# Patient Record
Sex: MALE | Race: WHITE | NOT HISPANIC OR LATINO | Employment: OTHER | URBAN - METROPOLITAN AREA
[De-identification: names, ages, dates, MRNs, and addresses within clinical notes are randomized per-mention and may not be internally consistent; named-entity substitution may affect disease eponyms.]

---

## 2017-02-27 ENCOUNTER — TRANSCRIBE ORDERS (OUTPATIENT)
Dept: LAB | Facility: OTHER | Age: 82
End: 2017-02-27

## 2017-02-27 ENCOUNTER — APPOINTMENT (OUTPATIENT)
Dept: LAB | Facility: OTHER | Age: 82
End: 2017-02-27
Payer: MEDICARE

## 2017-02-27 DIAGNOSIS — J90 UNSPECIFIED PLEURAL EFFUSION: ICD-10-CM

## 2017-02-27 DIAGNOSIS — I27.29 PULMONARY HYPERTENSIVE VENOUS DISEASE (HCC): ICD-10-CM

## 2017-02-27 DIAGNOSIS — N18.30 CHRONIC KIDNEY DISEASE, STAGE III (MODERATE) (HCC): ICD-10-CM

## 2017-02-27 DIAGNOSIS — M1A.00X0 CHRONIC GOUTY ARTHRITIS: ICD-10-CM

## 2017-02-27 DIAGNOSIS — I71.4 ABDOMINAL AORTIC ANEURYSM WITHOUT RUPTURE (HCC): ICD-10-CM

## 2017-02-27 DIAGNOSIS — I50.22 CHRONIC SYSTOLIC HEART FAILURE (HCC): Primary | ICD-10-CM

## 2017-02-27 DIAGNOSIS — I50.22 CHRONIC SYSTOLIC HEART FAILURE (HCC): ICD-10-CM

## 2017-02-27 LAB
ANION GAP SERPL CALCULATED.3IONS-SCNC: 5 MMOL/L (ref 4–13)
BASOPHILS # BLD MANUAL: 0 THOUSAND/UL (ref 0–0.1)
BASOPHILS NFR MAR MANUAL: 0 % (ref 0–1)
BUN SERPL-MCNC: 36 MG/DL (ref 5–25)
CALCIUM SERPL-MCNC: 9.3 MG/DL (ref 8.3–10.1)
CHLORIDE SERPL-SCNC: 107 MMOL/L (ref 100–108)
CO2 SERPL-SCNC: 29 MMOL/L (ref 21–32)
CREAT SERPL-MCNC: 1.87 MG/DL (ref 0.6–1.3)
EOSINOPHIL # BLD MANUAL: 0.29 THOUSAND/UL (ref 0–0.4)
EOSINOPHIL NFR BLD MANUAL: 4 % (ref 0–6)
ERYTHROCYTE [DISTWIDTH] IN BLOOD BY AUTOMATED COUNT: 14 % (ref 11.6–15.1)
GFR SERPL CREATININE-BSD FRML MDRD: 34.7 ML/MIN/1.73SQ M
GIANT PLATELETS BLD QL SMEAR: PRESENT
GLUCOSE SERPL-MCNC: 114 MG/DL (ref 65–140)
HCT VFR BLD AUTO: 44.8 % (ref 36.5–49.3)
HGB BLD-MCNC: 15 G/DL (ref 12–17)
LYMPHOCYTES # BLD AUTO: 0.94 THOUSAND/UL (ref 0.6–4.47)
LYMPHOCYTES # BLD AUTO: 13 % (ref 14–44)
MCH RBC QN AUTO: 32.4 PG (ref 26.8–34.3)
MCHC RBC AUTO-ENTMCNC: 33.5 G/DL (ref 31.4–37.4)
MCV RBC AUTO: 97 FL (ref 82–98)
MONOCYTES # BLD AUTO: 0.36 THOUSAND/UL (ref 0–1.22)
MONOCYTES NFR BLD: 5 % (ref 4–12)
NEUTROPHILS # BLD MANUAL: 5.58 THOUSAND/UL (ref 1.85–7.62)
NEUTS SEG NFR BLD AUTO: 77 % (ref 43–75)
NRBC BLD AUTO-RTO: 0 /100 WBCS
NT-PROBNP SERPL-MCNC: ABNORMAL PG/ML
PLATELET # BLD AUTO: 132 THOUSANDS/UL (ref 149–390)
PLATELET BLD QL SMEAR: ADEQUATE
PMV BLD AUTO: 13.1 FL (ref 8.9–12.7)
POTASSIUM SERPL-SCNC: 4.5 MMOL/L (ref 3.5–5.3)
RBC # BLD AUTO: 4.63 MILLION/UL (ref 3.88–5.62)
RBC MORPH BLD: NORMAL
SODIUM SERPL-SCNC: 141 MMOL/L (ref 136–145)
URATE SERPL-MCNC: 10 MG/DL (ref 4.2–8)
VARIANT LYMPHS # BLD AUTO: 1 %
WBC # BLD AUTO: 7.25 THOUSAND/UL (ref 4.31–10.16)

## 2017-02-27 PROCEDURE — 85007 BL SMEAR W/DIFF WBC COUNT: CPT

## 2017-02-27 PROCEDURE — 84550 ASSAY OF BLOOD/URIC ACID: CPT

## 2017-02-27 PROCEDURE — 85027 COMPLETE CBC AUTOMATED: CPT

## 2017-02-27 PROCEDURE — 36415 COLL VENOUS BLD VENIPUNCTURE: CPT

## 2017-02-27 PROCEDURE — 80048 BASIC METABOLIC PNL TOTAL CA: CPT

## 2017-02-27 PROCEDURE — 83880 ASSAY OF NATRIURETIC PEPTIDE: CPT

## 2018-01-11 NOTE — MISCELLANEOUS
Message  Discussed with patient MRI scan results, regarding the subacute to early chronic stroke, patient is feeling much better at the current time, denies having any headache or any weakness, his carotid ultrasound was normal, I spoke to his cardiologist Dr Jaclyn Kong, he was going to do a cardiac workup including cardiac monitoring and decide if patient needs to be on anticoagulation patient was advised to increase his baby aspirin to regular aspirin and to go to the hospital if has strokelike symptoms, office staff   O2 connect me to his family physician to give him an update patient was advised to keep blood pressure cholesterol and sugar under control and to take fall and safety precautions, he is not keen to be admitted into the hospital,,,      Signatures   Electronically signed by : Ariel Gonsalez MD; Sep  7 2016 12:37PM EST                       (Author)

## 2018-04-18 LAB
ANION GAP SERPL CALCULATED.3IONS-SCNC: 10.9 MM/L
APTT PPP: 29 SEC (ref 24.4–37.6)
BASOPHILS # BLD AUTO: 0.1 X3/UL (ref 0–0.3)
BASOPHILS # BLD AUTO: 1.4 % (ref 0–2)
BNP SERPL-MCNC: 1273 PG/ML (ref 1–100)
BUN SERPL-MCNC: 39 MG/DL (ref 7–25)
CALCIUM SERPL-MCNC: 9.6 MG/DL (ref 8.6–10.5)
CHLORIDE SERPL-SCNC: 100 MM/L (ref 98–107)
CO2 SERPL-SCNC: 36 MM/L (ref 21–31)
CREAT SERPL-MCNC: 1.69 MG/DL (ref 0.7–1.3)
DEPRECATED RDW RBC AUTO: 15.7 %
EGFR (HISTORICAL): 39 GFR
EGFR AFRICAN AMERICAN (HISTORICAL): 47 GFR
EOSINOPHIL # BLD AUTO: 0.2 X3/UL (ref 0–0.5)
EOSINOPHIL NFR BLD AUTO: 2.9 % (ref 0–5)
GLUCOSE (HISTORICAL): 107 MG/DL (ref 65–99)
HCT VFR BLD AUTO: 44.1 % (ref 42–52)
HGB BLD-MCNC: 14.8 G/DL (ref 14–18)
INFLUENZA A (VIRAL ID) (HISTORICAL): NEGATIVE
INFLUENZA B (VIRAL ID) (HISTORICAL): NEGATIVE
INR PPP: 1.23 (ref 0.9–1.5)
LYMPHOCYTES # BLD AUTO: 0.6 X3/UL (ref 1.2–4.2)
LYMPHOCYTES NFR BLD AUTO: 9.8 % (ref 20.5–51.1)
MAGNESIUM SERPL-MCNC: 2.2 MG/DL (ref 1.9–2.7)
MCH RBC QN AUTO: 33.4 PG (ref 26–34)
MCHC RBC AUTO-ENTMCNC: 33.6 G/DL (ref 31–37)
MCV RBC AUTO: 99.6 FL (ref 81–99)
MONOCYTES # BLD AUTO: 0.5 X3/UL (ref 0–1)
MONOCYTES NFR BLD AUTO: 8.1 % (ref 1.7–12)
NEUTROPHILS # BLD AUTO: 5 X3/UL (ref 1.4–6.5)
NEUTS SEG NFR BLD AUTO: 77.8 % (ref 42.2–75.2)
OSMOLALITY, SERUM (HISTORICAL): 295 MOSM (ref 262–291)
OVALOCYTOSIS (HISTORICAL): SLIGHT
PHOSPHATE SERPL-MCNC: 3.3 MG/DL (ref 3–5.5)
PLATELET # BLD AUTO: 131 X3/UL (ref 130–400)
PLATELET ESTIMATE (HISTORICAL): ABNORMAL
PMV BLD AUTO: 10.2 FL
POTASSIUM SERPL-SCNC: 3.9 MM/L (ref 3.5–5.5)
PROTHROMBIN TIME (HISTORICAL): 14.2 SEC (ref 10.1–12.9)
RBC # BLD AUTO: 4.43 X6/UL (ref 4.3–5.9)
SODIUM SERPL-SCNC: 143 MM/L (ref 134–143)
WBC # BLD AUTO: 6.4 X3/UL (ref 4.8–10.8)

## 2018-04-19 LAB
ALBUMIN SERPL BCP-MCNC: 3.7 G/DL (ref 3.5–5.7)
ALP SERPL-CCNC: 47 IU/L (ref 55–165)
ALT SERPL W P-5'-P-CCNC: 9 IU/L (ref 7–26)
ANION GAP SERPL CALCULATED.3IONS-SCNC: 12.9 MM/L
AST SERPL W P-5'-P-CCNC: 14 U/L (ref 8–27)
BASOPHILS # BLD AUTO: 0 X3/UL (ref 0–0.3)
BASOPHILS # BLD AUTO: 0.3 % (ref 0–2)
BILIRUB SERPL-MCNC: 1.1 MG/DL (ref 0.3–1)
BUN SERPL-MCNC: 38 MG/DL (ref 7–25)
CALCIUM SERPL-MCNC: 9.2 MG/DL (ref 8.6–10.5)
CHLORIDE SERPL-SCNC: 99 MM/L (ref 98–107)
CO2 SERPL-SCNC: 35 MM/L (ref 21–31)
CREAT SERPL-MCNC: 1.81 MG/DL (ref 0.7–1.3)
DEPRECATED RDW RBC AUTO: 15.8 %
EGFR (HISTORICAL): 36 GFR
EGFR AFRICAN AMERICAN (HISTORICAL): 44 GFR
EOSINOPHIL # BLD AUTO: 0 X3/UL (ref 0–0.5)
EOSINOPHIL NFR BLD AUTO: 0 % (ref 0–5)
GLUCOSE (HISTORICAL): 132 MG/DL (ref 65–99)
HCT VFR BLD AUTO: 39.3 % (ref 42–52)
HGB BLD-MCNC: 13.5 G/DL (ref 14–18)
INR PPP: 1.2 (ref 0.9–1.5)
LYMPHOCYTES # BLD AUTO: 0.2 X3/UL (ref 1.2–4.2)
LYMPHOCYTES NFR BLD AUTO: 5 % (ref 20.5–51.1)
LYMPHOCYTES NFR BLD AUTO: 7.3 % (ref 20.5–51.1)
MACROCYTOSIS (HISTORICAL): ABNORMAL
MCH RBC QN AUTO: 34.1 PG (ref 26–34)
MCHC RBC AUTO-ENTMCNC: 34.4 G/DL (ref 31–37)
MCV RBC AUTO: 99 FL (ref 81–99)
MONOCYTES # BLD AUTO: 0.1 X3/UL (ref 0–1)
MONOCYTES (HISTORICAL): 3 % (ref 1.7–12)
MONOCYTES NFR BLD AUTO: 2.1 % (ref 1.7–12)
NEUTROPHILS # BLD AUTO: 2.8 X3/UL (ref 1.4–6.5)
NEUTROPHILS ABS COUNT (HISTORICAL): 92 % (ref 42.2–75.2)
NEUTS SEG NFR BLD AUTO: 90.3 % (ref 42.2–75.2)
OSMOLALITY, SERUM (HISTORICAL): 296 MOSM (ref 262–291)
PLATELET # BLD AUTO: 134 X3/UL (ref 130–400)
PLATELET ESTIMATE (HISTORICAL): ABNORMAL
PMV BLD AUTO: 11 FL
POTASSIUM SERPL-SCNC: 3.9 MM/L (ref 3.5–5.5)
PROTHROMBIN TIME (HISTORICAL): 13.9 SEC (ref 10.1–12.9)
RBC # BLD AUTO: 3.97 X6/UL (ref 4.3–5.9)
SODIUM SERPL-SCNC: 143 MM/L (ref 134–143)
TOTAL PROTEIN (HISTORICAL): 6.7 G/DL (ref 6.4–8.9)
TROPONIN I SERPL-MCNC: < 0.03 NG/ML
WBC # BLD AUTO: 3.1 X3/UL (ref 4.8–10.8)

## 2018-04-20 LAB
ALBUMIN SERPL BCP-MCNC: 3.5 G/DL (ref 3.5–5.7)
ALP SERPL-CCNC: 47 IU/L (ref 55–165)
ALT SERPL W P-5'-P-CCNC: 8 IU/L (ref 7–26)
ANION GAP SERPL CALCULATED.3IONS-SCNC: 11.8 MM/L
AST SERPL W P-5'-P-CCNC: 14 U/L (ref 8–27)
BASOPHILS # BLD AUTO: 0 X3/UL (ref 0–0.3)
BASOPHILS # BLD AUTO: 0.3 % (ref 0–2)
BILIRUB SERPL-MCNC: 1.1 MG/DL (ref 0.3–1)
BUN SERPL-MCNC: 47 MG/DL (ref 7–25)
CALCIUM SERPL-MCNC: 9.2 MG/DL (ref 8.6–10.5)
CHLORIDE SERPL-SCNC: 95 MM/L (ref 98–107)
CO2 SERPL-SCNC: 36 MM/L (ref 21–31)
CREAT SERPL-MCNC: 2.11 MG/DL (ref 0.7–1.3)
DEPRECATED RDW RBC AUTO: 15.5 %
EGFR (HISTORICAL): 30 GFR
EGFR AFRICAN AMERICAN (HISTORICAL): 36 GFR
EOSINOPHIL # BLD AUTO: 0.1 X3/UL (ref 0–0.5)
EOSINOPHIL NFR BLD AUTO: 0.8 % (ref 0–5)
GLUCOSE (HISTORICAL): 98 MG/DL (ref 65–99)
HCT VFR BLD AUTO: 42.1 % (ref 42–52)
HGB BLD-MCNC: 14.4 G/DL (ref 14–18)
LYMPHOCYTES # BLD AUTO: 0.5 X3/UL (ref 1.2–4.2)
LYMPHOCYTES NFR BLD AUTO: 4.2 % (ref 20.5–51.1)
MAGNESIUM SERPL-MCNC: 2.1 MG/DL (ref 1.9–2.7)
MCH RBC QN AUTO: 33.9 PG (ref 26–34)
MCHC RBC AUTO-ENTMCNC: 34.2 G/DL (ref 31–37)
MCV RBC AUTO: 99.2 FL (ref 81–99)
MONOCYTES # BLD AUTO: 0.6 X3/UL (ref 0–1)
MONOCYTES NFR BLD AUTO: 5.8 % (ref 1.7–12)
NEUTROPHILS # BLD AUTO: 9.6 X3/UL (ref 1.4–6.5)
NEUTS SEG NFR BLD AUTO: 88.9 % (ref 42.2–75.2)
OSMOLALITY, SERUM (HISTORICAL): 290 MOSM (ref 262–291)
PHOSPHATE SERPL-MCNC: 3.9 MG/DL (ref 3–5.5)
PLATELET # BLD AUTO: 123 X3/UL (ref 130–400)
PMV BLD AUTO: 10.9 FL
POTASSIUM SERPL-SCNC: 3.8 MM/L (ref 3.5–5.5)
RBC # BLD AUTO: 4.24 X6/UL (ref 4.3–5.9)
SODIUM SERPL-SCNC: 139 MM/L (ref 134–143)
TOTAL PROTEIN (HISTORICAL): 6.6 G/DL (ref 6.4–8.9)
WBC # BLD AUTO: 10.8 X3/UL (ref 4.8–10.8)

## 2018-04-21 LAB
ACCESSION NUMBER (HISTORICAL): 108.01
COLLECTION DATE (HISTORICAL): NORMAL
CULTURE STATUS (HISTORICAL): NORMAL
MRSA SCREEN (HISTORICAL): POSITIVE
SPECIMEN SOURCE (HISTORICAL): NORMAL
SPECIMEN TYPE RECEIVED: (HISTORICAL): NORMAL
TEST INFORMATION (HISTORICAL): NORMAL

## 2018-06-07 LAB
ALBUMIN SERPL BCP-MCNC: 4 G/DL (ref 3.5–5.7)
ALP SERPL-CCNC: 63 IU/L (ref 55–165)
ALT SERPL W P-5'-P-CCNC: 8 IU/L (ref 7–26)
ANION GAP SERPL CALCULATED.3IONS-SCNC: 17.7 MM/L
AST SERPL W P-5'-P-CCNC: 15 U/L (ref 8–27)
BASOPHILS # BLD AUTO: 0.1 X3/UL (ref 0–0.3)
BASOPHILS # BLD AUTO: 1.5 % (ref 0–2)
BILIRUB SERPL-MCNC: 1.4 MG/DL (ref 0.3–1)
BNP SERPL-MCNC: 2036 PG/ML (ref 1–100)
BUN SERPL-MCNC: 38 MG/DL (ref 7–25)
CALCIUM SERPL-MCNC: 9.5 MG/DL (ref 8.6–10.5)
CHLORIDE SERPL-SCNC: 96 MM/L (ref 98–107)
CO2 SERPL-SCNC: 32 MM/L (ref 21–31)
CREAT SERPL-MCNC: 2.1 MG/DL (ref 0.7–1.3)
DEPRECATED RDW RBC AUTO: 14.9 % (ref 11.5–14.5)
EGFR (HISTORICAL): 30 GFR
EGFR AFRICAN AMERICAN (HISTORICAL): 37 GFR
EOSINOPHIL # BLD AUTO: 0.2 X3/UL (ref 0–0.5)
EOSINOPHIL NFR BLD AUTO: 2.6 % (ref 0–5)
GLUCOSE (HISTORICAL): 91 MG/DL (ref 65–99)
HCT VFR BLD AUTO: 41.6 % (ref 42–52)
HGB BLD-MCNC: 14.1 G/DL (ref 14–18)
LYMPHOCYTES # BLD AUTO: 0.8 X3/UL (ref 1.2–4.2)
LYMPHOCYTES NFR BLD AUTO: 11.3 % (ref 20.5–51.1)
MCH RBC QN AUTO: 33.6 PG (ref 26–34)
MCHC RBC AUTO-ENTMCNC: 34 G/DL (ref 31–36)
MCV RBC AUTO: 98.9 FL (ref 81–99)
MONOCYTES # BLD AUTO: 0.6 X3/UL (ref 0–1)
MONOCYTES NFR BLD AUTO: 8.2 % (ref 1.7–12)
NEUTROPHILS # BLD AUTO: 5.5 X3/UL (ref 1.4–6.5)
NEUTS SEG NFR BLD AUTO: 76.4 % (ref 42.2–75.2)
OSMOLALITY, SERUM (HISTORICAL): 292 MOSM (ref 262–291)
PLATELET # BLD AUTO: 154 X3/UL (ref 130–400)
PMV BLD AUTO: 11.2 FL (ref 8.6–11.7)
POTASSIUM SERPL-SCNC: 3.7 MM/L (ref 3.5–5.5)
RBC # BLD AUTO: 4.2 X6/UL (ref 4.3–5.9)
SODIUM SERPL-SCNC: 142 MM/L (ref 134–143)
TOTAL PROTEIN (HISTORICAL): 7.7 G/DL (ref 6.4–8.9)
WBC # BLD AUTO: 7.3 X3/UL (ref 4.8–10.8)

## 2018-06-22 ENCOUNTER — HOSPITAL ENCOUNTER (OUTPATIENT)
Dept: RADIOLOGY | Facility: HOSPITAL | Age: 83
Discharge: HOME | End: 2018-06-22
Payer: MEDICARE

## 2018-06-22 DIAGNOSIS — R53.1 WEAKNESS: ICD-10-CM

## 2018-06-22 LAB
ALBUMIN SERPL BCP-MCNC: 3.7 G/DL (ref 3.5–5.7)
ALP SERPL-CCNC: 52 IU/L (ref 55–165)
ALT SERPL W P-5'-P-CCNC: 11 IU/L (ref 7–26)
ANION GAP SERPL CALCULATED.3IONS-SCNC: 17.3 MM/L
APTT PPP: 28.8 SEC (ref 24.4–37.6)
AST SERPL W P-5'-P-CCNC: 24 U/L (ref 8–27)
BASOPHILS # BLD AUTO: 0.1 X3/UL (ref 0–0.3)
BASOPHILS # BLD AUTO: 1.2 % (ref 0–2)
BILIRUB SERPL-MCNC: 2 MG/DL (ref 0.3–1)
BNP SERPL-MCNC: 2783 PG/ML (ref 1–100)
BUN SERPL-MCNC: 53 MG/DL (ref 7–25)
CALCIUM SERPL-MCNC: 9.8 MG/DL (ref 8.6–10.5)
CHLORIDE SERPL-SCNC: 102 MM/L (ref 98–107)
CK SERPL-CCNC: 79 IU/L (ref 30–223)
CO2 SERPL-SCNC: 26 MM/L (ref 21–31)
CREAT SERPL-MCNC: 2.34 MG/DL (ref 0.7–1.3)
DEPRECATED RDW RBC AUTO: 16 %
EGFR (HISTORICAL): 27 GFR
EGFR AFRICAN AMERICAN (HISTORICAL): 32 GFR
EOSINOPHIL # BLD AUTO: 0.1 X3/UL (ref 0–0.5)
EOSINOPHIL NFR BLD AUTO: 1.4 % (ref 0–5)
GLUCOSE (HISTORICAL): 100 MG/DL (ref 65–99)
HCT VFR BLD AUTO: 46.2 % (ref 42–52)
HGB BLD-MCNC: 15.4 G/DL (ref 14–18)
INR PPP: 1.31 (ref 0.9–1.5)
LYMPHOCYTES # BLD AUTO: 0.8 X3/UL (ref 1.2–4.2)
LYMPHOCYTES NFR BLD AUTO: 9.7 % (ref 20.5–51.1)
MACROCYTOSIS (HISTORICAL): SLIGHT
MCH RBC QN AUTO: 33.8 PG (ref 26–34)
MCHC RBC AUTO-ENTMCNC: 33.4 G/DL (ref 31–37)
MCV RBC AUTO: 101.3 FL (ref 81–99)
MONOCYTES # BLD AUTO: 0.7 X3/UL (ref 0–1)
MONOCYTES NFR BLD AUTO: 8.9 % (ref 1.7–12)
NEUTROPHILS # BLD AUTO: 6.4 X3/UL (ref 1.4–6.5)
NEUTS SEG NFR BLD AUTO: 78.8 % (ref 42.2–75.2)
OSMOLALITY, SERUM (HISTORICAL): 296 MOSM (ref 262–291)
OVALOCYTOSIS (HISTORICAL): SLIGHT
PLATELET # BLD AUTO: 124 X3/UL (ref 130–400)
PLATELET ESTIMATE (HISTORICAL): ABNORMAL
PMV BLD AUTO: 11.6 FL
POTASSIUM SERPL-SCNC: 4.3 MM/L (ref 3.5–5.5)
PROTHROMBIN TIME (HISTORICAL): 15.2 SEC (ref 10.1–12.9)
RBC # BLD AUTO: 4.56 X6/UL (ref 4.3–5.9)
SODIUM SERPL-SCNC: 141 MM/L (ref 134–143)
TOTAL PROTEIN (HISTORICAL): 7.2 G/DL (ref 6.4–8.9)
TROPONIN I SERPL-MCNC: 0.03 NG/ML
WBC # BLD AUTO: 8.1 X3/UL (ref 4.8–10.8)

## 2018-06-23 ENCOUNTER — HOSPITAL ENCOUNTER (INPATIENT)
Facility: HOSPITAL | Age: 83
LOS: 2 days | DRG: 291 | End: 2018-06-25
Attending: INTERNAL MEDICINE | Admitting: PHYSICAL MEDICINE & REHABILITATION
Payer: MEDICARE

## 2018-06-23 DIAGNOSIS — R53.81 DEBILITY: ICD-10-CM

## 2018-06-23 DIAGNOSIS — N18.9 ACUTE KIDNEY INJURY SUPERIMPOSED ON CHRONIC KIDNEY DISEASE (HCC): ICD-10-CM

## 2018-06-23 DIAGNOSIS — I50.9 CONGESTIVE HEART FAILURE (HCC): ICD-10-CM

## 2018-06-23 DIAGNOSIS — R63.0 ANOREXIA: Primary | ICD-10-CM

## 2018-06-23 DIAGNOSIS — N17.9 ACUTE KIDNEY INJURY SUPERIMPOSED ON CHRONIC KIDNEY DISEASE (HCC): ICD-10-CM

## 2018-06-23 PROBLEM — I48.91 ATRIAL FIBRILLATION (HCC): Status: ACTIVE | Noted: 2018-06-23

## 2018-06-23 PROBLEM — N28.9 ACUTE ON CHRONIC RENAL INSUFFICIENCY: Chronic | Status: ACTIVE | Noted: 2018-06-23

## 2018-06-23 PROBLEM — I25.10 CAD (CORONARY ARTERY DISEASE): Status: ACTIVE | Noted: 2018-06-23

## 2018-06-23 PROBLEM — I27.20 PULMONARY HTN (HCC): Status: ACTIVE | Noted: 2018-06-23

## 2018-06-23 PROBLEM — I50.23 ACUTE ON CHRONIC SYSTOLIC CHF (CONGESTIVE HEART FAILURE) (HCC): Status: ACTIVE | Noted: 2018-06-23

## 2018-06-23 PROBLEM — N18.30 HYPERTENSIVE HEART AND KIDNEY DISEASE WITH HF AND WITH CKD STAGE III (HCC): Status: ACTIVE | Noted: 2018-06-23

## 2018-06-23 PROBLEM — N18.30 CKD (CHRONIC KIDNEY DISEASE), STAGE III (HCC): Status: ACTIVE | Noted: 2018-06-23

## 2018-06-23 PROBLEM — I71.4 AAA (ABDOMINAL AORTIC ANEURYSM) (HCC): Status: ACTIVE | Noted: 2018-06-23

## 2018-06-23 PROBLEM — I13.0 HYPERTENSIVE HEART AND KIDNEY DISEASE WITH HF AND WITH CKD STAGE III (HCC): Status: ACTIVE | Noted: 2018-06-23

## 2018-06-23 PROBLEM — G45.9 TIA (TRANSIENT ISCHEMIC ATTACK): Status: ACTIVE | Noted: 2018-06-23

## 2018-06-23 PROBLEM — E46 MALNUTRITION (HCC): Status: ACTIVE | Noted: 2018-06-23

## 2018-06-23 PROBLEM — J44.9 COPD (CHRONIC OBSTRUCTIVE PULMONARY DISEASE) (HCC): Status: ACTIVE | Noted: 2018-06-23

## 2018-06-23 PROBLEM — Z95.810 ICD (IMPLANTABLE CARDIOVERTER-DEFIBRILLATOR) IN PLACE: Chronic | Status: ACTIVE | Noted: 2018-06-23

## 2018-06-23 PROBLEM — I12.9 HYPERTENSIVE NEPHROSCLEROSIS: Status: ACTIVE | Noted: 2018-06-23

## 2018-06-23 PROBLEM — I42.0 DILATED CARDIOMYOPATHY (HCC): Chronic | Status: ACTIVE | Noted: 2018-06-23

## 2018-06-23 LAB
BILIRUB UR QL STRIP: ABNORMAL
CALCIUM OXALATE (HISTORICAL): ABNORMAL
CLARITY UR: CLEAR
COLOR UR: YELLOW
GLUCOSE UR STRIP-MCNC: NEGATIVE MG/DL
HGB UR QL STRIP.AUTO: ABNORMAL
KETONES UR STRIP-MCNC: NEGATIVE MG/DL
LEUKOCYTE ESTERASE UR QL STRIP: NEGATIVE
NITRITE UR QL STRIP: NEGATIVE
PH UR STRIP.AUTO: 5.5 [PH] (ref 4.5–8)
PROT UR STRIP-MCNC: ABNORMAL MG/DL
RBC #/AREA URNS AUTO: ABNORMAL /HPF
SP GR UR STRIP.AUTO: 1.02 (ref 1–1.03)
SPERM CELLS (HISTORICAL): POSITIVE
UROBILINOGEN UR QL STRIP.AUTO: 0.2 EU/DL (ref 0.2–8)
WBC #/AREA URNS AUTO: ABNORMAL /HPF

## 2018-06-23 PROCEDURE — 99221 1ST HOSP IP/OBS SF/LOW 40: CPT | Performed by: INTERNAL MEDICINE

## 2018-06-23 PROCEDURE — 93005 ELECTROCARDIOGRAM TRACING: CPT

## 2018-06-23 RX ORDER — COLCHICINE 0.6 MG/1
0.6 TABLET ORAL AS NEEDED
COMMUNITY

## 2018-06-23 RX ORDER — FUROSEMIDE 10 MG/ML
40 INJECTION INTRAMUSCULAR; INTRAVENOUS
Status: DISPENSED | OUTPATIENT
Start: 2018-06-23 | End: 2018-06-24

## 2018-06-23 RX ORDER — CARVEDILOL 3.12 MG/1
3.12 TABLET ORAL 2 TIMES DAILY WITH MEALS
Status: DISCONTINUED | OUTPATIENT
Start: 2018-06-23 | End: 2018-06-25 | Stop reason: HOSPADM

## 2018-06-23 RX ORDER — MECLIZINE HCL 12.5 MG/1
12.5 TABLET ORAL 3 TIMES DAILY PRN
Status: DISCONTINUED | OUTPATIENT
Start: 2018-06-23 | End: 2018-06-25 | Stop reason: HOSPADM

## 2018-06-23 RX ORDER — ACETAMINOPHEN 325 MG/1
650 TABLET ORAL 4 TIMES DAILY PRN
Status: DISCONTINUED | OUTPATIENT
Start: 2018-06-23 | End: 2018-06-25 | Stop reason: HOSPADM

## 2018-06-23 RX ORDER — ISOSORBIDE DINITRATE 20 MG/1
20 TABLET ORAL
COMMUNITY

## 2018-06-23 RX ORDER — FUROSEMIDE 40 MG/1
20 TABLET ORAL 2 TIMES DAILY
COMMUNITY
End: 2018-07-05 | Stop reason: HOSPADM

## 2018-06-23 RX ORDER — ASPIRIN 81 MG/1
81 TABLET, CHEWABLE ORAL DAILY
Status: DISCONTINUED | OUTPATIENT
Start: 2018-06-23 | End: 2018-06-25 | Stop reason: HOSPADM

## 2018-06-23 RX ORDER — ALBUTEROL SULFATE 2.5 MG/3ML
2.5 SOLUTION RESPIRATORY (INHALATION) 4 TIMES DAILY PRN
COMMUNITY
End: 2018-07-05 | Stop reason: HOSPADM

## 2018-06-23 RX ORDER — ONDANSETRON 2 MG/ML
4 INJECTION INTRAMUSCULAR; INTRAVENOUS EVERY 4 HOURS PRN
Status: DISCONTINUED | OUTPATIENT
Start: 2018-06-23 | End: 2018-06-25 | Stop reason: HOSPADM

## 2018-06-23 RX ORDER — HEPARIN SODIUM 5000 [USP'U]/ML
5000 INJECTION, SOLUTION INTRAVENOUS; SUBCUTANEOUS EVERY 12 HOURS SCHEDULED
Status: DISCONTINUED | OUTPATIENT
Start: 2018-06-23 | End: 2018-06-25 | Stop reason: HOSPADM

## 2018-06-23 RX ORDER — MECLIZINE HYDROCHLORIDE 25 MG/1
25 TABLET ORAL 3 TIMES DAILY
COMMUNITY

## 2018-06-23 RX ORDER — FUROSEMIDE 20 MG/1
20 TABLET ORAL 3 TIMES WEEKLY
COMMUNITY
End: 2018-07-05 | Stop reason: HOSPADM

## 2018-06-23 RX ORDER — ISOSORBIDE DINITRATE 10 MG/1
20 TABLET ORAL DAILY
Status: DISCONTINUED | OUTPATIENT
Start: 2018-06-23 | End: 2018-06-25 | Stop reason: HOSPADM

## 2018-06-23 RX ORDER — ONDANSETRON 2 MG/ML
4 INJECTION INTRAMUSCULAR; INTRAVENOUS ONCE
Status: DISCONTINUED | OUTPATIENT
Start: 2018-06-23 | End: 2018-06-23

## 2018-06-23 RX ORDER — HYDRALAZINE HYDROCHLORIDE 25 MG/1
25 TABLET, FILM COATED ORAL 2 TIMES DAILY
Status: DISCONTINUED | OUTPATIENT
Start: 2018-06-23 | End: 2018-06-25 | Stop reason: HOSPADM

## 2018-06-23 RX ORDER — CARVEDILOL 3.12 MG/1
3.12 TABLET ORAL 2 TIMES DAILY WITH MEALS
COMMUNITY

## 2018-06-23 RX ORDER — MEGESTROL ACETATE 40 MG/1
20 TABLET ORAL 4 TIMES DAILY
Status: DISCONTINUED | OUTPATIENT
Start: 2018-06-23 | End: 2018-06-25 | Stop reason: HOSPADM

## 2018-06-23 RX ADMIN — CARVEDILOL 3.12 MG: 3.12 TABLET, FILM COATED ORAL at 09:30

## 2018-06-23 RX ADMIN — HEPARIN SODIUM 5000 UNITS: 5000 INJECTION INTRAVENOUS; SUBCUTANEOUS at 20:03

## 2018-06-23 RX ADMIN — ASPIRIN 81 MG 81 MG: 81 TABLET ORAL at 09:30

## 2018-06-23 RX ADMIN — MEGESTROL ACETATE 20 MG: 40 TABLET ORAL at 19:00

## 2018-06-23 RX ADMIN — HEPARIN SODIUM 5000 UNITS: 5000 INJECTION INTRAVENOUS; SUBCUTANEOUS at 09:31

## 2018-06-23 RX ADMIN — ISOSORBIDE DINITRATE 20 MG: 10 TABLET ORAL at 09:32

## 2018-06-23 NOTE — ASSESSMENT & PLAN NOTE
Mild KASANDRA at this time likely due to acute CHF  I agree with aggressive diuresis  Patient's labs will need to be closely followed  Will check PVR and urine studies to ensure there are no anatomic or other clinical issues of note

## 2018-06-23 NOTE — PROGRESS NOTES
Progress Note - Jass Cantu 1934, 80 y o  male MRN: [id number]    Unit/Bed#: -01 Encounter: [id number]    Primary Care Provider: Nikki Contreras MD   Date and time admitted to hospital: 6/23/2018  3:57 AM        * Acute on chronic systolic CHF (congestive heart failure) ([facility] )   Assessment & Plan    · His BNP is high but does not appear to be fluid overload  I will continue with diuresis at this time  · F/u renal function in AM    · Cardiology input is appreciated  · Pending echo on Monday         Acute kidney injury superimposed on chronic kidney disease ([facility] )   Assessment & Plan    · Suspected KASANDRA is due to acute CHF  His baseline Cr is approx  1 7-1 8  Renal input is appreciated  · Will continue with diuretic at this time  · Check PVR per renal, urine studies  · F/u with renal function in AM         Malnutrition [facility])   Assessment & Plan    Malnutrition Findings:           BMI Findings: Body mass index is 18 11 kg/m²  · Dietary consult  · Added megace  · Will add nutritional supplement        CAD (coronary artery disease)   Assessment & Plan    · Continue with current home regimen        COPD (chronic obstructive pulmonary disease) ([facility])   Assessment & Plan    · Continue PRN neb for SOB          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 ([place] [address] )   Assessment & Plan    · Continue with furosemide, carvedilol, hydralazine, and isosorbide        Dilated cardiomyopathy ([place] [address] )   Assessment & Plan    · Status post AICD  Continue with current regimen  · Cardiology is following            VTE Pharmacologic Prophylaxis:   Pharmacologic: Heparin  Mechanical VTE Prophylaxis in Place: Yes    Patient Centered Rounds: I have performed bedside rounds with nursing staff today  Discussions with Specialists or Other Care Team Provider: Renal and cardiology     Education and Discussions with Family / Patient: patient     Time Spent for Care: 20 minutes    More than 50% of total time spent on counseling and coordination of care as described above  Current Length of Stay: 0 day(s)    Current Patient Status: Inpatient   Certification Statement: The patient will continue to require additional inpatient hospital stay due to aggresive diuretic and close labs monitoring     Discharge Plan: Likely Monday after echo pending hospital course  Code Status: Level 1 - Full Code      Subjective:   States that he is feeling weak but denies any pain  Objective:     Vitals:   Temp (24hrs), Av 1 °F (36 2 °C), Min:96 4 °F (35 8 °C), Max:97 5 °F (36 4 °C)    HR:  [77-81] 80  Resp:  [18-20] 18  BP: ()/(60-75) 102/66  SpO2:  [92 %-93 %] 92 %  Body mass index is 18 11 kg/m²  Input and Output Summary (last 24 hours): Intake/Output Summary (Last 24 hours) at 18 1614  Last data filed at 18 1422   Gross per 24 hour   Intake              450 ml   Output                0 ml   Net              450 ml       Physical Exam:     Physical Exam   Constitutional: He is oriented to person, place, and time  He appears well-developed  He appears cachectic  No distress  HENT:   Head: Normocephalic and atraumatic  Mouth/Throat: Oropharynx is clear and moist    Eyes: Conjunctivae and EOM are normal  Pupils are equal, round, and reactive to light  Neck: Normal range of motion  Neck supple  No thyromegaly present  Cardiovascular: Normal rate, regular rhythm and intact distal pulses  Murmur heard  Pulmonary/Chest: Effort normal  No respiratory distress  He has no wheezes  Decreased breathsounds in bases     Abdominal: Soft  Bowel sounds are normal  He exhibits no distension  There is no tenderness  Musculoskeletal: Normal range of motion  He exhibits no edema or deformity  Neurological: He is alert and oriented to person, place, and time  He has normal reflexes  Skin: Skin is warm and dry  No erythema  Psychiatric: He has a normal mood and affect   His behavior is normal  Thought content normal          Additional Data:     Labs: Invalid input(s): LABALBU                  * I Have Reviewed All Lab Data Listed Above  * Additional Pertinent Lab Tests Reviewed: [place] [facility] Admission Reviewed    Imaging:    Imaging Reports Reviewed Today Include: CXR shows bibasilar airspace opacities with small effusion and mild pulmonary vascular congestion  Recent Cultures (last 7 days):           Last 24 Hours Medication List:     Current Facility-Administered Medications:  acetaminophen 650 mg Oral 4x Daily PRN JESUS Sanchez   aspirin 81 mg Oral Daily Doris Fulton MD   carvedilol 3 125 mg Oral BID With Meals Doris Fulton MD   furosemide 40 mg Intravenous BID (diuretic) Doris Fulton MD   heparin (porcine) 5,000 Units Subcutaneous Q12H 300 Aldo Yang MD   hydrALAZINE 25 mg Oral BID Doris Fulton MD   isosorbide dinitrate 20 mg Oral Daily Doris Fulton MD   meclizine 12 5 mg Oral TID PRN Doris Fulton MD   megestrol 20 mg Oral 4x Daily JESUS Sanchez   ondansetron 4 mg Intravenous Q4H PRN JESUS Sanchez        Today, Patient Was Seen By: JESUS Sanchez    ** Please Note: Dictation voice to text software may have been used in the creation of this document   **

## 2018-06-23 NOTE — ASSESSMENT & PLAN NOTE
Dr Santiago Banuelos wished to defer ACEI at this time, which is appropriate given current Cr  Continue to monitor and treat BP within appropriate goals given current clinical issues

## 2018-06-23 NOTE — ASSESSMENT & PLAN NOTE
Check pre-albumin, encourage PO intake  This is complicated with a near complete lack of appetite at this time  Malnutrition Findings:    patient has temporal wasting       BMI Findings: Body mass index is 18 11 kg/m²

## 2018-06-23 NOTE — PLAN OF CARE
Problem: INFECTION - ADULT  Goal: Absence or prevention of progression during hospitalization  INTERVENTIONS:  - Assess and monitor for signs and symptoms of infection  - Monitor lab/diagnostic results  - Monitor all insertion sites, i e  indwelling lines, tubes, and drains  - Monitor endotracheal (as able) and nasal secretions for changes in amount and color  - [place] appropriate cooling/warming therapies per order  - Administer medications as ordered  - Instruct and encourage patient and family to use good hand hygiene technique  - Identify and instruct in appropriate isolation precautions for identified infection/condition   Outcome: Progressing      Problem: SAFETY ADULT  Goal: Patient will remain free of falls  INTERVENTIONS:  - Assess patient frequently for physical needs  -  Identify cognitive and physical deficits and behaviors that affect risk of falls    -  [place] fall precautions as indicated by assessment   - Educate patient/family on patient safety including physical limitations  - Instruct patient to call for assistance with activity based on assessment  - Modify environment to reduce risk of injury  - Consider OT/PT consult to assist with strengthening/mobility   Outcome: Progressing  Pt has remained free from falls and injury  Goal: Maintain or return to baseline ADL function  INTERVENTIONS:  -  Assess patient's ability to carry out ADLs; assess patient's baseline for ADL function and identify physical deficits which impact ability to perform ADLs (bathing, care of mouth/teeth, toileting, grooming, dressing, etc )  - Assess/evaluate cause of self-care deficits   - Assess range of motion  - Assess patient's mobility; develop plan if impaired  - Assess patient's need for assistive devices and provide as appropriate  - Encourage maximum independence but intervene and supervise when necessary  ¯ Involve family in performance of ADLs  ¯ Assess for home care needs following discharge   ¯ Request OT consult to assist with ADL evaluation and planning for discharge  ¯ Provide patient education as appropriate   Outcome: Progressing    Goal: Maintain or return mobility status to optimal level  INTERVENTIONS:  - Assess patient's baseline mobility status (ambulation, transfers, stairs, etc )    - Identify cognitive and physical deficits and behaviors that affect mobility  - Identify mobility aids required to assist with transfers and/or ambulation (gait belt, sit-to-stand, lift, walker, cane, etc )  - [place] fall precautions as indicated by assessment  - Record patient progress and toleration of activity level on Mobility SBAR; progress patient to next Phase/Stage  - Instruct patient to call for assistance with activity based on assessment  - Request Rehabilitation consult to assist with strengthening/weightbearing, etc    Outcome: Progressing      Problem: DISCHARGE PLANNING  Goal: Discharge to home or other facility with appropriate resources  INTERVENTIONS:  - Identify barriers to discharge w/patient and caregiver  - Arrange for needed discharge resources and transportation as appropriate  - Identify discharge learning needs (meds, wound care, etc )  - Arrange for interpretive services to assist at discharge as needed  - Refer to Case Management Department for coordinating discharge planning if the patient needs post-hospital services based on physician/advanced practitioner order or complex needs related to functional status, cognitive ability, or social support system   Outcome: Not Progressing      Problem: Knowledge Deficit  Goal: Patient/family/caregiver demonstrates understanding of disease process, treatment plan, medications, and discharge instructions  Complete learning assessment and assess knowledge base    Interventions:  - Provide teaching at level of understanding  - Provide teaching via preferred learning methods   Outcome: Progressing      Problem: Nutrition/Hydration-ADULT  Goal: Nutrient/Hydration intake appropriate for improving, restoring or maintaining nutritional needs  Monitor and assess patient's nutrition/hydration status for malnutrition (ex- brittle hair, bruises, dry skin, pale skin and conjunctiva, muscle wasting, smooth red tongue, and disorientation)  Collaborate with interdisciplinary team and initiate plan and interventions as ordered  Monitor patient's weight and dietary intake as ordered or per policy  Utilize nutrition screening tool and intervene per policy  Determine patient's food preferences and provide high-protein, high-caloric foods as appropriate       INTERVENTIONS:  - Monitor oral intake, urinary output, labs, and treatment plans  - Assess nutrition and hydration status and recommend course of action  - Evaluate amount of meals eaten  - Assist patient with eating if necessary   - Allow adequate time for meals  - Recommend/ encourage appropriate diets, oral nutritional supplements, and vitamin/mineral supplements  - Order, calculate, and assess calorie counts as needed  - Recommend, monitor, and adjust tube feedings and TPN/PPN based on assessed needs  - Assess need for intravenous fluids  - Provide specific nutrition/hydration education as appropriate  - Include patient/family/caregiver in decisions related to nutrition   Outcome: Progressing

## 2018-06-23 NOTE — CONSULTS
Consult- Frances Reason 1934, 80 y o  male MRN: [id number]    Unit/Bed#: -01 Encounter: [id number]    Primary Care Provider: Gumaro Alcantara MD   Date and time admitted to hospital: 6/23/2018  3:57 AM      Inpatient consult to Nephrology  Consult performed by: Gopal Mac ordered by: Raquel Lab          Acute kidney injury superimposed on chronic kidney disease ([facility] )   Assessment & Plan      Mild KASANDRA at this time likely due to acute CHF  I agree with aggressive diuresis  Patient's labs will need to be closely followed  Will check PVR and urine studies to ensure there are no anatomic or other clinical issues of note  CKD (chronic kidney disease), stage III   Assessment & Plan    With a baseline Cr ~1 7 - 1 8 mg/dL        Hypertensive nephrosclerosis   Assessment & Plan    Continue to monitor BPs  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 [facility])   Luis M Robledo wished to defer ACEI at this time, which is appropriate given current Cr  Continue to monitor and treat BP within appropriate goals given current clinical issues  * Congestive heart disease ([facility] )   Assessment & Plan    Continue with lasix 40 mg IV BID at this time  Monitor Cr and electrolytes closely  Malnutrition ([place] [address] )   Assessment & Plan      Check pre-albumin, encourage PO intake  This is complicated with a near complete lack of appetite at this time  Malnutrition Findings:    patient has temporal wasting       BMI Findings: Body mass index is 18 11 kg/m²  Anorexia   Assessment & Plan    Consider adding an appetite stimulant  Thank you for allowing us to participate in the care of your patient  Please feel free to contact us regarding the care of this patient, or any other questions/concerns that may be applicable      Patient Active Problem List   Diagnosis    Dilated cardiomyopathy ([facility] )    ICD (implantable cardioverter-defibrillator) in place    Acute on chronic renal insufficiency    Congestive heart disease (HCC)    Acute kidney injury superimposed on chronic kidney disease ([facility] )    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 ([facility] )    CKD (chronic kidney disease), stage III    Atrial fibrillation (HCC)    COPD (chronic obstructive pulmonary disease) (HCC)    TIA (transient ischemic attack)    Pulmonary HTN ([facility] )    AAA (abdominal aortic aneurysm) ([facility] )    CAD (coronary artery disease)       History of Present Illness   Physician Requesting Consult: Fady Mahoney MD  Reason for Consult / Principal Problem: KASANDRA/CKD  Hx and PE limited by:   HPI: Neptali Youssef is a 80y o  year old male who presented to the ED with complaints of weakness that was also associated with SOB that has been worsening over the last few days  Patient recently stopped his lasix, which was also recently adjusted by Dr Terence Jacobo wanted the lasix to decrease from 80 mg daily to 80 mg alt with 40 mg  Patient instead stopped the lasix altogether  Patient has been abiding with a low sodium diet  At about that time, the patient's above symptoms began  However, the patient also offers the peculiar history of continuing to loose weight, which he has been monitoring and claims to have been the reason he stopped taking the lasix  Despite this, patient is currently on IV lasix and is feeling much better  From the renal standpoint, the patient does not see a nephrologist, but he is aware that his renal function is decreased, though not to what extent  It appears that the patient has a baseline Cr of ~1 7 - 1 8 mg/dL for the past few years  He does not appear to have significant issues with his electrolytes  Presenting Cr was ~2 1 mg/dL      History obtained from chart review and the patient    Review of Systems - Negative except as mentioned above in HPI, more specifics as mentioned below   Review of Systems - General ROS: positive for  - fatigue  Psychological ROS: negative  Ophthalmic ROS: negative  ENT ROS: negative  Allergy and Immunology ROS: negative  Hematological and Lymphatic ROS: negative  Endocrine ROS: positive for - unexpected weight changes  Respiratory ROS: positive for - shortness of breath  Cardiovascular ROS: positive for - dyspnea on exertion and orthopnea  Gastrointestinal ROS: no abdominal pain, change in bowel habits, or black or bloody stools  Genito-Urinary ROS: no dysuria, trouble voiding, or hematuria  Musculoskeletal ROS: positive for - muscular weakness  Neurological ROS: no TIA or stroke symptoms  Dermatological ROS: negative    Historical Information   Past Medical History:   Diagnosis Date    AAA (abdominal aortic aneurysm) (HCC)     CAD (coronary artery disease)     CHF (congestive heart failure) (HCC)     CKD (chronic kidney disease), stage III     Hypertensive nephrosclerosis     Ischemic cardiomyopathy     Pleural effusion due to CHF (congestive heart failure) (HCC)     Pulmonary HTN (HCC)      Past Surgical History:   Procedure Laterality Date    CARDIAC PACEMAKER PLACEMENT       Social History   History   Alcohol use Not on file     History   Drug use: Unknown     History   Smoking Status    Former Smoker    Types: Cigarettes    Quit date: 2016   Smokeless Tobacco    Never Used     Family History   Problem Relation Age of Onset    Coronary artery disease Father        Meds/Allergies   all current active meds have been reviewed, current meds:   Current Facility-Administered Medications   Medication Dose Route Frequency    acetaminophen (TYLENOL) tablet 650 mg  650 mg Oral 4x Daily PRN    aspirin chewable tablet 81 mg  81 mg Oral Daily    carvedilol (COREG) tablet 3 125 mg  3 125 mg Oral BID With Meals    furosemide (LASIX) injection 40 mg  40 mg Intravenous BID (diuretic)    heparin (porcine) subcutaneous injection 5,000 Units  5,000 Units Subcutaneous Q12H [address]    hydrALAZINE (APRESOLINE) tablet 25 mg  25 mg Oral BID    isosorbide dinitrate (ISORDIL) tablet 20 mg  20 mg Oral Daily    meclizine (ANTIVERT) tablet 12 5 mg  12 5 mg Oral TID PRN    ondansetron (ZOFRAN) injection 4 mg  4 mg Intravenous Q4H PRN    and PTA meds:    No prescriptions prior to admission  No Known Allergies    Objective   No intake or output data in the 24 hours ending 06/23/18 1232    Invasive Devices:        Physical Exam      No intake/output data recorded  Vitals:    06/23/18 0707   BP: 90/60   Pulse: 81   Resp: 20   Temp: (!) 97 3 °F (36 3 °C)   SpO2: 93%       Gen: in NAD, Alert/Awake  HEENT: no sclerous icterus, MMM, neck supple  CV: +S1/S2, RRR  Lungs: decreased bilaterally  Abd: +BS, soft NT/ND  Ext: all four extremities are warm, no edema noted  Skin: no rashes noted  Neuro: CN II-XII intact    Current Weight: Weight - Scale: 50 9 kg (112 lb 3 oz)  First Weight: Weight - Scale: 50 kg (110 lb 3 2 oz)    Lab Results:  I have personally reviewed pertinent labs  CBC: No results found for: WBC, HGB, HCT, MCV, PLT, ADJUSTEDWBC, MCH, MCHC, RDW, MPV, NRBC  CMP: No results found for: NA, K, CL, CO2, ANIONGAP, BUN, CREATININE, GLUCOSE, CALCIUM, AST, ALT, ALKPHOS, PROT, ALBUMIN, BILITOT, EGFR  Phosphorus: No results found for: PHOS  Magnesium: No results found for: MG  Urinalysis: No results found for: [place], SPECGRAV, PHUR, LEUKOCYTESUR, NITRITE, PROTEINUA, GLUCOSEU, KETONESU, BILIRUBINUR, BLOODU  Ionized Calcium: No results found for: CAION  Coagulation: No results found for: PT, INR, APTT  Troponin: No results found for: TROPONINI  ABG: No results found for: PHART, FJB8EXW, PO2ART, HCD9TBQ, K3NWKCAZ, BEART, SOURCE          Invalid input(s): LABALBU    Radiology review:  Procedure: Xr Chest Pa & Lateral    Result Date: 6/22/2018  Narrative: INDICATION:  Weakness  ORDERING PROVIDER: Ilene Blank   TECHNIQUE:  Frontal and lateral chest  COMPARISON: 4/18/18 XR  FINDINGS:  The cardiomediastinal silhouette is enlarged  Left-sided single lead ICD pacemaker is in place  Bibasilar air space opacities are demonstrated with small bilateral effusions and likely mild vascular congestion  There is no pneumothorax  No acute osseous process  IMPRESSION: Bibasilar airspace opacities with small effusions and mild pulmonary vascular congestion    Signed by MD Vipin Escobedo,

## 2018-06-23 NOTE — ASSESSMENT & PLAN NOTE
· His BNP is high but does not appear to be fluid overload  I will continue with diuresis at this time     · F/u renal function in AM    · Cardiology input is appreciated  · Pending echo on Monday

## 2018-06-23 NOTE — ASSESSMENT & PLAN NOTE
Malnutrition Findings:           BMI Findings: Body mass index is 18 11 kg/m²       · Dietary consult  · Added megace  · Will add nutritional supplement

## 2018-06-23 NOTE — ASSESSMENT & PLAN NOTE
· Suspected KASANDRA is due to acute CHF  His baseline Cr is approx  1 7-1 8  Renal input is appreciated  · Will continue with diuretic at this time  · Check PVR per renal, urine studies     · F/u with renal function in AM

## 2018-06-23 NOTE — CONSULTS
Consult - Cardiology   Lael Severs 80 y o  male MRN: [id number]  Unit/Bed#: -01 Encounter: [id number]          Reason For Consult: CHF        ASSESSMENT AND PLAN:     Patient Active Problem List   Diagnosis    Dilated cardiomyopathy [facility] [address] )    ICD (implantable cardioverter-defibrillator) in place    Acute on chronic renal insufficiency    Congestive heart disease ([facility] [address] )       1  Dilated cardiomyopathy:  This is severe but stable  Prognosis is limited  2   ICD in place :  I discussed whether he wants the defibrillator feature turned off and he would like this to remain active  3   Renal insufficiency  Slight worsening  I would certainly hold off on adding ACE inhibitor or ARB at this point  [place] overloaded  4  Congestive heart failure:  BNP is quite high although clinically he does not appear overtly fluid overloaded  Cautious diuresis with attention to creatinine for now  History Of Present Illness: The patient is known to our group  He is an 43-year-old man with a history of severe likely nonischemic cardio myopathy  I have reviewed his most recent hospital stay from April  He has significant frailty and cachexia  Several days ago his long time roommate and her daughter left the house  He has felt weak over the ensuing days and perhaps more short of breath  Finally yesterday he called emergency services and came to the hospital   He has had no chest pain or chest pressure  There is mild to moderate dyspnea  The BNP is over 2000  First troponin is negative  Though there is an ICD in place there has been no recent shocks          Past Medical History:   Severe cardiomyopathy  [employer] ICD  Renal insufficiency              Allergy:  No Known Allergies    Medications:    Current Facility-Administered Medications:     acetaminophen (TYLENOL) tablet 650 mg, 650 mg, Oral, 4x Daily PRN, JESUS Tovar    aspirin chewable tablet 81 mg, 81 mg, Oral, Daily, Wade Malagon MD, 81 mg at 06/23/18 0930    carvedilol (COREG) tablet 3 125 mg, 3 125 mg, Oral, BID With Meals, Zechariah Basilio MD, 3 125 mg at 06/23/18 0930    furosemide (LASIX) injection 40 mg, 40 mg, Intravenous, BID (diuretic), Zechariah Basilio MD, Stopped at 06/23/18 0931    heparin (porcine) subcutaneous injection 5,000 Units, 5,000 Units, Subcutaneous, Q12H [address], Zechariah Basilio MD, 5,000 Units at 06/23/18 0931    hydrALAZINE (APRESOLINE) tablet 25 mg, 25 mg, Oral, BID, Zechariah Basilio MD, Stopped at 06/23/18 0930    isosorbide dinitrate (ISORDIL) tablet 20 mg, 20 mg, Oral, Daily, Zechariah Basilio MD, 20 mg at 06/23/18 0932    meclizine (ANTIVERT) tablet 12 5 mg, 12 5 mg, Oral, TID PRN, Zechariah Basilio MD    ondansetron [facility]) injection 4 mg, 4 mg, Intravenous, Q4H PRN, JESUS Maciel  No prescriptions prior to admission  Family History:  No family history on file  Social History:  History   Smoking Status    Not on file   Smokeless Tobacco    Not on file             ROS:  Review of Systems - Negative except for increasing frailty  Eating is fair  No recent falls but close  Exam:  General: normal,   Head: Normocephalic, atraumatic  Eyes:  No Icterus  Normal Conjunctiva  Neck: supple, symmetrical, trachea midline   Heart: RRR, S1 & S2 normal, systolic murmur: holosystolic 2/6, blowing at apex, PMI is lateral  ICD left subclavian region  Respiratory effort:inspection normal - no chest wall deformities or tenderness, respiratory effort normal  Lungs: normal air entry, lungs clear to auscultation  Abdomen: flat, normal findings: aorta normal   Lower Limbs:  No edema, Right Pulses: FEM: present 2+, POP: present 2+, DP: absent, PT: equal  Musculoskeletal:  Weak  But symmetric ,   Neurologic:Grossly  Symmetric and intact

## 2018-06-24 ENCOUNTER — APPOINTMENT (INPATIENT)
Dept: RADIOLOGY | Facility: HOSPITAL | Age: 83
DRG: 291 | End: 2018-06-24
Payer: MEDICARE

## 2018-06-24 PROBLEM — I42.0 DILATED CARDIOMYOPATHY (HCC): Status: ACTIVE | Noted: 2018-06-23

## 2018-06-24 LAB
CREAT UR-MCNC: 58.4 MG/DL
ERYTHROCYTE [DISTWIDTH] IN BLOOD BY AUTOMATED COUNT: 16.3 % (ref 11.5–14.5)
HCT VFR BLD AUTO: 41.6 % (ref 36.5–49.3)
HGB BLD-MCNC: 13.8 G/DL (ref 14–18)
MCH RBC QN AUTO: 33.3 PG (ref 26–34)
MCHC RBC AUTO-ENTMCNC: 33.3 G/DL (ref 31–37)
MCV RBC AUTO: 100 FL (ref 81–99)
PLATELET # BLD AUTO: 107 THOUSANDS/UL (ref 149–390)
PMV BLD AUTO: 11.5 FL (ref 8.6–11.7)
PREALB SERPL-MCNC: 14.2 MG/DL (ref 18–40)
RBC # BLD AUTO: 4.16 MILLION/UL (ref 4.3–5.9)
UUN 24H UR-MCNC: 526 MG/DL
WBC # BLD AUTO: 7.8 THOUSAND/UL (ref 4.8–10.8)

## 2018-06-24 PROCEDURE — 99232 SBSQ HOSP IP/OBS MODERATE 35: CPT | Performed by: PHYSICIAN ASSISTANT

## 2018-06-24 PROCEDURE — 97166 OT EVAL MOD COMPLEX 45 MIN: CPT

## 2018-06-24 PROCEDURE — 84540 ASSAY OF URINE/UREA-N: CPT | Performed by: INTERNAL MEDICINE

## 2018-06-24 PROCEDURE — 85027 COMPLETE CBC AUTOMATED: CPT | Performed by: NURSE PRACTITIONER

## 2018-06-24 PROCEDURE — G8978 MOBILITY CURRENT STATUS: HCPCS

## 2018-06-24 PROCEDURE — 84134 ASSAY OF PREALBUMIN: CPT | Performed by: INTERNAL MEDICINE

## 2018-06-24 PROCEDURE — 99231 SBSQ HOSP IP/OBS SF/LOW 25: CPT | Performed by: INTERNAL MEDICINE

## 2018-06-24 PROCEDURE — 97162 PT EVAL MOD COMPLEX 30 MIN: CPT

## 2018-06-24 PROCEDURE — G8987 SELF CARE CURRENT STATUS: HCPCS

## 2018-06-24 PROCEDURE — 71045 X-RAY EXAM CHEST 1 VIEW: CPT

## 2018-06-24 PROCEDURE — 82570 ASSAY OF URINE CREATININE: CPT | Performed by: INTERNAL MEDICINE

## 2018-06-24 PROCEDURE — G8979 MOBILITY GOAL STATUS: HCPCS

## 2018-06-24 RX ORDER — FUROSEMIDE 80 MG
80 TABLET ORAL DAILY
Status: DISCONTINUED | OUTPATIENT
Start: 2018-06-25 | End: 2018-06-25 | Stop reason: HOSPADM

## 2018-06-24 RX ADMIN — HEPARIN SODIUM 5000 UNITS: 5000 INJECTION INTRAVENOUS; SUBCUTANEOUS at 08:23

## 2018-06-24 RX ADMIN — CARVEDILOL 3.12 MG: 3.12 TABLET, FILM COATED ORAL at 08:23

## 2018-06-24 RX ADMIN — MEGESTROL ACETATE 20 MG: 40 TABLET ORAL at 21:45

## 2018-06-24 RX ADMIN — FUROSEMIDE 40 MG: 10 INJECTION, SOLUTION INTRAVENOUS at 08:22

## 2018-06-24 RX ADMIN — HEPARIN SODIUM 5000 UNITS: 5000 INJECTION INTRAVENOUS; SUBCUTANEOUS at 21:44

## 2018-06-24 RX ADMIN — MEGESTROL ACETATE 20 MG: 40 TABLET ORAL at 08:23

## 2018-06-24 RX ADMIN — MEGESTROL ACETATE 20 MG: 40 TABLET ORAL at 01:18

## 2018-06-24 RX ADMIN — MEGESTROL ACETATE 20 MG: 40 TABLET ORAL at 18:11

## 2018-06-24 RX ADMIN — MEGESTROL ACETATE 20 MG: 40 TABLET ORAL at 12:35

## 2018-06-24 RX ADMIN — ISOSORBIDE DINITRATE 20 MG: 10 TABLET ORAL at 08:22

## 2018-06-24 RX ADMIN — HYDRALAZINE HYDROCHLORIDE 25 MG: 25 TABLET ORAL at 08:23

## 2018-06-24 RX ADMIN — ASPIRIN 81 MG 81 MG: 81 TABLET ORAL at 08:30

## 2018-06-24 NOTE — ASSESSMENT & PLAN NOTE
We should be able to reduce lasix to 40 mg IV once this morning, then convert to 80 mg PO daily  He was on 80 mg alt with 40 mg as an outpatient, which we should try to restart if he is to be discharged to the outpatient setting

## 2018-06-24 NOTE — PLAN OF CARE
Problem: SAFETY ADULT  Goal: Patient will remain free of falls  INTERVENTIONS:  - Assess patient frequently for physical needs  -  Identify cognitive and physical deficits and behaviors that affect risk of falls  -  [place] fall precautions as indicated by assessment   - Educate patient/family on patient safety including physical limitations  - Instruct patient to call for assistance with activity based on assessment  - Modify environment to reduce risk of injury  - Consider OT/PT consult to assist with strengthening/mobility    Outcome: Progressing      Problem: Nutrition/Hydration-ADULT  Goal: Nutrient/Hydration intake appropriate for improving, restoring or maintaining nutritional needs  Monitor and assess patient's nutrition/hydration status for malnutrition (ex- brittle hair, bruises, dry skin, pale skin and conjunctiva, muscle wasting, smooth red tongue, and disorientation)  Collaborate with interdisciplinary team and initiate plan and interventions as ordered  Monitor patient's weight and dietary intake as ordered or per policy  Utilize nutrition screening tool and intervene per policy  Determine patient's food preferences and provide high-protein, high-caloric foods as appropriate       INTERVENTIONS:  - Monitor oral intake, urinary output, labs, and treatment plans  - Assess nutrition and hydration status and recommend course of action  - Evaluate amount of meals eaten  - Assist patient with eating if necessary   - Allow adequate time for meals  - Recommend/ encourage appropriate diets, oral nutritional supplements, and vitamin/mineral supplements  - Order, calculate, and assess calorie counts as needed  - Recommend, monitor, and adjust tube feedings and TPN/PPN based on assessed needs  - Assess need for intravenous fluids  - Provide specific nutrition/hydration education as appropriate  - Include patient/family/caregiver in decisions related to nutrition    Outcome: Progressing      Problem: Prexisting or High Potential for Compromised Skin Integrity  Goal: Skin integrity is maintained or improved  INTERVENTIONS:  - Identify patients at risk for skin breakdown  - Assess and monitor skin integrity  - Assess and monitor nutrition and hydration status  - Monitor labs (i e  albumin)  - Assess for incontinence   - Turn and reposition patient  - Assist with mobility/ambulation  - Relieve pressure over bony prominences  - Avoid friction and shearing  - Provide appropriate hygiene as needed including keeping skin clean and dry  - Evaluate need for skin moisturizer/barrier cream  - Collaborate with interdisciplinary team (i e  Nutrition, Rehabilitation, etc )   - Patient/family teaching   Outcome: Progressing      Problem: Potential for Falls  Goal: Patient will remain free of falls  INTERVENTIONS:  - Assess patient frequently for physical needs  -  Identify cognitive and physical deficits and behaviors that affect risk of falls    -  [place] fall precautions as indicated by assessment   - Educate patient/family on patient safety including physical limitations  - Instruct patient to call for assistance with activity based on assessment  - Modify environment to reduce risk of injury  - Consider OT/PT consult to assist with strengthening/mobility    Outcome: Progressing

## 2018-06-24 NOTE — ASSESSMENT & PLAN NOTE
· His BNP is high but does not appear to be fluid overload   Lasix adjusted to 80mg PO to start in am    · F/u renal function in AM    · Cardiology input is appreciated  · Pending echo on Monday

## 2018-06-24 NOTE — CASE MANAGEMENT
Initial Clinical Review    Admission: Date/Time/Statement: 6/23/18 @ 0357     Orders Placed This Encounter   Procedures    Inpatient Admission     Standing Status:   Standing     Number of Occurrences:   1     Order Specific Question:   Admitting Physician     Answer:   Hemanth Millan [[id number]]     Order Specific Question:   Level of Care     Answer:   Med Surg [[id number]]     Order Specific Question:   Estimated length of stay     Answer:   More than 2 Midnights     Order Specific Question:   Certification     Answer:   I certify that inpatient services are medically necessary for this patient for a duration of greater than two midnights  See H&P and MD Progress Notes for additional information about the patient's course of treatment  ED: Date/Time/Mode of Arrival:   ED Arrival Information     Patient not seen in ED                       Chief Complaint: No chief complaint on file  History of Illness: The patient is known to our group  He is an 80-year-old man with a history of severe likely nonischemic cardio myopathy  I have reviewed his most recent hospital stay from April  He has significant frailty and cachexia  Several days ago his long time roommate and her daughter left the house  He has felt weak over the ensuing days and perhaps more short of breath  Finally yesterday he called emergency services and came to the hospital   He has had no chest pain or chest pressure  There is mild to moderate dyspnea  The BNP is over 2000  First troponin is negative  Though there is an ICD in place there has been no recent shocks      ED Vital Signs:   ED Triage Vitals   Temperature Pulse Respirations Blood Pressure SpO2   06/23/18 0200 06/23/18 0200 06/23/18 0200 06/23/18 0200 06/23/18 0707   97 5 °F (36 4 °C) 80 18 119/75 93 %      Temp Source Heart Rate Source Patient Position - Orthostatic VS BP Location FiO2 (%)   06/23/18 0200 06/23/18 0200 06/23/18 0200 06/23/18 0200 --   Tympanic Monitor Lying Left arm Pain Score       --               Wt Readings from Last 1 Encounters:   06/24/18 52 2 kg (115 lb)       Vital Signs (abnormal):    above    Abnormal Labs/Diagnostic Test Results:    CXR:    Bibasilar airspace opacities with small effusions and mild pulmonary  vascular congestion       ED Treatment:   Medication Administration - No Administrations Displayed (No Start Event Found)     None          Past Medical/Surgical History: Active Ambulatory Problems     Diagnosis Date Noted    ICD (implantable cardioverter-defibrillator) in place 06/23/2018    Acute on chronic renal insufficiency 06/23/2018    Congestive heart disease ([facility] ) 06/23/2018    CKD (chronic kidney disease), stage III 06/23/2018    Atrial fibrillation ([place] [address] ) 06/23/2018    TIA (transient ischemic attack) 06/23/2018    Pulmonary HTN ([place] [address] ) 06/23/2018    AAA (abdominal aortic aneurysm) ([place] [address] ) 06/23/2018    Hypertensive nephrosclerosis 06/23/2018    Anorexia 06/23/2018     Resolved Ambulatory Problems     Diagnosis Date Noted    No Resolved Ambulatory Problems     Past Medical History:   Diagnosis Date    [facility]) ([facility])     CAD (coronary artery disease)     CHF (congestive heart failure) ([place] [address] ) 06/19/2018    CKD (chronic kidney disease), stage III     Hypertensive nephrosclerosis     Ischemic cardiomyopathy     Pleural effusion due to CHF (congestive heart failure) ([facility])     Pulmonary HTN ([facility])        Admitting Diagnosis: CHF (congestive heart failure) ([place] [address] ) [I50 9]    Age/Sex: 80 y o  male    Assessment/Plan:   * Acute on chronic systolic CHF (congestive heart failure) ([facility])   Assessment & Plan     · His BNP is high but does not appear to be fluid overload  I will continue with diuresis at this time     · F/u renal function in AM    · Cardiology input is appreciated  · Pending echo on Monday           Acute kidney injury superimposed on chronic kidney disease (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 ([facility])   Assessment & Plan     · Continue with furosemide, carvedilol, hydralazine, and isosorbide          Dilated cardiomyopathy ([facility] [address] )   Assessment & Plan     · Status post AICD   Continue with current regimen  · Cardiology is following     The patient will continue to require additional inpatient hospital stay due to aggresive diuretic and close labs monitoring          Admission Orders:   IP   6/23  @     0849  Scheduled Meds:   Current Facility-Administered Medications:  acetaminophen 650 mg Oral 4x Daily PRN Redell Neer, CRNP   aspirin 81 mg Oral Daily Cheo Benitez MD   carvedilol 3 125 mg Oral BID With Meals Cheo Benitez MD   furosemide 40 mg Intravenous BID (diuretic) Cheo Benitez MD   heparin (porcine) 5,000 Units Subcutaneous Q12H 300 Aldo Yang MD   hydrALAZINE 25 mg Oral BID Cheo Benitez MD   isosorbide dinitrate 20 mg Oral Daily Cheo Benitez MD   meclizine 12 5 mg Oral TID PRN Cheo Benitez MD   megestrol 20 mg Oral 4x Daily Redell Neer, CRNP   ondansetron 4 mg Intravenous Q4H PRN Redell Neer, CRNP     Continuous Infusions:    PRN Meds:   acetaminophen    meclizine    ondansetron     2 DE  Cons cardiology  O2  2L  Cons nephrology  Renal U/S   PT/OT

## 2018-06-24 NOTE — ASSESSMENT & PLAN NOTE
Check pre-albumin, encourage PO intake  This is complicated with a near complete lack of appetite at this time  Malnutrition Findings:    patient has temporal wasting       BMI Findings: Body mass index is 18 56 kg/m²

## 2018-06-24 NOTE — PLAN OF CARE
Problem: SAFETY ADULT  Goal: Patient will remain free of falls  INTERVENTIONS:  - Assess patient frequently for physical needs  -  Identify cognitive and physical deficits and behaviors that affect risk of falls    -  [place] fall precautions as indicated by assessment   - Educate patient/family on patient safety including physical limitations  - Instruct patient to call for assistance with activity based on assessment  - Modify environment to reduce risk of injury  - Consider OT/PT consult to assist with strengthening/mobility    Outcome: Progressing    Goal: Maintain or return to baseline ADL function  INTERVENTIONS:  -  Assess patient's ability to carry out ADLs; assess patient's baseline for ADL function and identify physical deficits which impact ability to perform ADLs (bathing, care of mouth/teeth, toileting, grooming, dressing, etc )  - Assess/evaluate cause of self-care deficits   - Assess range of motion  - Assess patient's mobility; develop plan if impaired  - Assess patient's need for assistive devices and provide as appropriate  - Encourage maximum independence but intervene and supervise when necessary  ¯ Involve family in performance of ADLs  ¯ Assess for home care needs following discharge   ¯ Request OT consult to assist with ADL evaluation and planning for discharge  ¯ Provide patient education as appropriate   Outcome: Progressing    Goal: Maintain or return mobility status to optimal level  INTERVENTIONS:  - Assess patient's baseline mobility status (ambulation, transfers, stairs, etc )    - Identify cognitive and physical deficits and behaviors that affect mobility  - Identify mobility aids required to assist with transfers and/or ambulation (gait belt, sit-to-stand, lift, walker, cane, etc )  - [place] fall precautions as indicated by assessment  - Record patient progress and toleration of activity level on Mobility SBAR; progress patient to next Phase/Stage  - Instruct patient to call for assistance with activity based on assessment  - Request Rehabilitation consult to assist with strengthening/weightbearing, etc    Outcome: Progressing      Problem: DISCHARGE PLANNING  Goal: Discharge to home or other facility with appropriate resources  INTERVENTIONS:  - Identify barriers to discharge w/patient and caregiver  - Arrange for needed discharge resources and transportation as appropriate  - Identify discharge learning needs (meds, wound care, etc )  - Arrange for interpretive services to assist at discharge as needed  - Refer to Case Management Department for coordinating discharge planning if the patient needs post-hospital services based on physician/advanced practitioner order or complex needs related to functional status, cognitive ability, or social support system   Outcome: Progressing      Problem: Potential for Falls  Goal: Patient will remain free of falls  INTERVENTIONS:  - Assess patient frequently for physical needs  -  Identify cognitive and physical deficits and behaviors that affect risk of falls    -  [place] fall precautions as indicated by assessment   - Educate patient/family on patient safety including physical limitations  - Instruct patient to call for assistance with activity based on assessment  - Modify environment to reduce risk of injury  - Consider OT/PT consult to assist with strengthening/mobility    Outcome: Progressing

## 2018-06-24 NOTE — PROGRESS NOTES
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 [facility]    Dr Gogo Carranza wished to defer ACEI at this time, which is appropriate given current Cr  Continue to monitor and treat BP within appropriate goals given current clinical issues  6/24: Follow up labs this AM, consider adding a RAAS inhibitor        Congestive heart disease ([facility] )   Assessment & Plan      We should be able to reduce lasix to 40 mg IV once this morning, then convert to 80 mg PO daily  He was on 80 mg alt with 40 mg as an outpatient, which we should try to restart if he is to be discharged to the outpatient setting  Malnutrition ([facility] )   Assessment & Plan      Check pre-albumin, encourage PO intake  This is complicated with a near complete lack of appetite at this time  Malnutrition Findings:    patient has temporal wasting       BMI Findings: Body mass index is 18 56 kg/m²                   Acute on chronic systolic CHF (congestive heart failure) ([facility])    Patient Active Problem List   Diagnosis    [facility] ([facility] )    ICD (implantable cardioverter-defibrillator) in place    Acute on chronic renal insufficiency    Congestive heart disease ([facility] )    Acute kidney injury superimposed on chronic kidney disease ([facility] )    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 ([facility]    CKD (chronic kidney disease), stage III    Atrial fibrillation ([facility]    COPD (chronic obstructive pulmonary disease) ([facility] )    TIA (transient ischemic attack)    Pulmonary HTN ([facility] )    AAA (abdominal aortic aneurysm) ([facility] )    CAD (coronary artery disease)    Hypertensive nephrosclerosis    Anorexia    Malnutrition ([facility] )    Acute on chronic systolic CHF (congestive heart failure) ([facility])         Subjective:     No acute events overnight, patient continues to feel improved overall  Objective:     Vitals: Blood pressure 118/63, pulse 88, temperature (!) 97 °F (36 1 °C), temperature source Tympanic, resp  rate 18, height 5' 6" (1 676 m), weight 52 2 kg (115 lb), SpO2 98 %  ,Body mass index is 18 56 kg/m²  Weight (last 2 days)     Date/Time   Weight    06/24/18 0609  52 2 (115)    06/23/18 0856  50 9 (112 19)    06/23/18 0200  50 (110 2)                Intake/Output Summary (Last 24 hours) at 06/24/18 0915  Last data filed at 06/23/18 2158   Gross per 24 hour   Intake              450 ml   Output               75 ml   Net              375 ml     I/O last 3 completed shifts:   In: 450 [P O :450]  Out: 75 [Urine:75]         Physical Exam: /63 (BP Location: Left arm)   Pulse 88   Temp (!) 97 °F (36 1 °C) (Tympanic)   Resp 18   Ht 5' 6" (1 676 m)   Wt 52 2 kg (115 lb)   SpO2 98%   BMI 18 56 kg/m²     General Appearance:    Alert, cooperative, no distress, temporal wasting   Head:    Normocephalic, without obvious abnormality, atraumatic   Eyes:    Conjunctiva/corneas clear   Ears:    Normal external ears   Nose:   Nares normal, septum midline, mucosa normal, no drainage    or sinus tenderness   Throat:   Lips, mucosa, and tongue normal; teeth and gums normal   Neck:   Supple   Back:     Symmetric, no curvature, ROM normal, no CVA tenderness   Lungs:     Reduced but clear   Chest wall:    No tenderness or deformity   Heart:    Regular rate and rhythm, S1 and S2 normal, no murmur, rub   or gallop   Abdomen:     Soft, non-tender, bowel sounds active   Extremities:   Extremities normal, atraumatic, no cyanosis or edema   Skin:   Skin color, texture, turgor normal, no rashes or lesions   Lymph nodes: Cervical normal   Neurologic:   CNII-XII intact            Lab, Imaging and other studies: I have personally reviewed pertinent labs  CBC: Lab Results   Component Value Date    WBC 7 80 06/24/2018    HGB 13 8 (L) 06/24/2018    HCT 41 6 06/24/2018     (H) 06/24/2018     (L) 06/24/2018    MCH 33 3 06/24/2018    MCHC 33 3 06/24/2018    RDW 16 3 (H) 06/24/2018    MPV 11 5 06/24/2018     CMP: No results found for: NA, K, CL, CO2, ANIONGAP, BUN, CREATININE, GLUCOSE, CALCIUM, AST, ALT, ALKPHOS, PROT, ALBUMIN, BILITOT, EGFR      Invalid input(s): LABALBU      Phosphorus: No results found for: PHOS  Magnesium: No results found for: MG  Urinalysis: No results found for: [place], SPECGRAV, PHUR, LEUKOCYTESUR, NITRITE, PROTEINUA, GLUCOSEU, KETONESU, BILIRUBINUR, BLOODU  Ionized Calcium: No results found for: CAION  Coagulation: No results found for: PT, INR, APTT  Troponin: No results found for: TROPONINI  ABG: No results found for: PHART, BYL7NIH, PO2ART, MEA6ZGF, D4EGXXGY, BEART, SOURCE  Radiology review:     IMAGING  Procedure: Xr Chest Pa & Lateral    Result Date: 6/22/2018  Narrative: INDICATION:  Weakness  ORDERING PROVIDER: Suzy Keith  TECHNIQUE:  Frontal and lateral chest  COMPARISON:  4/18/18 XR  FINDINGS:  The cardiomediastinal silhouette is enlarged  Left-sided single lead ICD pacemaker is in place  Bibasilar air space opacities are demonstrated with small bilateral effusions and likely mild vascular congestion  There is no pneumothorax  No acute osseous process  IMPRESSION: Bibasilar airspace opacities with small effusions and mild pulmonary vascular congestion  Signed by Padmini Lock MD    Procedure: Xr Chest 1 View    Result Date: 6/24/2018  Narrative: INDICATION:  Shortness of breath  ORDERING PROVIDER:      TECHNIQUE:  Frontal chest was obtained at 0657 hours  COMPARISON:  6/22/2018 XR  FINDINGS:  The cardiomediastinal silhouette is large   Left dual defibrillator leads are properly positioned  There is acute pulmonary edema  There is bibasilar vascular congestion with bibasilar effusions  There is a fluid collection in the interlobar minor fissure on the right There is no pneumothorax  No acute osseous process  IMPRESSION: Cardiomegaly  Acute pulmonary edema  Bibasilar vascular congestion with bibasilar effusions and the failure pattern is rapidly progressive in the interval of 48 hours        Signed by Poornima Hair MD      Current Facility-Administered Medications   Medication Dose Route Frequency    acetaminophen (TYLENOL) tablet 650 mg  650 mg Oral 4x Daily PRN    aspirin chewable tablet 81 mg  81 mg Oral Daily    carvedilol (COREG) tablet 3 125 mg  3 125 mg Oral BID With Meals    furosemide (LASIX) injection 40 mg  40 mg Intravenous BID (diuretic)    heparin (porcine) subcutaneous injection 5,000 Units  5,000 Units Subcutaneous Q12H [address]    hydrALAZINE (APRESOLINE) tablet 25 mg  25 mg Oral BID    isosorbide dinitrate (ISORDIL) tablet 20 mg  20 mg Oral Daily    meclizine (ANTIVERT) tablet 12 5 mg  12 5 mg Oral TID PRN    megestrol (MEGACE) tablet 20 mg  20 mg Oral 4x Daily    ondansetron (ZOFRAN) injection 4 mg  4 mg Intravenous Q4H PRN     Medications Discontinued During This Encounter   Medication Reason    ondansetron (ZOFRAN) injection 4 mg        Yumiko Fontenot DO

## 2018-06-24 NOTE — PROGRESS NOTES
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 ([facility] )   Assessment & Plan    On appropriate meds  Renal insufficiency precludes ARB/ACE-I  [facility] ([facility] )   Assessment & Plan    Resolved  Dilated cardiomyopathy ([facility] )   Assessment & Plan    Stable  ICD in place  No overt fluid overload  Subjective: The patient seems better today  He is not dyspneic  He is eating well  He is looking forward to at least short-term rehab stay  No chest pain or chest pressure  No dyspnea  Objective:     Vitals:   Temp (24hrs), Av 8 °F (36 °C), Min:96 4 °F (35 8 °C), Max:97 1 °F (36 2 °C)    HR:  [75-92] 88  Resp:  [18-20] 18  BP: (102-118)/(60-67) 118/63  SpO2:  [92 %-98 %] 98 %  Body mass index is 18 56 kg/m²  Input and Output Summary (last 24 hours): Intake/Output Summary (Last 24 hours) at 18 1301  Last data filed at 18 1038   Gross per 24 hour   Intake              450 ml   Output              175 ml   Net              275 ml       Physical Exam:     Physical Exam   Constitutional: He appears well-developed  HENT:   Head: Normocephalic  Neck: Normal range of motion  Cardiovascular: Normal rate, normal heart sounds and intact distal pulses  Pulmonary/Chest: Effort normal  He has no wheezes  He has no rales  Abdominal: Soft  Musculoskeletal: He exhibits no edema  Skin: Skin is warm  Additional Data:     Labs:      Results from last 7 days  Lab Units 18  0551   WBC Thousand/uL 7 80   HEMOGLOBIN g/dL 13 8*   HEMATOCRIT % 41 6   PLATELETS Thousands/uL 107*       * I Have Reviewed All Lab Data Listed Above  * Additional Pertinent Lab Tests Reviewed:  All Labs Within Last 24 Hours Reviewed    Imaging:  Last 24 Hours Medication List:     Current Facility-Administered Medications:  acetaminophen 650 mg Oral 4x Daily PRN Pierre Callejas CRNP   aspirin 81 mg Oral Daily Rinku Joseph MD   carvedilol 3 125 mg Oral BID With Meals Rinku Joseph MD   furosemide 40 mg Intravenous BID (diuretic) Cornell Luke PA-C   [START ON 6/25/2018] furosemide 80 mg Oral Daily Cornell Luke PA-C   heparin (porcine) 5,000 Units Subcutaneous Q12H 300 Aldo Yang MD   hydrALAZINE 25 mg Oral BID Rinku Joseph MD   isosorbide dinitrate 20 mg Oral Daily Rinku Joseph MD   meclizine 12 5 mg Oral TID PRN Rinku Joseph MD   megestrol 20 mg Oral 4x Daily Pierre NIRU CallejasNP   ondansetron 4 mg Intravenous Q4H PRN JESUS Bowen        Today, Patient Was Seen By: Natalie Arellano MD    ** Please Note: Dragon 360 Dictation voice to text software may have been used in the creation of this document   **

## 2018-06-24 NOTE — ASSESSMENT & PLAN NOTE
· Suspected KASANDRA is due to acute CHF  His baseline Cr is approx  1 7-1 8  Renal following  · Will continue with diuretic at this time  · Check PVR per renal, urine studies     · F/u with renal function in AM

## 2018-06-24 NOTE — PLAN OF CARE

## 2018-06-24 NOTE — PLAN OF CARE
Problem: SAFETY ADULT  Goal: Patient will remain free of falls  INTERVENTIONS:  - Assess patient frequently for physical needs  -  Identify cognitive and physical deficits and behaviors that affect risk of falls  -  [place] fall precautions as indicated by assessment   - Educate patient/family on patient safety including physical limitations  - Instruct patient to call for assistance with activity based on assessment  - Modify environment to reduce risk of injury  - Consider OT/PT consult to assist with strengthening/mobility    Outcome: Progressing      Problem: Potential for Falls  Goal: Patient will remain free of falls  INTERVENTIONS:  - Assess patient frequently for physical needs  -  Identify cognitive and physical deficits and behaviors that affect risk of falls    -  [place] fall precautions as indicated by assessment   - Educate patient/family on patient safety including physical limitations  - Instruct patient to call for assistance with activity based on assessment  - Modify environment to reduce risk of injury  - Consider OT/PT consult to assist with strengthening/mobility    Outcome: Progressing

## 2018-06-24 NOTE — PHYSICAL THERAPY NOTE
06/24/18 0930   Note Type   Note type Eval/Treat   Pain Assessment   Pain Assessment 0-10   Pain Score No Pain   Home Living   Type of Home House   Home Layout Two level   Bathroom Shower/Tub Tub/shower unit   Bathroom Toilet Standard   Bathroom Accessibility Accessible   Home Equipment Walker   Prior Function   Level of [place] Independent with ADLs and functional mobility   Lives With Significant other   ADL Assistance Independent   IADLs Independent   Falls in the last 6 months 0   Vocational Retired   Comments (02 usage at home)   Restrictions/Precautions   [place] Bearing Precautions Per Order No   General   Additional Pertinent History (pt  reports he was at [address] [place] [address] nov  and dec  2017)   Family/Caregiver Present No   Cognition   Overall Cognitive Status WFL   Arousal/Participation Alert   Orientation Level Oriented X4   Memory Within functional limits   Following Commands Follows all commands and directions without difficulty   RLE Assessment   RLE Assessment WFL   LLE Assessment   LLE Assessment WFL   Bed Mobility   Rolling R 5  Supervision   Additional items Assist x 1   Supine to Sit 4  Minimal assistance   Additional items Assist x 1   Transfers   Sit to Stand 4  Minimal assistance   Additional items Assist x 1   Stand to Sit 5  Supervision   Additional items Assist x 1   Ambulation/Elevation   Gait pattern WNL   Gait Assistance 5  Supervision   Additional items Assist x 1   Assistive Device Rolling walker   Distance 5 feet   Additional items (pt  maintained 02 usage throughtout tx session)   Balance   Static Sitting Good   Dynamic Sitting Fair +   Static Standing Fair   Dynamic Standing Fair   Ambulatory Fair   Endurance Deficit   Endurance Deficit No   Activity Tolerance   Activity Tolerance Patient tolerated treatment well   Assessment   Prognosis Excellent   Problem List Decreased strength;Decreased mobility; Impaired balance   Assessment Pt   Is a 80 y o male, admitted 06/23/2018 secondary to acute(on chronic) CHF and referred to PT for a functional assessment  Upon initial assessment, pt  Exhibits decrease muscle strength, decrease balance, gait dysfunction, and decline from PLOF to benefit from skilled inpatient PT interventions and treatment  Barriers to Discharge None   Goals   Patient Goals return home   STG Expiration Date 06/29/18   Short Term Goal #1 Pt  Will ambulate 100 feet with RW, modified independence without LOB or icnrease SOB to/from toilet and progression toward community distances  Pt  Will transfer sit to stand with modified independence(RW) from toilet/bedside/chair without LOB  Pt  Will exhibit greater than 75% carryover of safety awareness and utiltization of RW wit fading cues  LTG Expiration Date 07/04/18   Long Term Goal #1 Pt  Will ambulate 250 feet with RW, modified independence without LOB or icnrease SOB to resume community distances  Pt  Will transfer sit to stand independently from toilet/bedside/chair without LOB  Pt  Will exhibit 100% carryover of safety awareness and AD usage without cues  Plan   Treatment/Interventions Functional transfer training;LE strengthening/ROM; Therapeutic exercise;Patient/family training;Bed mobility;Gait training;Spoke to nursing   PT Frequency 2-3x/wk   Recommendation   Recommendation Home with family support;Home PT   Equipment Recommended Jocy Judge  (pt  has RW at home)   PT - OK to Discharge No   Additional Comments (will monitor pt 's mobility and functional status daily)   Barthel Index   Feeding 10   Bathing 0   Grooming Score 5   Dressing Score 5   Bladder Score 10   Bowels Score 10   Toilet Use Score 5   Transfers (Bed/Chair) Score 10   Mobility (Level Surface) Score 10   Stairs Score 0   Barthel Index Score 65   Roxanne Ramirez, PT

## 2018-06-24 NOTE — OCCUPATIONAL THERAPY NOTE
06/24/18 3495   Restrictions/Precautions   Other Precautions Fall Risk   Pain Assessment   Pain Assessment 0-10   Home Living   Type of Home House   Home Layout Multi-level  (split level)   Bathroom Shower/Tub Tub/shower unit   Home Equipment Walker   Prior Function   Level of [place] Independent with ADLs and functional mobility  (patient reports he has a walker but hasn't used since decemb)   Lives With Alone  (patient reports he has lived [place] since december)   ADL Assistance Independent   IADLs Independent   Falls in the last 6 months 0   Psychosocial   Psychosocial (WDL) WDL   ADL   Where Assessed Supine, bed   Eating Assistance 7  Independent   Grooming Assistance 5  Supervision/Setup   UB Bathing Assistance 5  Supervision/Setup   LB Bathing Assistance 4  Minimal Assistance   UB Dressing Assistance 5  Supervision/Setup   LB Dressing Assistance 4  Minimal [address]  4  Minimal Assistance   Bed Mobility   Supine to Sit 4  Minimal assistance   Sit to Supine 4  Minimal assistance   Transfers   Sit to Stand 4  Minimal assistance   Balance   Static Sitting Good   Dynamic Sitting Good   Static Standing Fair   Dynamic Standing Fair   Activity Tolerance   Activity Tolerance Patient tolerated treatment well   RUE Assessment   RUE Assessment WFL   LUE Assessment   LUE Assessment WFL   Cognition   Overall Cognitive Status WFL   Orientation Level Disoriented X4   Following Commands Follows all commands and directions without difficulty   Assessment   Limitation Decreased endurance   Assessment This 79 y/o patient with exacerbation of CHF resulting in decreased overall strength, balance , activity tolerance and endurance and overall decline in function/ADL's in need of skilled OT tx to enable safe return to [place] of independent living      Goals   Patient Goals (to return to home after hospitalization with therapy )   ADL Goals   Pt Will Perform All ADL's With mod indep  (using pursed lipped brathing techniques without verval cues)   Pt Will Perform Bathing With mod indep   Pt Will Perform LE Dressing With mod indep  (with good safety and using energy conservation strategies)   Pt Will Perform Toileting With mod indep   Plan   Treatment Interventions UE strengthening/ROM; Patient/family training;ADL retraining; Compensatory technique education; Energy conservation   Goal Expiration Date 07/04/18   OT Frequency (4-7 x/wk)     Occupational Therapy Evaluation      Diane Snyder    6/24/2018    Patient Active Problem List   Diagnosis    Dilated cardiomyopathy ([facility]    ICD (implantable cardioverter-defibrillator) in place    Acute on chronic renal insufficiency    Congestive heart disease ([facility]    Acute kidney injury superimposed on chronic kidney disease ([facility] )    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 ([facility] )    CKD (chronic kidney disease), stage III    Atrial fibrillation ([address] [facility]    COPD (chronic obstructive pulmonary disease) ([address] )    TIA (transient ischemic attack)    Pulmonary HTN ([facility]    AAA (abdominal aortic aneurysm) ([facility] )    CAD (coronary artery disease)    Hypertensive nephrosclerosis    Anorexia    Malnutrition ([address] )    Acute on chronic systolic CHF (congestive heart failure) ([address] )       Past Medical History:   Diagnosis Date    AAA (abdominal aortic aneurysm) (HCC)     CAD (coronary artery disease)     CHF (congestive heart failure) ([facility] ) 06/19/2018    CKD (chronic kidney disease), stage III     Hypertensive nephrosclerosis     Ischemic cardiomyopathy     Pleural effusion due to CHF (congestive heart failure) ([address] )     Pulmonary HTN ([address] )        Past Surgical History:   Procedure Laterality Date    CARDIAC PACEMAKER PLACEMENT     Zahra Wade OT

## 2018-06-24 NOTE — PLAN OF CARE
Problem: SAFETY ADULT  Goal: Patient will remain free of falls  INTERVENTIONS:  - Assess patient frequently for physical needs  -  Identify cognitive and physical deficits and behaviors that affect risk of falls  -  [place] fall precautions as indicated by assessment   - Educate patient/family on patient safety including physical limitations  - Instruct patient to call for assistance with activity based on assessment  - Modify environment to reduce risk of injury  - Consider OT/PT consult to assist with strengthening/mobility    Outcome: Progressing      Problem: Potential for Falls  Goal: Patient will remain free of falls  INTERVENTIONS:  - Assess patient frequently for physical needs  -  Identify cognitive and physical deficits and behaviors that affect risk of falls    -  [place] fall precautions as indicated by assessment   - Educate patient/family on patient safety including physical limitations  - Instruct patient to call for assistance with activity based on assessment  - Modify environment to reduce risk of injury  - Consider OT/PT consult to assist with strengthening/mobility    Outcome: Progressing

## 2018-06-24 NOTE — ASSESSMENT & PLAN NOTE
Dr Luly Gutierrez wished to defer ACEI at this time, which is appropriate given current Cr  Continue to monitor and treat BP within appropriate goals given current clinical issues  6/24:  Follow up labs this AM, consider adding a RAAS inhibitor

## 2018-06-24 NOTE — PROGRESS NOTES
Progress Note - Nicolette John 1934, 80 y o  male MRN: [id number]    Unit/Bed#: -01 Encounter: [id number]    Primary Care Provider: Modesto Martínez MD   Date and time admitted to hospital: 6/23/2018  3:57 AM        * Acute on chronic systolic CHF (congestive heart failure) ([facility] )   Assessment & Plan    · His BNP is high but does not appear to be fluid overload  Lasix adjusted to 80mg PO to start in am    · F/u renal function in AM    · Cardiology input is appreciated  · Pending echo on Monday         Dilated cardiomyopathy [facility])   Assessment & Plan    · Status post AICD  Continue with current regimen  · Cardiology is following        COPD (chronic obstructive pulmonary disease) ([facility])   Assessment & Plan    · Continue PRN neb for SOB  · No exacerbation        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 ([facility])   Assessment & Plan    · Continue with furosemide, carvedilol, hydralazine, and isosorbide  · Monitor blood pressure        Acute kidney injury superimposed on chronic kidney disease ([place] [address] )   Assessment & Plan    · Suspected KASANDRA is due to acute CHF  His baseline Cr is approx  1 7-1 8  Renal following  · Will continue with diuretic at this time  · Check PVR per renal, urine studies  · F/u with renal function in AM         CAD (coronary artery disease)   Assessment & Plan    · Continue with current home regimen, Asa, coreg, isosorbide        Malnutrition ([place] [address] )   Assessment & Plan    Malnutrition Findings:           BMI Findings: Body mass index is 18 56 kg/m²  · Dietary consult  · Added megace, 20mg QID  · Added supplement            VTE Pharmacologic Prophylaxis:   Pharmacologic: Heparin  Mechanical VTE Prophylaxis in Place: No    Patient Centered Rounds: I have performed bedside rounds with nursing staff today      Discussions with Specialists or Other Care Team Provider: Cardiology and Nephrology    Education and Discussions with Family / Patient: Patient    Time Spent for Care: 30 minutes  More than 50% of total time spent on counseling and coordination of care as described above  Current Length of Stay: 1 day(s)    Current Patient Status: Inpatient   Certification Statement: The patient will continue to require additional inpatient hospital stay due to diuresis and rehab planning    Discharge Plan: possibly acute rehab unit    Code Status: Level 1 - Full Code      Subjective:   Patient was seen in chair eating lunch, reports his breathing is stable and he always uses 2 Liters of oxygen at home  He reports improved swelling in his legs, denies chest pain, nausea, vomitting, diarrhea  He says he will be living here now upstairs  Will discuss discharge planning with case management in the AM    Objective:     Vitals:   Temp (24hrs), Av 8 °F (36 °C), Min:96 4 °F (35 8 °C), Max:97 1 °F (36 2 °C)    HR:  [75-92] 88  Resp:  [18-20] 18  BP: (102-118)/(60-67) 118/63  SpO2:  [92 %-98 %] 98 %  Body mass index is 18 56 kg/m²  Input and Output Summary (last 24 hours): Intake/Output Summary (Last 24 hours) at 18 1147  Last data filed at 18 1038   Gross per 24 hour   Intake              450 ml   Output              175 ml   Net              275 ml       Physical Exam:     Physical Exam   Constitutional: No distress  Elderly male sitting in chair having lunch   HENT:   Head: Normocephalic and atraumatic  Nose: Nose normal    Mouth/Throat: Oropharynx is clear and moist  No oropharyngeal exudate  Eyes: Conjunctivae and EOM are normal  Pupils are equal, round, and reactive to light  Right eye exhibits no discharge  Left eye exhibits no discharge  Neck: Normal range of motion  Neck supple  No tracheal deviation present  No thyromegaly present  Cardiovascular: Normal rate and regular rhythm  Exam reveals no gallop and no friction rub  Murmur heard  Pulmonary/Chest: Effort normal and breath sounds normal  No respiratory distress  He has no wheezes  He has no rales  He exhibits no tenderness  Decreased breath sound at the bases   Abdominal: Soft  Bowel sounds are normal  He exhibits no distension and no mass  There is no tenderness  There is no rebound and no guarding  Musculoskeletal: Normal range of motion  Neurological: He is alert  No cranial nerve deficit  Skin: Skin is warm and dry  No rash noted  He is not diaphoretic  Psychiatric: He has a normal mood and affect  His behavior is normal          Additional Data:     Labs:      Results from last 7 days  Lab Units 18  0551   WBC Thousand/uL 7 80   HEMOGLOBIN g/dL 13 8*   HEMATOCRIT % 41 6   PLATELETS Thousands/uL 107*           Invalid input(s): LABALBU                  * I Have Reviewed All Lab Data Listed Above  * Additional Pertinent Lab Tests [facility]ed: [facility]    Imaging:    Imaging Reports Reviewed Today Include: yes      Recent Cultures (last 7 days):           Last 24 Hours Medication List:     Current Facility-Administered Medications:  acetaminophen 650 mg Oral 4x Daily PRN Chel Solomon, CRNP   aspirin 81 mg Oral Daily James Manning MD   carvedilol 3 125 mg Oral BID With Meals James Manning MD   furosemide 40 mg Intravenous BID (diuretic) Lisa Nagy PA-C   [START ON 2018] furosemide 80 mg Oral Daily Lisa Nagy PA-C   heparin (porcine) 5,000 Units Subcutaneous Q12H 300 Aldo Yang MD   hydrALAZINE 25 mg Oral BID James Manning MD   isosorbide dinitrate 20 mg Oral Daily James Manning MD   meclizine 12 5 mg Oral TID PRN James Manning MD   megestrol 20 mg Oral 4x Daily Chel Solomon, CRNP   ondansetron 4 mg Intravenous Q4H PRN Chel , CRNP        Today, Patient Was Seen By: Lisa Nagy PA-C    ** Please Note: Dictation voice to text software may have been used in the creation of this document   **

## 2018-06-24 NOTE — ASSESSMENT & PLAN NOTE
Malnutrition Findings:           BMI Findings: Body mass index is 18 56 kg/m²       · Dietary consult  · Added megace, 20mg QID  · Added supplement

## 2018-06-25 ENCOUNTER — HOSPITAL ENCOUNTER (INPATIENT)
Facility: HOSPITAL | Age: 83
LOS: 10 days | Discharge: HOME HEALTH | DRG: 948 | End: 2018-07-05
Attending: PHYSICAL MEDICINE & REHABILITATION | Admitting: PHYSICAL MEDICINE & REHABILITATION
Payer: MEDICARE

## 2018-06-25 ENCOUNTER — APPOINTMENT (INPATIENT)
Dept: NON INVASIVE DIAGNOSTICS | Facility: HOSPITAL | Age: 83
DRG: 291 | End: 2018-06-25
Payer: MEDICARE

## 2018-06-25 ENCOUNTER — APPOINTMENT (INPATIENT)
Dept: RADIOLOGY | Facility: HOSPITAL | Age: 83
DRG: 948 | End: 2018-06-25
Payer: MEDICARE

## 2018-06-25 VITALS
HEART RATE: 92 BPM | OXYGEN SATURATION: 93 % | TEMPERATURE: 96.4 F | BODY MASS INDEX: 24.46 KG/M2 | DIASTOLIC BLOOD PRESSURE: 63 MMHG | RESPIRATION RATE: 18 BRPM | SYSTOLIC BLOOD PRESSURE: 114 MMHG | WEIGHT: 152.2 LBS | HEIGHT: 66 IN

## 2018-06-25 DIAGNOSIS — N18.9 ACUTE KIDNEY INJURY SUPERIMPOSED ON CHRONIC KIDNEY DISEASE (HCC): ICD-10-CM

## 2018-06-25 DIAGNOSIS — I42.0 DILATED CARDIOMYOPATHY (HCC): ICD-10-CM

## 2018-06-25 DIAGNOSIS — E44.0 MODERATE PROTEIN-CALORIE MALNUTRITION (HCC): ICD-10-CM

## 2018-06-25 DIAGNOSIS — N17.9 ACUTE KIDNEY INJURY SUPERIMPOSED ON CHRONIC KIDNEY DISEASE (HCC): ICD-10-CM

## 2018-06-25 DIAGNOSIS — I13.0 HYPERTENSIVE HEART AND KIDNEY DISEASE WITH HF AND WITH CKD STAGE III (HCC): ICD-10-CM

## 2018-06-25 DIAGNOSIS — R63.0 ANOREXIA: Primary | ICD-10-CM

## 2018-06-25 DIAGNOSIS — N18.30 HYPERTENSIVE HEART AND KIDNEY DISEASE WITH HF AND WITH CKD STAGE III (HCC): ICD-10-CM

## 2018-06-25 DIAGNOSIS — I50.23 ACUTE ON CHRONIC SYSTOLIC CHF (CONGESTIVE HEART FAILURE) (HCC): ICD-10-CM

## 2018-06-25 DIAGNOSIS — R53.81 DEBILITY: ICD-10-CM

## 2018-06-25 PROBLEM — I50.9 CONGESTIVE HEART DISEASE (HCC): Chronic | Status: RESOLVED | Noted: 2018-06-23 | Resolved: 2018-06-25

## 2018-06-25 LAB
ANION GAP SERPL CALCULATED.3IONS-SCNC: 10 MMOL/L (ref 4–13)
BUN SERPL-MCNC: 54 MG/DL (ref 7–25)
CALCIUM SERPL-MCNC: 9.1 MG/DL (ref 8.6–10.5)
CHLORIDE SERPL-SCNC: 102 MMOL/L (ref 98–107)
CO2 SERPL-SCNC: 31 MMOL/L (ref 21–31)
CREAT SERPL-MCNC: 2.1 MG/DL (ref 0.7–1.3)
GFR SERPL CREATININE-BSD FRML MDRD: 28 ML/MIN/1.73SQ M
GLUCOSE SERPL-MCNC: 95 MG/DL (ref 65–99)
HCT VFR BLD AUTO: 42.6 % (ref 36.5–49.3)
HGB BLD-MCNC: 14.3 G/DL (ref 14–18)
POTASSIUM SERPL-SCNC: 3.5 MMOL/L (ref 3.5–5.5)
SODIUM SERPL-SCNC: 143 MMOL/L (ref 134–143)

## 2018-06-25 PROCEDURE — 97116 GAIT TRAINING THERAPY: CPT

## 2018-06-25 PROCEDURE — 80048 BASIC METABOLIC PNL TOTAL CA: CPT | Performed by: PHYSICIAN ASSISTANT

## 2018-06-25 PROCEDURE — 93306 TTE W/DOPPLER COMPLETE: CPT

## 2018-06-25 PROCEDURE — 73660 X-RAY EXAM OF TOE(S): CPT

## 2018-06-25 PROCEDURE — 99239 HOSP IP/OBS DSCHRG MGMT >30: CPT | Performed by: PHYSICIAN ASSISTANT

## 2018-06-25 PROCEDURE — 85014 HEMATOCRIT: CPT | Performed by: PHYSICIAN ASSISTANT

## 2018-06-25 PROCEDURE — 85018 HEMOGLOBIN: CPT | Performed by: PHYSICIAN ASSISTANT

## 2018-06-25 RX ORDER — MAGNESIUM HYDROXIDE/ALUMINUM HYDROXICE/SIMETHICONE 120; 1200; 1200 MG/30ML; MG/30ML; MG/30ML
30 SUSPENSION ORAL EVERY 4 HOURS PRN
Status: DISCONTINUED | OUTPATIENT
Start: 2018-06-25 | End: 2018-06-25 | Stop reason: HOSPADM

## 2018-06-25 RX ORDER — BISACODYL 10 MG
10 SUPPOSITORY, RECTAL RECTAL DAILY PRN
Status: CANCELLED | OUTPATIENT
Start: 2018-06-25

## 2018-06-25 RX ORDER — DOCUSATE SODIUM 100 MG/1
100 CAPSULE, LIQUID FILLED ORAL 2 TIMES DAILY
Status: DISCONTINUED | OUTPATIENT
Start: 2018-06-25 | End: 2018-07-05 | Stop reason: HOSPADM

## 2018-06-25 RX ORDER — MEGESTROL ACETATE 40 MG/1
20 TABLET ORAL 4 TIMES DAILY
Status: CANCELLED | OUTPATIENT
Start: 2018-06-25

## 2018-06-25 RX ORDER — ISOSORBIDE DINITRATE 10 MG/1
20 TABLET ORAL DAILY
Status: DISCONTINUED | OUTPATIENT
Start: 2018-06-26 | End: 2018-07-02

## 2018-06-25 RX ORDER — CARVEDILOL 3.12 MG/1
3.12 TABLET ORAL 2 TIMES DAILY WITH MEALS
Status: DISCONTINUED | OUTPATIENT
Start: 2018-06-25 | End: 2018-07-05 | Stop reason: HOSPADM

## 2018-06-25 RX ORDER — ISOSORBIDE DINITRATE 10 MG/1
20 TABLET ORAL DAILY
Status: CANCELLED | OUTPATIENT
Start: 2018-06-26

## 2018-06-25 RX ORDER — POLYETHYLENE GLYCOL 3350 17 G/17G
17 POWDER, FOR SOLUTION ORAL 2 TIMES DAILY
Status: DISCONTINUED | OUTPATIENT
Start: 2018-06-25 | End: 2018-07-05 | Stop reason: HOSPADM

## 2018-06-25 RX ORDER — MECLIZINE HCL 12.5 MG/1
12.5 TABLET ORAL 3 TIMES DAILY PRN
Status: CANCELLED | OUTPATIENT
Start: 2018-06-25

## 2018-06-25 RX ORDER — HYDRALAZINE HYDROCHLORIDE 25 MG/1
25 TABLET, FILM COATED ORAL 2 TIMES DAILY
Status: CANCELLED | OUTPATIENT
Start: 2018-06-25

## 2018-06-25 RX ORDER — MAGNESIUM HYDROXIDE/ALUMINUM HYDROXICE/SIMETHICONE 120; 1200; 1200 MG/30ML; MG/30ML; MG/30ML
30 SUSPENSION ORAL EVERY 4 HOURS PRN
Status: DISCONTINUED | OUTPATIENT
Start: 2018-06-25 | End: 2018-07-05 | Stop reason: HOSPADM

## 2018-06-25 RX ORDER — DOCUSATE SODIUM 100 MG/1
100 CAPSULE, LIQUID FILLED ORAL 2 TIMES DAILY
Status: DISCONTINUED | OUTPATIENT
Start: 2018-06-25 | End: 2018-06-25 | Stop reason: HOSPADM

## 2018-06-25 RX ORDER — MEGESTROL ACETATE 40 MG/1
20 TABLET ORAL 4 TIMES DAILY
Status: DISCONTINUED | OUTPATIENT
Start: 2018-06-25 | End: 2018-06-26

## 2018-06-25 RX ORDER — ONDANSETRON 4 MG/1
4 TABLET, ORALLY DISINTEGRATING ORAL EVERY 6 HOURS PRN
Status: CANCELLED | OUTPATIENT
Start: 2018-06-25

## 2018-06-25 RX ORDER — ACETAMINOPHEN 325 MG/1
650 TABLET ORAL 4 TIMES DAILY PRN
Status: CANCELLED | OUTPATIENT
Start: 2018-06-25

## 2018-06-25 RX ORDER — ONDANSETRON 2 MG/ML
4 INJECTION INTRAMUSCULAR; INTRAVENOUS EVERY 4 HOURS PRN
Status: CANCELLED | OUTPATIENT
Start: 2018-06-25

## 2018-06-25 RX ORDER — SENNOSIDES 8.6 MG
2 TABLET ORAL DAILY
Status: CANCELLED | OUTPATIENT
Start: 2018-06-25

## 2018-06-25 RX ORDER — SENNOSIDES 8.6 MG
2 TABLET ORAL DAILY
Status: DISCONTINUED | OUTPATIENT
Start: 2018-06-25 | End: 2018-06-25 | Stop reason: HOSPADM

## 2018-06-25 RX ORDER — ACETAMINOPHEN 325 MG/1
650 TABLET ORAL 4 TIMES DAILY PRN
Status: DISCONTINUED | OUTPATIENT
Start: 2018-06-25 | End: 2018-07-05 | Stop reason: HOSPADM

## 2018-06-25 RX ORDER — ASPIRIN 81 MG/1
81 TABLET, CHEWABLE ORAL DAILY
Status: CANCELLED | OUTPATIENT
Start: 2018-06-26

## 2018-06-25 RX ORDER — HEPARIN SODIUM 5000 [USP'U]/ML
5000 INJECTION, SOLUTION INTRAVENOUS; SUBCUTANEOUS EVERY 12 HOURS SCHEDULED
Status: DISCONTINUED | OUTPATIENT
Start: 2018-06-25 | End: 2018-07-05 | Stop reason: HOSPADM

## 2018-06-25 RX ORDER — HEPARIN SODIUM 5000 [USP'U]/ML
5000 INJECTION, SOLUTION INTRAVENOUS; SUBCUTANEOUS EVERY 12 HOURS SCHEDULED
Status: CANCELLED | OUTPATIENT
Start: 2018-06-25

## 2018-06-25 RX ORDER — ASPIRIN 81 MG/1
81 TABLET, CHEWABLE ORAL DAILY
Status: DISCONTINUED | OUTPATIENT
Start: 2018-06-26 | End: 2018-07-05 | Stop reason: HOSPADM

## 2018-06-25 RX ORDER — HYDRALAZINE HYDROCHLORIDE 25 MG/1
25 TABLET, FILM COATED ORAL 2 TIMES DAILY
Status: DISCONTINUED | OUTPATIENT
Start: 2018-06-25 | End: 2018-07-02

## 2018-06-25 RX ORDER — ONDANSETRON 2 MG/ML
4 INJECTION INTRAMUSCULAR; INTRAVENOUS EVERY 4 HOURS PRN
Status: DISCONTINUED | OUTPATIENT
Start: 2018-06-25 | End: 2018-06-25

## 2018-06-25 RX ORDER — ONDANSETRON 4 MG/1
4 TABLET, ORALLY DISINTEGRATING ORAL EVERY 6 HOURS PRN
Status: DISCONTINUED | OUTPATIENT
Start: 2018-06-25 | End: 2018-06-25 | Stop reason: HOSPADM

## 2018-06-25 RX ORDER — MAGNESIUM HYDROXIDE/ALUMINUM HYDROXICE/SIMETHICONE 120; 1200; 1200 MG/30ML; MG/30ML; MG/30ML
30 SUSPENSION ORAL EVERY 4 HOURS PRN
Status: CANCELLED | OUTPATIENT
Start: 2018-06-25

## 2018-06-25 RX ORDER — BISACODYL 10 MG
10 SUPPOSITORY, RECTAL RECTAL DAILY PRN
Status: DISCONTINUED | OUTPATIENT
Start: 2018-06-25 | End: 2018-06-25 | Stop reason: HOSPADM

## 2018-06-25 RX ORDER — FAMOTIDINE 20 MG/1
20 TABLET, FILM COATED ORAL DAILY
Status: DISCONTINUED | OUTPATIENT
Start: 2018-06-26 | End: 2018-07-05 | Stop reason: HOSPADM

## 2018-06-25 RX ORDER — POLYETHYLENE GLYCOL 3350 17 G/17G
17 POWDER, FOR SOLUTION ORAL 2 TIMES DAILY
Status: DISCONTINUED | OUTPATIENT
Start: 2018-06-25 | End: 2018-06-25 | Stop reason: HOSPADM

## 2018-06-25 RX ORDER — CARVEDILOL 3.12 MG/1
3.12 TABLET ORAL 2 TIMES DAILY WITH MEALS
Status: CANCELLED | OUTPATIENT
Start: 2018-06-25

## 2018-06-25 RX ORDER — FUROSEMIDE 80 MG
80 TABLET ORAL DAILY
Status: DISCONTINUED | OUTPATIENT
Start: 2018-06-26 | End: 2018-07-02

## 2018-06-25 RX ORDER — BISACODYL 10 MG
10 SUPPOSITORY, RECTAL RECTAL DAILY PRN
Status: DISCONTINUED | OUTPATIENT
Start: 2018-06-25 | End: 2018-07-05 | Stop reason: HOSPADM

## 2018-06-25 RX ORDER — FAMOTIDINE 20 MG/1
20 TABLET, FILM COATED ORAL DAILY
Status: CANCELLED | OUTPATIENT
Start: 2018-06-25

## 2018-06-25 RX ORDER — SENNOSIDES 8.6 MG
2 TABLET ORAL DAILY
Status: DISCONTINUED | OUTPATIENT
Start: 2018-06-26 | End: 2018-07-05 | Stop reason: HOSPADM

## 2018-06-25 RX ORDER — FUROSEMIDE 80 MG
80 TABLET ORAL DAILY
Status: CANCELLED | OUTPATIENT
Start: 2018-06-26

## 2018-06-25 RX ORDER — FAMOTIDINE 20 MG/1
20 TABLET, FILM COATED ORAL DAILY
Status: DISCONTINUED | OUTPATIENT
Start: 2018-06-25 | End: 2018-06-25 | Stop reason: HOSPADM

## 2018-06-25 RX ORDER — ONDANSETRON 4 MG/1
4 TABLET, ORALLY DISINTEGRATING ORAL EVERY 6 HOURS PRN
Status: DISCONTINUED | OUTPATIENT
Start: 2018-06-25 | End: 2018-07-05 | Stop reason: HOSPADM

## 2018-06-25 RX ORDER — MECLIZINE HCL 12.5 MG/1
12.5 TABLET ORAL 3 TIMES DAILY PRN
Status: DISCONTINUED | OUTPATIENT
Start: 2018-06-25 | End: 2018-07-05 | Stop reason: HOSPADM

## 2018-06-25 RX ORDER — DOCUSATE SODIUM 100 MG/1
100 CAPSULE, LIQUID FILLED ORAL 2 TIMES DAILY
Status: CANCELLED | OUTPATIENT
Start: 2018-06-25

## 2018-06-25 RX ORDER — POLYETHYLENE GLYCOL 3350 17 G/17G
17 POWDER, FOR SOLUTION ORAL 2 TIMES DAILY
Status: CANCELLED | OUTPATIENT
Start: 2018-06-25

## 2018-06-25 RX ADMIN — HYDRALAZINE HYDROCHLORIDE 25 MG: 25 TABLET ORAL at 09:25

## 2018-06-25 RX ADMIN — MEGESTROL ACETATE 20 MG: 40 TABLET ORAL at 09:25

## 2018-06-25 RX ADMIN — ASPIRIN 81 MG 81 MG: 81 TABLET ORAL at 09:23

## 2018-06-25 RX ADMIN — MEGESTROL ACETATE 20 MG: 20 TABLET ORAL at 21:04

## 2018-06-25 RX ADMIN — CARVEDILOL 3.12 MG: 3.12 TABLET, FILM COATED ORAL at 18:43

## 2018-06-25 RX ADMIN — FUROSEMIDE 80 MG: 80 TABLET ORAL at 09:23

## 2018-06-25 RX ADMIN — MEGESTROL ACETATE 20 MG: 40 TABLET ORAL at 12:19

## 2018-06-25 RX ADMIN — HEPARIN SODIUM 5000 UNITS: 5000 INJECTION INTRAVENOUS; SUBCUTANEOUS at 21:05

## 2018-06-25 RX ADMIN — DOCUSATE SODIUM 100 MG: 100 CAPSULE, LIQUID FILLED ORAL at 18:43

## 2018-06-25 RX ADMIN — HEPARIN SODIUM 5000 UNITS: 5000 INJECTION INTRAVENOUS; SUBCUTANEOUS at 09:24

## 2018-06-25 RX ADMIN — HYDRALAZINE HYDROCHLORIDE 25 MG: 25 TABLET ORAL at 18:44

## 2018-06-25 RX ADMIN — ISOSORBIDE DINITRATE 20 MG: 10 TABLET ORAL at 09:25

## 2018-06-25 RX ADMIN — CARVEDILOL 3.12 MG: 3.12 TABLET, FILM COATED ORAL at 09:23

## 2018-06-25 NOTE — PHYSICIAN ADVISOR
Current patient class: Inpatient  The patient is currently on Hospital Day: 2 at [address]      The patient was admitted to the hospital at [phone] on 6/23/18 for the following diagnosis:  CHF (congestive heart failure) ([facility] ) [I50 9]       There is documentation in the medical record of an expected length of stay of at least 2 midnights  The patient is therefore expected to satisfy the 2 midnight benchmark and given the 2 midnight presumption is appropriate for INPATIENT ADMISSION  Given this expectation of a satisfying stay, CMS instructs us that the patient is most often appropriate for inpatient admission under part A provided medical necessity is documented in the chart  After review of the relevant documentation, labs, vital signs and test results, the patient is appropriate for INPATIENT ADMISSION  Admission to the hospital as an inpatient is a complex decision making process which requires the practitioner to consider the patients presenting complaint, history and physical examination and all relevant testing  With this in mind, in this case, the patient was deemed appropriate for INPATIENT ADMISSION  After review of the documentation and testing available at the time of the admission I concur with this clinical determination of medical necessity  Rationale is as follows: The patient is a 80 yrs old Male who presented to the ED at 6/23/2018  3:57 AM with a chief complaint of No chief complaint on file  The patient presented with worsening SOB  The patient had recently stopped taking his lasix  The patient is being admitted with acute on chronic systolic CHF and KASANDRA  The plan of care includes IV lasix, nephrology and cardiology consultations, repeat labs, echo  This patient is appropriate for INPATIENT admission as his length of stay is expected to be at least 2 midnights  Continued hospitalization is necessary to ensure stabilization of his clinical status  The patients vitals on arrival were ED Triage Vitals   Temperature Pulse Respirations Blood Pressure SpO2   06/23/18 0200 06/23/18 0200 06/23/18 0200 06/23/18 0200 06/23/18 0707   97 5 °F (36 4 °C) 80 18 119/75 93 %      Temp Source Heart Rate Source Patient Position - Orthostatic VS BP Location FiO2 (%)   06/23/18 0200 06/23/18 0200 06/23/18 0200 06/23/18 0200 --   Tympanic Monitor Lying Left arm       Pain Score       06/24/18 0840       No Pain           Past Medical History:   Diagnosis Date    AAA [facility]abdominal [facility]     CAD (coronary artery disease)     [facility] 06/19/2018    CKD (chronic kidney disease), stage III     Hypertensive nephrosclerosis     Ischemic cardiomyopathy     Pleural effusion due to CHF (congestive heart failure) ([facility])     Pulmonary HTN ([facility] )      Past Surgical History:   Procedure Laterality Date    CARDIAC PACEMAKER PLACEMENT             Consults have been placed to:   IP CONSULT TO CARDIOLOGY  IP CONSULT TO NEPHROLOGY  IP CONSULT TO CASE MANAGEMENT  IP CONSULT TO NUTRITION SERVICES  IP CONSULT TO CASE MANAGEMENT    Vitals:    06/24/18 1156 06/24/18 1458 06/24/18 1950 06/24/18 1952   BP:  102/60  92/50   BP Location:  Right arm  Right arm   Pulse:  91 64    Resp:  18 16    Temp:  (!) 96 9 °F (36 1 °C) (!) 96 7 °F (35 9 °C)    TempSrc:  Tympanic Tympanic    SpO2:  92% 95%    Weight:       Height: 5' 6" (1 676 m)          Most recent labs:    Recent Labs      06/24/18   0551   WBC  7 80   HGB  13 8*   HCT  41 6   PLT  107*       Scheduled Meds:  Current Facility-Administered Medications:  acetaminophen 650 mg Oral 4x Daily PRN Henrietta Room, CRNP   aspirin 81 mg Oral Daily Don Sena MD   carvedilol 3 125 mg Oral BID With Meals Don Sena MD   furosemide 40 mg Intravenous BID (diuretic) Anne Marie Ruiz PA-C   [START ON 6/25/2018] furosemide 80 mg Oral Daily Catalina Pulido PA-C   heparin (porcine) 5,000 Units Subcutaneous Q12H 300 Aldo Yang MD   hydrALAZINE 25 mg Oral BID Elon Merlin, MD   isosorbide dinitrate 20 mg Oral Daily Elon Merlin, MD   meclizine 12 5 mg Oral TID PRN Elon Merlin, MD   megestrol 20 mg Oral 4x Daily JESUS Reyes   ondansetron 4 mg Intravenous Q4H PRN JESUS Reyes     Continuous Infusions:   PRN Meds:   acetaminophen    meclizine    ondansetron    Surgical procedures (if appropriate):

## 2018-06-25 NOTE — PLAN OF CARE
Problem: INFECTION - ADULT  Goal: Absence or prevention of progression during hospitalization  INTERVENTIONS:  - Assess and monitor for signs and symptoms of infection  - Monitor lab/diagnostic results  - Monitor all insertion sites, i e  indwelling lines, tubes, and drains  - Monitor endotracheal (as able) and nasal secretions for changes in amount and color  - [place] appropriate cooling/warming therapies per order  - Administer medications as ordered  - Instruct and encourage patient and family to use good hand hygiene technique  - Identify and instruct in appropriate isolation precautions for identified infection/condition   Outcome: Progressing      Problem: SAFETY ADULT  Goal: Patient will remain free of falls  INTERVENTIONS:  - Assess patient frequently for physical needs  -  Identify cognitive and physical deficits and behaviors that affect risk of falls    -  [place] fall precautions as indicated by assessment   - Educate patient/family on patient safety including physical limitations  - Instruct patient to call for assistance with activity based on assessment  - Modify environment to reduce risk of injury  - Consider OT/PT consult to assist with strengthening/mobility    Outcome: Progressing    Goal: Maintain or return to baseline ADL function  INTERVENTIONS:  -  Assess patient's ability to carry out ADLs; assess patient's baseline for ADL function and identify physical deficits which impact ability to perform ADLs (bathing, care of mouth/teeth, toileting, grooming, dressing, etc )  - Assess/evaluate cause of self-care deficits   - Assess range of motion  - Assess patient's mobility; develop plan if impaired  - Assess patient's need for assistive devices and provide as appropriate  - Encourage maximum independence but intervene and supervise when necessary  ¯ Involve family in performance of ADLs  ¯ Assess for home care needs following discharge   ¯ Request OT consult to assist with ADL evaluation and planning for discharge  ¯ Provide patient education as appropriate   Outcome: Progressing    Goal: Maintain or return mobility status to optimal level  INTERVENTIONS:  - Assess patient's baseline mobility status (ambulation, transfers, stairs, etc )    - Identify cognitive and physical deficits and behaviors that affect mobility  - Identify mobility aids required to assist with transfers and/or ambulation (gait belt, sit-to-stand, lift, walker, cane, etc )  - [place] fall precautions as indicated by assessment  - Record patient progress and toleration of activity level on Mobility SBAR; progress patient to next Phase/Stage  - Instruct patient to call for assistance with activity based on assessment  - Request Rehabilitation consult to assist with strengthening/weightbearing, etc    Marianna Garcia, PT     Outcome: Progressing      Problem: DISCHARGE PLANNING  Goal: Discharge to home or other facility with appropriate resources  INTERVENTIONS:  - Identify barriers to discharge w/patient and caregiver  - Arrange for needed discharge resources and transportation as appropriate  - Identify discharge learning needs (meds, wound care, etc )  - Arrange for interpretive services to assist at discharge as needed  - Refer to Case Management Department for coordinating discharge planning if the patient needs post-hospital services based on physician/advanced practitioner order or complex needs related to functional status, cognitive ability, or social support system   Outcome: Progressing      Problem: Knowledge Deficit  Goal: Patient/family/caregiver demonstrates understanding of disease process, treatment plan, medications, and discharge instructions  Complete learning assessment and assess knowledge base    Interventions:  - Provide teaching at level of understanding  - Provide teaching via preferred learning methods   Outcome: Progressing      Problem: Nutrition/Hydration-ADULT  Goal: Nutrient/Hydration intake appropriate for improving, restoring or maintaining nutritional needs  Monitor and assess patient's nutrition/hydration status for malnutrition (ex- brittle hair, bruises, dry skin, pale skin and conjunctiva, muscle wasting, smooth red tongue, and disorientation)  Collaborate with interdisciplinary team and initiate plan and interventions as ordered  Monitor patient's weight and dietary intake as ordered or per policy  Utilize nutrition screening tool and intervene per policy  Determine patient's food preferences and provide high-protein, high-caloric foods as appropriate       INTERVENTIONS:  - Monitor oral intake, urinary output, labs, and treatment plans  - Assess nutrition and hydration status and recommend course of action  - Evaluate amount of meals eaten  - Assist patient with eating if necessary   - Allow adequate time for meals  - Recommend/ encourage appropriate diets, oral nutritional supplements, and vitamin/mineral supplements  - Order, calculate, and assess calorie counts as needed  - Recommend, monitor, and adjust tube feedings and TPN/PPN based on assessed needs  - Assess need for intravenous fluids  - Provide specific nutrition/hydration education as appropriate  - Include patient/family/caregiver in decisions related to nutrition    Outcome: Progressing      Problem: Prexisting or High Potential for Compromised Skin Integrity  Goal: Skin integrity is maintained or improved  INTERVENTIONS:  - Identify patients at risk for skin breakdown  - Assess and monitor skin integrity  - Assess and monitor nutrition and hydration status  - Monitor labs (i e  albumin)  - Assess for incontinence   - Turn and reposition patient  - Assist with mobility/ambulation  - Relieve pressure over bony prominences  - Avoid friction and shearing  - Provide appropriate hygiene as needed including keeping skin clean and dry  - Evaluate need for skin moisturizer/barrier cream  - Collaborate with interdisciplinary team (i e  Nutrition, Rehabilitation, etc )   - Patient/family teaching   Outcome: Progressing      Problem: Potential for Falls  Goal: Patient will remain free of falls  INTERVENTIONS:  - Assess patient frequently for physical needs  -  Identify cognitive and physical deficits and behaviors that affect risk of falls    -  [place] fall precautions as indicated by assessment   - Educate patient/family on patient safety including physical limitations  - Instruct patient to call for assistance with activity based on assessment  - Modify environment to reduce risk of injury  - Consider OT/PT consult to assist with strengthening/mobility    Outcome: Progressing

## 2018-06-25 NOTE — NURSING NOTE
Patient transferrred to our unit from Med Surg  Patient able to transfer to wheelchair and stand up with minimal assistance  Alert and oriented times 4 and able to make needs known  Voices no complaints of pain  Oriented to unit and call bell usage

## 2018-06-25 NOTE — ASSESSMENT & PLAN NOTE
Check pre-albumin, encourage PO intake  This is complicated with a near complete lack of appetite at this time  6/25:  Patient at 100% of breakfast this morning  He has a very good appetite  Malnutrition Findings:    patient has temporal wasting       BMI Findings: Body mass index is 24 57 kg/m²

## 2018-06-25 NOTE — ASSESSMENT & PLAN NOTE
For patient's renal function stable this morning  He appears to be appropriate from the volume standpoint  We will consider changing Lasix to 80 mg alternating with 40 mg as per his most recent outpatient regimen    Will discuss with hospitalist

## 2018-06-25 NOTE — ASSESSMENT & PLAN NOTE
Dr Yee Folds wished to defer ACEI at this time, which is appropriate given current Cr  Continue to monitor and treat BP within appropriate goals given current clinical issues  6/24: Follow up labs this AM, consider adding a RAAS inhibitor   6/25: Continue to hold off ACE inhibitor as well as ARB, blood pressures have been okay

## 2018-06-25 NOTE — ASSESSMENT & PLAN NOTE
· His BNP is high but does not appear to be fluid overload   Lasix adjusted to 80mg PO   · Cardiology input is appreciated  · Pending echo on Monday   · resolved

## 2018-06-25 NOTE — H&P
H&P Exam - PMR   Kim Sellers 80 y o  male MRN: [id number]  Unit/Bed#: -02 Encounter: [id number]    Rehabilitation Diagnosis: Debility d/t CHF  Etiologic Diagnosis: Debility    Assessment/Plan     Patient Active Problem List   Diagnosis                                COPD (chronic obstructive pulmonary disease) ([facility] )    COPD     O2                        Malnutrition ([place] [address] )  Malnutrition    Megace          Debility  Debility d/t acute on chronic systolic CHF      PT/OT/ST  Hospitalist consult           History of Present Illness   HPI:  Kim Sellers is a 80 y o  male with a h/o chronic systolic chf, poorly compliant who presented to the ER  C/o progressively worsening SOB and weakness  He had not taken his Lasix and was dx with acute on chronic chf  He had undergone thoracocentesis for bilateral pleural effusions in Sept 2017  Patient had been medically managed, is stable but significantly debilitated  He also is quite malnourished  He is now admitted for inptient rehab  Review of Systems   Constitutional: Appetite change: poor appetite  HENT: Dental problem: poor dentition  Eyes: Negative  Respiratory: Negative  Cardiovascular: Negative  Gastrointestinal: Negative  Endocrine: Negative  Genitourinary: Negative  Musculoskeletal:        Generalized muscle atrophy and weakness   Skin: Negative  Allergic/Immunologic: Negative  Neurological: Negative  Hematological: Negative  Psychiatric/Behavioral: Negative  Comprehensive 14 point ROS performed  Patient has generalized weakness and VILLA  Otherwise neg      Historical Information   Past Medical History:   Diagnosis Date    AAA (abdominal aortic aneurysm) ([facility] )     Arthritis     CAD (coronary artery disease)     CHF (congestive heart failure) ([place] [address] ) 06/19/2018    CKD (chronic kidney disease), stage III     COPD (chronic obstructive pulmonary disease) (HCC)     Hypertension     Hypertensive nephrosclerosis  Ischemic cardiomyopathy     Pleural effusion due to CHF (congestive heart failure) (HCC)     Pulmonary HTN (HCC)      Past Surgical History:   Procedure Laterality Date    CARDIAC PACEMAKER PLACEMENT       Social History   History   Alcohol Use    0 6 oz/week    1 Glasses of wine per week     Comment: daily with dinner     History   Drug Use No     History   Smoking Status    Former Smoker    Types: Cigarettes    Quit date: 2016   Smokeless Tobacco    Never Used     Home Setup: Pte has been living alone  States he was I w/o AD, still driving  Lives in a split level home  Retired   Functional Status Prior to Admission: I ADLS and mobility  Functional Status on Admission: I eating, S grooming,UE dressing, MCA bathing,LE dressing,Toileting,transfers  Amb  80ft RW S  Family History   Problem Relation Age of Onset    Coronary artery disease Father        Meds/Allergies   all medications and allergies reviewed  No Known Allergies    Objective   Vitals: Blood pressure 120/58, pulse 59, temperature (!) 95 °F (35 °C), temperature source Tympanic, resp  rate 18, height 5' 6" (1 676 m), weight 51 8 kg (114 lb 3 oz), SpO2 91 %  Invasive Devices          No matching active lines, drains, or airways           Physical Exam   Constitutional: He is oriented to person, place, and time  Pt is frail, appears malnourished  HENT:   Head: Normocephalic and atraumatic  Right Ear: External ear normal    Left Ear: External ear normal    Nose: Nose normal    Mouth/Throat: Oropharynx is clear and moist    Poor dentition   Eyes: Conjunctivae and EOM are normal  Pupils are equal, round, and reactive to light  Neck: Normal range of motion  Neck supple  Cardiovascular: Normal rate, regular rhythm, normal heart sounds and intact distal pulses  L chest pacemaker palpable   Pulmonary/Chest: Effort normal and breath sounds normal    Abdominal: Soft   Bowel sounds are normal    Musculoskeletal: Normal range of motion  Generalized muscle atrophy   Neurological: He is alert and oriented to person, place, and time  He has normal reflexes  Skin: Skin is warm and dry  Psychiatric: He has a normal mood and affect  His behavior is normal  Judgment and thought content normal    Vitals reviewed  Patient is progressing with ADLs and functional mobility, cognition & speech  VITALS:  /58 (BP Location: Right arm)   Pulse 59   Temp (!) 95 °F (35 °C) (Tympanic)   Resp 18   Ht 5' 6" (1 676 m)   Wt 51 8 kg (114 lb 3 oz)   SpO2 91%   BMI 18 43 kg/m²     Laboratory:    Lab Results   Component Value Date    HGB 14 3 06/25/2018    HGB 14 1 06/07/2018    HCT 42 6 06/25/2018    HCT 41 6 (L) 06/07/2018    WBC 7 80 06/24/2018    WBC 7 3 06/07/2018     Lab Results   Component Value Date    BUN 54 (H) 06/25/2018    BUN 38 (H) 06/07/2018     06/25/2018     06/07/2018    K 3 5 06/25/2018    K 3 7 06/07/2018     06/25/2018    CL 96 (L) 06/07/2018    GLUCOSE 95 06/25/2018    CREATININE 2 10 (H) 06/25/2018    CREATININE 2 10 (H) 06/07/2018     Lab Results   Component Value Date    INR 1 20 04/19/2018                  Code Status: Level 1 - Full Code  Advance Directive and Living Will:      Power of :    POLST:      Post Admission Physician Assessment  The patient has the potential to make improvement and is in need of at least 2 of the following multidisciplinary therapies including, but not limited to physical, occupational, speech and respiratory  The patient may also need nutritional services, wound care and prosthetics/orthotics prescription  Given the patient's complex medical condition and risk of further medical complications, rehabilitative services cannot be safely provided at a lower level of care, such as a skilled nursing facility     I have reviewed the patient's functional and medical status at the time of the preadmission screening and they are the same as on the day of this admission  I acknowledge that I have personally performed a full physical examination on this patient within 24 hours of admission  I have determined that the patient is able to tolerate the above course of treatment, at the appropriate level of intensity, for a reasonable period of time  The patient demonstrated understanding the rehabilitation program and the discharge process after we discussed them  Counseling / Coordination of Care  Total floor / unit time spent today 75minutes  Greater than 50% of total time was spent with the patient and / or family counseling and / or coordination of care

## 2018-06-25 NOTE — CASE MANAGEMENT
Pt accepted to ARU per Christin Duvall  Pt aware of same and in agreement  Discussed same with Adriane Schwab  She is also in agreement and will discharge pt

## 2018-06-25 NOTE — ASSESSMENT & PLAN NOTE
· Suspected KASANDRA is due to acute CHF  His baseline Cr is approx  1 7-1 8  Renal following     · Will continue with diuretic at this time

## 2018-06-25 NOTE — ASSESSMENT & PLAN NOTE
· Continue with furosemide, carvedilol, hydralazine, and isosorbide  · Monitor blood pressure  · Ace on hold secondary to renal function

## 2018-06-25 NOTE — SOCIAL WORK
Cm met with pt at bedside  Pt lives with SO and her daughter  Pt reports he was independent with ADL's including driving prior to admission  Acute rehab vs  SNF reccommended after PT evaluation  Pt agreeable to same  ARU consult placed, spoke with [facility] regarding same  Pt agreeable to SNF if ARU declines  Pt was given choices and chose [facility], Referral made to same  Pt's goal is to return home upon after rehab  CM will continue to follow

## 2018-06-25 NOTE — PLAN OF CARE
Problem: PHYSICAL THERAPY ADULT  Goal: Performs mobility at highest level of function for planned discharge setting  See evaluation for individualized goals  Treatment/Interventions: Functional transfer training, LE strengthening/ROM, Therapeutic exercise, Patient/family training, Bed mobility, Gait training, Spoke to nursing  Equipment Recommended: Myrna Wiley       See flowsheet documentation for full assessment, interventions and recommendations  Outcome: Progressing  Prognosis: Excellent  Problem List: Decreased strength, Decreased mobility, Impaired balance  Assessment: Pt tolerated increased ambulatory distance well  Gait was steady with RW   No c/o SOB ambulating without O2  He would benefit from cont'd PT to increase functional mobility  Barriers to Discharge: None     Recommendation: Home with family support, Home PT     PT - OK to Discharge: Yes    See flowsheet documentation for full assessment     Tejal Yang, PTA

## 2018-06-25 NOTE — PHYSICAL THERAPY NOTE
06/25/18 0940   Pain Assessment   Pain Assessment 0-10   Pain Score No Pain   General   Chart Reviewed Yes   Family/Caregiver Present No   Cognition   Overall Cognitive Status WFL   Arousal/Participation Alert; Responsive; Cooperative   Orientation Level Oriented X4   Memory Within functional limits   Following Commands Follows all commands and directions without difficulty   Bed Mobility   Rolling R 5  Supervision   Additional items Assist x 1   Supine to Sit 4  Minimal assistance   Additional items Assist x 1   Sit to Supine 4  Minimal assistance   Additional items Assist x 1   Transfers   Sit to Stand 4  Minimal assistance   Additional items Assist x 1   Stand to Sit 5  Supervision   Additional items Assist x 1   Ambulation/Elevation   Gait pattern WNL   Gait Assistance 5  Supervision   Additional items Assist x 1   Assistive Device Rolling walker   Distance 80 feet   Balance   Static Sitting Good   Dynamic Sitting Fair +   Static Standing Fair   Dynamic Standing Fair   Ambulatory Fair   Endurance Deficit   Endurance Deficit No   Activity Tolerance   Activity Tolerance Patient tolerated treatment well   Assessment   Prognosis Excellent   Problem List Decreased strength;Decreased mobility; Impaired balance   Assessment Pt tolerated increased ambulatory distance well  Gait was steady with RW   No c/o SOB ambulating without O2   He would benefit from cont'd PT to increase functional mobility   Barriers to Discharge None   Goals   Patient Goals return home   Recommendation   Recommendation Home with family support;Home PT   Equipment Recommended José Miguel Jones   PT - OK to Discharge Yes   Beckford Port, PTA

## 2018-06-25 NOTE — PROGRESS NOTES
Progress Note - Noe Rodríguez 1934, 80 y o  male MRN: [id number]    Unit/Bed#: -01 Encounter: [id number]    Primary Care Provider: Julio Duke MD   Date and time admitted to hospital: 6/23/2018  3:57 AM        * Acute on chronic systolic CHF (congestive heart failure) (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 ([facility])   Assessment & Plan    · Continue with furosemide, carvedilol, hydralazine, and isosorbide  · Monitor blood pressure  · Ace on hold secondary to renal function        Acute kidney injury superimposed on chronic kidney disease ([place] [address] )   Assessment & Plan    · Suspected KASANDRA is due to acute CHF  His baseline Cr is approx  1 7-1 8  Renal following  · Will continue with diuretic at this time        CAD (coronary artery disease)   Assessment & Plan    · Continue with current home regimen, Asa, coreg, isosorbide        Malnutrition ([place] [address] )   Assessment & Plan    Malnutrition Findings:           BMI Findings: Body mass index is 24 57 kg/m²  · Dietary consult  · Added megace, 20mg QID  · Added supplement  · Diet has improved          VTE Pharmacologic Prophylaxis:   Pharmacologic: Heparin  Mechanical VTE Prophylaxis in Place: No    Patient Centered Rounds: I have performed bedside rounds with nursing staff today  Discussions with Specialists or Other Care Team Provider: nephrology      Education and Discussions with Family / Patient: patient    Time Spent for Care: 20 minutes    More than 50% of total time spent on counseling and coordination of care as described above  Current Length of Stay: 2 day(s)    Current Patient Status: Inpatient   Certification Statement: The patient will continue to require additional inpatient hospital stay due to await discharge plan with Case Management    Discharge Plan:   Rehab facility on determined at this time bDischarge Plan:    Code Status: Level 1 - Full Code      Subjective:   Patient was seen in bed  He was sleeping and easily arousable  He reports that he was up walking with therapy today  He ate well he has no complaints of chest pain or shortness of breath  report his oxygen has been removed unsure why  He reports that he still feels weak and is not safe for discharge    Objective:     Vitals:   Temp (24hrs), Av 6 °F (35 9 °C), Min:95 5 °F (35 3 °C), Max:97 6 °F (36 4 °C)    HR:  [64-92] 92  Resp:  [16-22] 18  BP: ()/(50-65) 114/63  SpO2:  [86 %-95 %] 93 %  Body mass index is 24 57 kg/m²  Input and Output Summary (last 24 hours): Intake/Output Summary (Last 24 hours) at 18 1031  Last data filed at 18 1612   Gross per 24 hour   Intake                0 ml   Output              200 ml   Net             -200 ml       Physical Exam:     Physical Exam   Constitutional: He is oriented to person, place, and time  Winda North appearing male in no active distress   HENT:   Head: Normocephalic and atraumatic  Nose: Nose normal    Neck: Normal range of motion  Neck supple  No tracheal deviation present  No thyromegaly present  Cardiovascular: Normal rate, regular rhythm and normal heart sounds  Pulmonary/Chest: Effort normal and breath sounds normal  No respiratory distress  He has no wheezes  He has no rales  He exhibits no tenderness  Abdominal: Soft  Bowel sounds are normal  He exhibits no distension and no mass  There is no tenderness  There is no rebound and no guarding     Musculoskeletal:   Positive tenderness on the right great toenail  He has area of black in the corner left medial portion no erythema or warmth   Neurological: He is alert and oriented to person, place, and time  No cranial nerve deficit  Skin: Skin is warm and dry  No rash noted  No erythema  No pallor  Psychiatric: He has a normal mood and affect  His behavior is normal  Judgment and thought content normal    Vitals reviewed  Additional Data:     Labs:      Results from last 7 days  Lab Units 06/25/18  0539 06/24/18  0551   WBC Thousand/uL  --  7 80   HEMOGLOBIN g/dL 14 3 13 8*   HEMATOCRIT % 42 6 41 6   PLATELETS Thousands/uL  --  107*       Results from last 7 days  Lab Units 06/25/18  0539   SODIUM mmol/L 143   POTASSIUM mmol/L 3 5   CHLORIDE mmol/L 102   CO2 mmol/L 31   BUN mg/dL 54*   CREATININE mg/dL 2 10*   CALCIUM mg/dL 9 1   GLUCOSE RANDOM mg/dL 95                     * I Have Reviewed All Lab Data Listed Above  * Additional Pertinent Lab Tests Reviewed: Vi 66 Admission Reviewed    Imaging:    Imaging Reports Reviewed Today Include: yes  I    Recent Cultures (last 7 days):           Last 24 Hours Medication List:     Current Facility-Administered Medications:  acetaminophen 650 mg Oral 4x Daily PRN JESUS Uribe   aspirin 81 mg Oral Daily Law Wagner MD   carvedilol 3 125 mg Oral BID With Meals Law Wagner MD   furosemide 80 mg Oral Daily Ben Borrero PA-C   heparin (porcine) 5,000 Units Subcutaneous Q12H 300 Aldo Yang MD   hydrALAZINE 25 mg Oral BID Law Wagner MD   isosorbide dinitrate 20 mg Oral Daily Law Wagner MD   meclizine 12 5 mg Oral TID PRN Law Wagner MD   megestrol 20 mg Oral 4x Daily JESUS Uribe   ondansetron 4 mg Intravenous Q4H PRN JESUS Uribe        Today, Patient Was Seen By: Ben Borrero PA-C    ** Please Note: Dictation voice to text software may have been used in the creation of this document   **

## 2018-06-25 NOTE — DISCHARGE SUMMARY
Discharge- Citlaly Franklin 1934, 80 y o  male MRN: [id number]    Unit/Bed#: -01 Encounter: [id number]    Primary Care Provider: Raquel Otero MD   Date and time admitted to hospital: 6/23/2018  3:57 AM        * Acute on chronic systolic CHF (congestive heart failure) ([facility] )   Assessment & Plan    · His BNP is high but does not appear to be fluid overload  Lasix adjusted to 80mg PO   · Cardiology input is appreciated  · Pending echo on Monday   · resolved        Dilated cardiomyopathy ([place] [address] )   Assessment & Plan    · Status post AICD  Continue with current regimen  · Cardiology is following        COPD (chronic obstructive pulmonary disease) ([facility])   Assessment & Plan    · Continue PRN neb for SOB  · No exacerbation        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 ([facility])   Assessment & Plan    · Continue with furosemide, carvedilol, hydralazine, and isosorbide  · Monitor blood pressure  · Ace on hold secondary to renal function        Acute kidney injury superimposed on chronic kidney disease ([place] [address] )   Assessment & Plan    · Suspected KASANDRA is due to acute CHF  His baseline Cr is approx  1 7-1 8  Renal following  · Will continue with diuretic at this time        CAD (coronary artery disease)   Assessment & Plan    · Continue with current home regimen, Asa, coreg, isosorbide        Malnutrition ([place] [address] )   Assessment & Plan    Malnutrition Findings:           BMI Findings: Body mass index is 24 57 kg/m²       · Dietary consult  · Added megace, 20mg QID  · Added supplement  · Diet has improved              Discharging Physician / Practitioner: Lisa Nagy PA-C  PCP: Raquel Otero MD  Admission Date:   Admission Orders     Ordered        06/23/18 0849  Inpatient Admission  Once             Discharge Date: 06/25/18    Resolved Problems  Date Reviewed: 6/25/2018          Resolved    Congestive heart disease ([place] [address] ) 6/25/2018     Resolved by  Lisa Nagy PA-C Consultations During Hospital Stay:  · Dr Shy Tamez Cardiology  · Dr Lazara Medley nephrology    Procedures Performed:     · X-rays toe pending  · Chest x-ray with cardiomegaly and acute pulmonary edema    Significant Findings / Test Results:     · Monitor renal function    Incidental Findings:   · None    Test Results Pending at Discharge (will require follow up):   · X-ray of the right toe pending     Outpatient Tests Requested:  · None    Complications:  None    Reason for Admission:  Weakness    Hospital Course:     Michael Lyons is a 80 y o  male patient who originally presented to the hospital on 6/23/2018 due to progressive weakness and shortness of breath  The patient was admitted with an elevated BNP and was followed by Cardiology for heart failure and Nephrology for elevated renal function  Patient was diuresed as able  Renal function was monitored  He was evaluated by physical therapy and current plan is to go to the acute rehab facility  Please see above list of diagnoses and related plan for additional information  Condition at Discharge: fair     Discharge Day Visit / Exam:     * Please refer to separate progress note for these details *    Discussion with Family: n/a    Discharge instructions/Information to patient and family:   See after visit summary for information provided to patient and family  Provisions for Follow-Up Care:  See after visit summary for information related to follow-up care and any pertinent home health orders  Disposition:     Acute Rehab at [facility] AND SURGICAL [facility] Readmission: none     Discharge Statement:  I spent 45 minutes discharging the patient  This time was spent on the day of discharge  I had direct contact with the patient on the day of discharge   Greater than 50% of the total time was spent examining patient, answering all patient questions, arranging and discussing plan of care with patient as well as directly providing post-discharge instructions  Additional time then spent on discharge activities  Discharge Medications:  See after visit summary for reconciled discharge medications provided to patient and family        ** Please Note: This note has been constructed using a voice recognition system **

## 2018-06-25 NOTE — ASSESSMENT & PLAN NOTE
Malnutrition Findings:           BMI Findings: Body mass index is 24 57 kg/m²       · Dietary consult  · Added megace, 20mg QID  · Added supplement  · Diet has improved

## 2018-06-25 NOTE — PROGRESS NOTES
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 [facility]    Dr Gogo Carranza wished to defer ACEI at this time, which is appropriate given current Cr  Continue to monitor and treat BP within appropriate goals given current clinical issues  6/24: Follow up labs this AM, consider adding a RAAS inhibitor   6/25: Continue to hold off ACE inhibitor as well as ARB, blood pressures have been okay  Malnutrition ([facility] )   Assessment & Plan      Check pre-albumin, encourage PO intake  This is complicated with a near complete lack of appetite at this time  6/25:  Patient at 100% of breakfast this morning  He has a very good appetite  Malnutrition Findings:    patient has temporal wasting       BMI Findings: Body mass index is 24 57 kg/m²                 Follow up reason for today's visit:  Acute kidney injury, Chronic kidney disease    Acute on chronic systolic CHF (congestive heart failure) ([facility] )    Patient Active Problem List   Diagnosis    Dilated cardiomyopathy ([facility] )    ICD (implantable cardioverter-defibrillator) in place    Acute on chronic renal insufficiency    Congest[facility] disease ([facility] )    Acute kidney injury superimposed on chronic kidney disease (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 )    CKD (chronic kidney disease), stage III    [facility]    COPD (chronic obstructive pulmonary disease) (HCC)    TIA (transient ischemic attack)    Pulmonary HTN [facility]    AAA (abdominal aortic aneurysm) ([facility] )    CAD (coronary artery disease)    Hypertensive nephrosclerosis    Anorexia    Malnutrition (HCC)    Acute on chronic systolic CHF (congestive heart failure) ([facility])         Subjective:   Acute events overnight    Objective:     Vitals: Blood pressure 114/63, pulse 92, temperature (!) 96 4 °F (35 8 °C), temperature source Tympanic, resp  rate 18, height 5' 6" (1 676 m), weight 69 kg (152 lb 3 2 oz), SpO2 (!) 86 %  ,Body mass index is 24 57 kg/m²      Weight (last 2 days)     Date/Time   Weight    06/25/18 0550  69 (152 2)    06/25/18 0549  69 (152 2)    06/25/18 0424  50 3 (111)    06/24/18 1154  52 2 (115)    06/24/18 1136  52 2 (115)    06/24/18 0609  52 2 (115)    06/23/18 0856  50 9 (112 19)    06/23/18 0200  50 (110 2)                Intake/Output Summary (Last 24 hours) at 06/25/18 0902  Last data filed at 06/24/18 1612   Gross per 24 hour   Intake                0 ml   Output              200 ml   Net             -200 ml     I/O last 3 completed shifts:  In: -   Out: 275 [Urine:275]         Physical Exam: /63 (BP Location: Left arm)   Pulse 92   Temp (!) 96 4 °F (35 8 °C) (Tympanic)   Resp 18   Ht 5' 6" (1 676 m)   Wt 69 kg (152 lb 3 2 oz)   SpO2 (!) 86%   BMI 24 57 kg/m²     General Appearance:    Alert, cooperative, no distress, appears stated age   Head:    Normocephalic, without obvious abnormality, atraumatic   Eyes:    Conjunctiva/corneas clear   Ears:    Normal external ears   Nose:   Nares normal, septum midline, mucosa normal, no drainage    or sinus tenderness   Throat:   Lips, mucosa, and tongue normal; teeth and gums normal   Neck:   Supple   Back:     Symmetric, no curvature, ROM normal, no CVA tenderness   Lungs:     Clear to auscultation bilaterally, respirations unlabored   Chest wall:    No tenderness or deformity   Heart:    Regular rate and rhythm, S1 and S2 normal, no murmur, rub   or gallop   Abdomen:     Soft, non-tender, bowel sounds active   Extremities:   Extremities normal, atraumatic, no cyanosis or edema   Skin:   Skin color, texture, turgor normal, no rashes or lesions   Lymph nodes:   Cervical normal   Neurologic:   CNII-XII intact            Lab, Imaging and other studies: I have personally reviewed pertinent labs  CBC: Lab Results   Component Value Date    HGB 14 3 06/25/2018    HCT 42 6 06/25/2018     CMP: Lab Results   Component Value Date     06/25/2018    K 3 5 06/25/2018     06/25/2018    CO2 31 06/25/2018    ANIONGAP 10 06/25/2018    BUN 54 (H) 06/25/2018    CREATININE 2 10 (H) 06/25/2018    GLUCOSE 95 06/25/2018    CALCIUM 9 1 06/25/2018    EGFR 28 06/25/2018         Results from last 7 days  Lab Units 06/25/18  0539   SODIUM mmol/L 143   POTASSIUM mmol/L 3 5   CHLORIDE mmol/L 102   CO2 mmol/L 31   BUN mg/dL 54*   CREATININE mg/dL 2 10*   CALCIUM mg/dL 9 1   GLUCOSE RANDOM mg/dL 95         Phosphorus: No results found for: PHOS  Magnesium: No results found for: MG  Urinalysis: No results found for: COLORU, CLARITYU, SPECGRAV, PHUR, LEUKOCYTESUR, NITRITE, PROTEINUA, GLUCOSEU, KETONESU, BILIRUBINUR, BLOODU  Ionized Calcium: No results found for: CAION  Coagulation: No results found for: PT, INR, APTT  Troponin: No results found for: TROPONINI  ABG: No results found for: PHART, UCB7UKS, PO2ART, LAL1QMY, D8FAYMLS, BEART, SOURCE  Radiology review:     IMAGING  Procedure: Xr Chest Pa & Lateral    Result Date: 6/22/2018  Narrative: INDICATION:  Weakness  ORDERING PROVIDER: Suzy Keith  TECHNIQUE:  Frontal and lateral chest  COMPARISON:  4/18/18 XR  FINDINGS:  The cardiomediastinal silhouette is enlarged  Left-sided single lead ICD pacemaker is in place  Bibasilar air space opacities are demonstrated with small bilateral effusions and likely mild vascular congestion    There is no pneumothorax  No acute osseous process  IMPRESSION: Bibasilar airspace opacities with small effusions and mild pulmonary vascular congestion  Signed by Mojgan Galindo MD    Procedure: Xr Chest 1 View    Result Date: 6/24/2018  Narrative: INDICATION:  Shortness of breath  ORDERING PROVIDER:      TECHNIQUE:  Frontal chest was obtained at 0657 hours  COMPARISON:  6/22/2018 XR  FINDINGS:  The cardiomediastinal silhouette is large  Left dual defibrillator leads are properly positioned  There is acute pulmonary edema  There is bibasilar vascular congestion with bibasilar effusions  There is a fluid collection in the interlobar minor fissure on the right There is no pneumothorax  No acute osseous process  IMPRESSION: Cardiomegaly  Acute pulmonary edema  Bibasilar vascular congestion with bibasilar effusions and the failure pattern is rapidly progressive in the interval of 48 hours        Signed by Daljit Dean MD      Current Facility-Administered Medications   Medication Dose Route Frequency    acetaminophen (TYLENOL) tablet 650 mg  650 mg Oral 4x Daily PRN    aspirin chewable tablet 81 mg  81 mg Oral Daily    carvedilol (COREG) tablet 3 125 mg  3 125 mg Oral BID With Meals    furosemide (LASIX) tablet 80 mg  80 mg Oral Daily    heparin (porcine) subcutaneous injection 5,000 Units  5,000 Units Subcutaneous Q12H [address]    hydrALAZINE (APRESOLINE) tablet 25 mg  25 mg Oral BID    isosorbide dinitrate (ISORDIL) tablet 20 mg  20 mg Oral Daily    meclizine (ANTIVERT) tablet 12 5 mg  12 5 mg Oral TID PRN    megestrol (MEGACE) tablet 20 mg  20 mg Oral 4x Daily    ondansetron (ZOFRAN) injection 4 mg  4 mg Intravenous Q4H PRN     Medications Discontinued During This Encounter   Medication Reason    ondansetron (ZOFRAN) injection 4 mg        Kingsley Lerma DO

## 2018-06-26 ENCOUNTER — APPOINTMENT (INPATIENT)
Dept: RADIOLOGY | Facility: HOSPITAL | Age: 83
DRG: 948 | End: 2018-06-26
Payer: MEDICARE

## 2018-06-26 LAB
ANION GAP SERPL CALCULATED.3IONS-SCNC: 10 MMOL/L (ref 4–13)
BASOPHILS # BLD AUTO: 0.1 THOUSANDS/ΜL (ref 0–0.1)
BASOPHILS NFR BLD AUTO: 1 % (ref 0–2)
BUN SERPL-MCNC: 53 MG/DL (ref 7–25)
CALCIUM SERPL-MCNC: 8.6 MG/DL (ref 8.6–10.5)
CHLORIDE SERPL-SCNC: 103 MMOL/L (ref 98–107)
CO2 SERPL-SCNC: 31 MMOL/L (ref 21–31)
CREAT SERPL-MCNC: 1.88 MG/DL (ref 0.7–1.3)
EOSINOPHIL # BLD AUTO: 0.3 THOUSAND/ΜL (ref 0–0.61)
EOSINOPHIL NFR BLD AUTO: 4 % (ref 0–5)
ERYTHROCYTE [DISTWIDTH] IN BLOOD BY AUTOMATED COUNT: 16.7 % (ref 11.5–14.5)
GFR SERPL CREATININE-BSD FRML MDRD: 32 ML/MIN/1.73SQ M
GLUCOSE SERPL-MCNC: 82 MG/DL (ref 65–99)
HCT VFR BLD AUTO: 40 % (ref 36.5–49.3)
HGB BLD-MCNC: 13.4 G/DL (ref 14–18)
LYMPHOCYTES # BLD AUTO: 0.5 THOUSANDS/ΜL (ref 0.6–4.47)
LYMPHOCYTES NFR BLD AUTO: 6 % (ref 21–51)
MCH RBC QN AUTO: 33.4 PG (ref 26–34)
MCHC RBC AUTO-ENTMCNC: 33.4 G/DL (ref 31–37)
MCV RBC AUTO: 100 FL (ref 81–99)
MONOCYTES # BLD AUTO: 0.6 THOUSAND/ΜL (ref 0.17–1.22)
MONOCYTES NFR BLD AUTO: 8 % (ref 2–12)
NEUTROPHILS # BLD AUTO: 6.5 THOUSANDS/ΜL (ref 1.4–6.5)
NEUTS SEG NFR BLD AUTO: 82 % (ref 42–75)
NRBC BLD AUTO-RTO: 0 /100 WBCS
PLATELET # BLD AUTO: 104 THOUSANDS/UL (ref 149–390)
PMV BLD AUTO: 11.1 FL (ref 8.6–11.7)
POTASSIUM SERPL-SCNC: 3.1 MMOL/L (ref 3.5–5.5)
RBC # BLD AUTO: 4 MILLION/UL (ref 4.3–5.9)
SODIUM SERPL-SCNC: 144 MMOL/L (ref 134–143)
WBC # BLD AUTO: 8 THOUSAND/UL (ref 4.8–10.8)

## 2018-06-26 PROCEDURE — 97535 SELF CARE MNGMENT TRAINING: CPT

## 2018-06-26 PROCEDURE — G0515 COGNITIVE SKILLS DEVELOPMENT: HCPCS

## 2018-06-26 PROCEDURE — 92610 EVALUATE SWALLOWING FUNCTION: CPT

## 2018-06-26 PROCEDURE — 80048 BASIC METABOLIC PNL TOTAL CA: CPT | Performed by: PHYSICAL MEDICINE & REHABILITATION

## 2018-06-26 PROCEDURE — 97530 THERAPEUTIC ACTIVITIES: CPT

## 2018-06-26 PROCEDURE — 97116 GAIT TRAINING THERAPY: CPT

## 2018-06-26 PROCEDURE — 96125 COGNITIVE TEST BY HC PRO: CPT

## 2018-06-26 PROCEDURE — 71045 X-RAY EXAM CHEST 1 VIEW: CPT

## 2018-06-26 PROCEDURE — 97165 OT EVAL LOW COMPLEX 30 MIN: CPT

## 2018-06-26 PROCEDURE — 97537 COMMUNITY/WORK REINTEGRATION: CPT

## 2018-06-26 PROCEDURE — 85025 COMPLETE CBC W/AUTO DIFF WBC: CPT | Performed by: PHYSICAL MEDICINE & REHABILITATION

## 2018-06-26 PROCEDURE — 97163 PT EVAL HIGH COMPLEX 45 MIN: CPT

## 2018-06-26 RX ORDER — MEGESTROL ACETATE 20 MG/1
20 TABLET ORAL 4 TIMES DAILY
Status: DISCONTINUED | OUTPATIENT
Start: 2018-06-26 | End: 2018-07-05 | Stop reason: HOSPADM

## 2018-06-26 RX ADMIN — MEGESTROL ACETATE 20 MG: 20 TABLET ORAL at 09:56

## 2018-06-26 RX ADMIN — HEPARIN SODIUM 5000 UNITS: 5000 INJECTION INTRAVENOUS; SUBCUTANEOUS at 21:22

## 2018-06-26 RX ADMIN — MEGESTROL ACETATE 20 MG: 20 TABLET ORAL at 18:54

## 2018-06-26 RX ADMIN — MEGESTROL ACETATE 20 MG: 20 TABLET ORAL at 21:22

## 2018-06-26 RX ADMIN — FAMOTIDINE 20 MG: 20 TABLET, FILM COATED ORAL at 09:54

## 2018-06-26 RX ADMIN — ASPIRIN 81 MG 81 MG: 81 TABLET ORAL at 09:55

## 2018-06-26 RX ADMIN — MEGESTROL ACETATE 20 MG: 20 TABLET ORAL at 12:30

## 2018-06-26 RX ADMIN — DOCUSATE SODIUM 100 MG: 100 CAPSULE, LIQUID FILLED ORAL at 09:55

## 2018-06-26 RX ADMIN — SENNOSIDES 17.2 MG: 8.6 TABLET, FILM COATED ORAL at 09:55

## 2018-06-26 RX ADMIN — HEPARIN SODIUM 5000 UNITS: 5000 INJECTION INTRAVENOUS; SUBCUTANEOUS at 09:55

## 2018-06-26 RX ADMIN — POLYETHYLENE GLYCOL 3350 17 G: 17 POWDER, FOR SOLUTION ORAL at 09:56

## 2018-06-26 NOTE — NURSING NOTE
Pt slept majority of shift  Used urinal overnight  Pt attempted to get OOB x 1 overnight without assistance  When staff attempted to place bed alarm on bed, pt became very angry and stated he would leave  Pt agreed that he would stay in bed and ring call bell for assistance  Otherwise pt unchanged from shift assessment

## 2018-06-26 NOTE — PCC PHYSICAL THERAPY
Patient seen for IE today  Please refer to assessment for complete details  Patient supervision bed mobility, and CG for all transfers and mobility using RW  Patient limited by decreased ROM/strength, decreased balance and safety  Would benefit from continued inpatient PT to increase function, safety and independence with RW in prep for safe d/c to home  7/2/18: Patient participating in therapy but making slow progress  Patient supervision bed mobility; CG transfers, gait, carpet , ramp with RW; CG 5 steps with 2 handrails  Patient limited by decreased ROM/strength; decreased balance and safety, decreased endurance  Requires general safety cues as well as for O2 management  Would benefit from continued inpatient PT to increase function, safety, and increased independence with functional mobility in prep for safe d/c to home

## 2018-06-26 NOTE — PROGRESS NOTES
I went in to check pt for lunch  Pt was having trouble chewing, RN made aware as well as speech therapist  Speech therapy has taken over and interventions are being made

## 2018-06-26 NOTE — TEAM CONFERENCE
Acute RehabilitationTe Conference Note  Date: 6/26/2018   Time: 11:58 AM       Patient Name:  Yasmany Jackman       Medical Record Number: [id number]   YOB: 1934  Sex: Male          Room/Bed:  [facility] 217-02  Payor Info:  Payor: MEDICARE / Plan: MEDICARE A AND B / Product Type: Medicare A & B Fee for Service /      Admitting Diagnosis: Shortness of breath [R06 02]   Admit Date/Time:  6/25/2018  3:57 PM  Admission Comments: No comment available     Primary Diagnosis:  Debility  Principal Problem: Debility    Patient Active Problem List    Diagnosis Date Noted    Debility 06/25/2018    Dilated cardiomyopathy ([facility] ) 06/23/2018    ICD (implantable cardioverter-defibrillator) in place 06/23/2018    Acute on chronic renal insufficiency 06/23/2018    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 ([facility] ) 06/23/2018    CKD (chronic kidney disease), stage III 06/23/2018    Atrial fibrillation ([facility] ) 06/23/2018    [facility] ([facility] disease) [facility] 06/23/2018    TIA (transient ischemic attack) 06/23/2018    Pulmonary HTN ([facility] ) 06/23/2018    AAA (abdominal aortic aneurysm) ([facility] ) 06/23/2018    CAD (coronary artery disease) 06/23/2018    Hypertensive nephrosclerosis 06/23/2018    Anorexia 06/23/2018    Malnutrition ([facility] ) 06/23/2018    Acute on chronic systolic CHF (congestive heart failure) ([facility] ) 06/23/2018    Acute kidney injury superimposed on chronic kidney disease ([facility] )        Physical Therapy:    Weight Bearing Status: Full Weight Bearing  Transfers: Contact Guard  Bed Mobility: Supervision  Amulation Distance (ft): 120 feet  Ambulation: Contact Guard  Assistive Device for Ambulation: Roller Walker  Number of Stairs: 5  Assistive Device for Stairs: Bilateral Hand Rails  Stair Assistance: Contact Guard  Ramp: Contact Guard  Assistive Device for Ramp: Roller Walker  Discharge Recommendations: Home Independently  [address] [facility] with[de-identified] Family Support, Home Physical Therapy    Patient seen for IE today  Please refer to assessment for complete details  Patient supervision bed mobility, and CG for all transfers and mobility using RW  Patient limited by decreased ROM/strength, decreased balance and safety  Would benefit from continued inpatient PT to increase function, safety and independence with RW in prep for safe d/c to home  Occupational Therapy:  Eating: Independent  Grooming: Supervision  Upper Body Dressing: Minimal Assistance  Lower Body Dressing: Moderate Assistance  Toileting:  (TBA)  Tub/Shower Transfer:  (TBA)  Toilet Transfer:  (TBA)  Cognition: Within Defined Limits  Orientation: Person, Place, Time, Situation  Discharge Recommendations: Home with:  [address] with[de-identified] Family Support       6/26/18: Patient evaluated this day  Requires assist secondary to decreased balance, safety and endurance  Patient's current level of function as folows: I eating, s/u grooming, min A UB dressing, toileting and toileting transfer, and mod A LB dressing  Pt agreeable to skilled OT services and will benefit to increase independence with daily tasks  Speech Therapy:     ST to evaluate for speech, cog, and dysphagia    Nursing Notes:  Appetite: Fair  Diet Type: Cardiac                      Diet Patient/Family Education Complete: No                            Bladder: Continent     Bladder Patient/Family Education: No                 Pain Score: 0                                  Pt is new admission 6/25/18  Alert and oriented  Lungs clear but diminished bilateral bases  Hr regular  No edema  Pt bilateral buttocks and bilateral heels pink/blancheable  Pt has waffle mattress in place on be  Pt has scab on R great toe which was causing pain prior to admission  Continent of bowel and bladder  Pt is assist of one with transfer into bed this past evening  Is the patient actively participating in therapies?  yes  List any modifications to the treatment plan: na    Barriers Interventions   Decreased strength  Therapeutic ex/ activity   Decreased balance ADL/transfer/gait training   Decreased endurance Therapeutic ex/activity   Dysphagia ST to eval         Is the patient making expected progress toward goals?  New eval  List any update or changes to goals: na      Medical Goals: Patient will be able to manage medical conditions and comorbid conditions with medications and follow up upon completion of rehab program    Weekly Team Goals:    Modified independent self care, transfers, and mobility with RW    Discussion: Anticipate home with family with John Abarca for PT    Anticipated Discharge Date:  July 5, 2018

## 2018-06-26 NOTE — PROGRESS NOTES
OT Evaluation  Please refer to following information for details and patient needs  06/26/18 0700   Home Setup   Type of Home Multi Level   Number of Stairs in Home (flight)   Available Equipment Roller Walker  (O2)   Baseline Information   Vocation Other  (retired)   Transportation    Prior IADL Participation   Money Management Combine Bills   Meal Preparation Partial Participation   Laundry Partial Participation   Home Cleaning Partial Participation   Prior Level of Function   Self-Care 3  Independent - Patient completed the activities by him/herself, with or without an assistive device, with no assistance from a helper  Indoor-Mobility (Ambulation) 3  Independent - Patient completed the activities by him/herself, with or without an assistive device, with no assistance from a helper  Patient Preference   Nickname (Patient Preference) Ruthie Bear   Restrictions/Precautions   Weight Bearing Restrictions No   Pain Assessment   Pain Assessment No/denies pain   QI: Eating   Assistance Needed Independent   Eating CARE Score 6   Eating Assessment   Eating (FIM) 7 - Patient completely independent   QI: Oral Hygiene   Assistance Needed Set-up / clean-up   Oral Hygiene CARE Score 5   Grooming   Grooming (FIM) 5 - Adah sets up supplies or applies device   QI: Shower/Bathe Self   Assistance Needed Physical assistance   Assistance Provided by Adah Less than 25%   Shower/Bathe Self CARE Score 3   Bathing   Assessed Bath Style Sponge Bath   Anticipated D/C Bath Style Shower   Able to [place] Satish No   Able to [employer] Temperature No   Able to Wash/Rinse/Dry (body part) Left Arm;Right Arm;L Upper Leg;R Upper Leg;Chest;Abdomen;Perineal Area; Buttocks   Bathing (FIM) 4 - Patient completes 8/10 or 9/10 parts   QI: Upper Body Dressing   Assistance Needed Physical assistance   Assistance Provided by Adah Less than 25%   Upper Body Dressing CARE Score 3   QI: Lower Body Dressing   Assistance Needed Physical assistance   Assistance Provided by [employer] Less than 25%   Lower Body Dressing CARE Score 3   QI: Putting On/Taking Off Footwear   Assistance Needed Physical assistance   Assistance Provided by [employer] 25%-49%   Putting On/Taking Off Footwear CARE Score 3   QI: Picking Up Object   Assistance Needed Physical assistance   Assistance Provided by [employer] 75% or more   Picking Up Object CARE Score 2   Dressing/Undressing Clothing   Remove UB Clothes Pullover Shirt   Remove LB Clothes Pants;Socks   [address] LB Clothes Pants;Socks   Limitations Noted In Balance; Endurance; Safety   UB Dressing (FIM) 4 - Patient completes 75% of all tasks   LB Dressing (FIM) 3 - Patient completes  50-74% of all tasks   QI: [address] Needed Physical assistance   Assistance Provided by [employer] Less than 25%   Toileting Hygiene CARE Score 3   Transfer Bed/Chair/Wheelchair   Sit to Stand Minimal   Supine to Sit Minimal   Bed, Chair, Wheelchair Transfer (FIM) 4 - Patient completes 75% of all tasks   QI: Toilet Transfer   Assistance Needed Physical assistance   Assistance Provided by [employer] Less than 25%   Toilet Transfer CARE Score 3   Toilet Transfer   Toilet Transfer (FIM) 0 - Activity does not occur   Tub/Shower Transfer   Shower Transfer (FIM) 0 - Activity does not occur   Social Interaction   Cooperation with staff   Participation Individual   Social Interaction (FIM) 5 - Needs monitoring/encouragement  to participate/interact   Problem Solving   Problem solving (FIM) 5 - Solves basic problems 90% of time   Memory   Memory (FIM) 5 - Recalls/performs request 90% of time   RUE Assessment   RUE Assessment WFL   LUE Assessment   LUE Assessment WFL   Coordination   Movements are Fluid and Coordinated 1   Sensation   Light Touch No apparent deficits   Sharp/Dull Not tested   Stereognosis No apparent deficits   Propioception No apparent deficits   Cognition   Overall Cognitive Status [facility] Attention Within functional limits   Orientation Level Oriented X4  (forgetful)   Vision   Vision Comments [facility])   Discharge Information   Patient's Rehab Expectations ("Get well")   Barriers to Discharge Home Decreased Endurance; Safety Considerations   Impressions Pt presents following hospitalization secondary to CHF with cardiac history causing debility  Patient requires assist secondary to decreased safety, balance and endurance and will benefit from skilled OT services to increase independence with daily tasks     OT Therapy Minutes   OT Time In 0700   OT Time Out 0800   OT Total Time (minutes) 60   OT Mode of treatment - Individual (minutes) (60)

## 2018-06-26 NOTE — NURSING NOTE
Speech therapy requested my help in with pt  Pt was c/o feeling short of breath  Pulse ox 86-88%  Placed on 2L n c had pt take slow deep breaths pulse ox came up to 94%   Will continue to monitor

## 2018-06-26 NOTE — PCC OCCUPATIONAL THERAPY
6/26/18: Patient evaluated this day  Requires assist secondary to decreased balance, safety and endurance  Patient's current level of function as folows: I eating, s/u grooming, min A UB dressing, toileting and toileting transfer, and mod A LB dressing  Pt agreeable to skilled OT services and will benefit to increase independence with daily tasks  7/3/18: Patient participates in ADLs, transfers/ mobility with management of O2 line and BUE therex  Pt is making good progress and demonstrates increased independence with daily tasks to prep for discharge to home with family support  Pt requires supervision secondary to decreased safety, endurance and balance  Recommend a tub chair for home and pt declines purchase of a commode because able to walk to bathroom  O2 in place  Pt will benefit form continued skilled OT services to increase independence with daily tasks and prep for transition to home

## 2018-06-26 NOTE — PROGRESS NOTES
Speech Swallow Evaluation       06/26/18 1215   Patient Preference   Nickname (Patient Preference) Bonnie Olivares   Restrictions/Precautions   Precautions Aspiration   QI: Eating   Assistance Needed Verbal cues   Comment rate control   Eating CARE Score 4   Eating Assessment   Swallow Precautions Yes   Bedside Swallow Results Yes   Food To Mouth Yes   Able To Cut Yes   Positioning Out of Bed   Safety Needs Increase Time;Cues; Chin Tuck   Meal Assessed Lunch   Current Diet Dysphia II; Thin   Intake Mode PO   Dentures Partial   Finishes Timely Other   Eating (FIM) 6 - Patient requires modified diet   QI: Oral Hygiene   Comment (verbal cues for rate control)   Comprehension   Auditory Basic   Visual Complex   QI: Comprehension 3  Usually Understands: Understands most conversations, but misses some part/intent of message  Requires cues at times to understand  Comprehension (FIM) 5 - Needs slowed speech to understand   Expression   Verbal Complex   Intelligibility Other   QI: Expression 3   Exhibits some difficulty with expressing needs and ideas (e g , some words or finishing thoughts) or speech is not clear   Expression (FIM) 5 - Needs help/cues only RARELY (< 10% of the time)   Problem Solving   Findings (see attached progress note for reasoning and problem solving)   Problem solving (FIM) 4 - Solves basic problems 75-89% of time   Memory   Recognize People Yes   Initiates Tasks Yes   Recalls Precaution No   Findings (refer to progress note)   Memory (FIM) 4 - Recognizes/recalls/performs 75-89%   Cognition   Overall Cognitive Status Impaired   Attention Attends with cues to redirect   Orientation Level Oriented X4   Memory Decreased recall of recent events   Following Commands Follows multistep commands with increased time or repetition   Comments (refer to progress note)   SLP Therapy Minutes   SLP Time In 1215   SLP Time Out 1315   SLP Total Time (minutes) 60   SLP Mode of treatment - Individual (minutes) 60       Patient seen 1:1 for initial assessment and presents with moderate oral pharyngeal dysphagia compounded by cognitive deficits  Patient displays oral phase weakness, reduced rear of tongue control and premature leakage with liquids, more prominent with warm liquids  In addition he has poor control with straw sips  Significant cough with thin and patient unable to successfully breakdown regular solids  He spent 10 minutes attempting to chew a very small piece of soft meat and asking to "wash it down"  SLP recommends diet texture downgrade to ground minced with gravy and nectar thick liquids  This texture was assessed as successful this evaluative session  Patient requires cues for rate control to ensure he has cleared the oral cavity prior to reintroduction of bolus  In addition, he needs cues for small single sips with thin liquids  SLP recommends downgrade as thin liquids are to be targeted therapeutically with SLP only initially in the therapeutic setting  Video Swallow is recommended secondary to c/o difficulty breathing and the feeling that his own saliva is "not going down" to rule out silent aspiration  Cognitively patient presents as intact with immediate memory; however, he is forgetful with recent and remote information  He forgets how to use controls previously instructed or precautions which may affect his safety  Patient's cognition is more affected later in the day and during periods of fatigue, ranging from supervision to mod assist later in the day

## 2018-06-26 NOTE — PCC NURSING
Patient is alert and oriented forgetful at times  Lungs clear but diminished bilateral bases on 2 liters of oxygen via nasal cannula  Patient gets dyspnic on exertion at times  Hr regular pacemaker in place  +1 lower extremity ankle edema  Pt bilateral buttocks and bilateral heels pink/blancheable  Pt has waffle mattress in place as a preventative measure  Pt has scab on R great toe which was causing pain prior to admission  Continent of bowel and bladder  Pt is assist of one and is able to ambulate to the bathroom with supervision  Patients daily weights are being monitored and trending up  Patients Lasix order changed to IV route  Patient for lab work and repeat chest x-ray this morning  Patient requires supervision to maintain patient safety  Patient continues to participate in therapy to improve functionality to return to highest level of function

## 2018-06-26 NOTE — PROGRESS NOTES
Progress Note - Yasmany Jackman 1934, 80 y o  male MRN: [id number]    Unit/Bed#: [facility] 217-02 Encounter: [id number]    Primary Care Provider: Cristino Cantrell MD   Date and time admitted to hospital: 6/25/2018  3:57 PM        * [address] [facility]    PT/OT/ST  Hospitalist consult        Malnutrition [facility])   Assessment & Plan    Megace        COPD (chronic obstructive pulmonary disease) ([address] )   Assessment & Plan    O2            Vitals:  Temp:  [95 °F (35 °C)-95 8 °F (35 4 °C)] 95 °F (35 °C)  HR:  [52-70] 64  Resp:  [18] 18  BP: ()/(54-62) 92/54    Physical Exam:  General: alert, no apparent distress, cooperative and comfortable  HENMT: Head: Normal, normocephalic, atraumatic  Eye: Normal external eye, conjunctiva, lids   Ears: Normal external ears  Nose: Normal external nose, mucus membranes  Pulmonary: chest expansion normal, no retractions, no accessory muscle usage, no wheezes, rales, or rhonchi   Abdomen: soft, nontender, nondistended, no masses or organomegaly  Skin/Extremity: no rashes, no erythema, no peripheral edema   Neurologic: Awake alert orientedx3  Psych: normal mood, behavior, speech, dress, and thought processes  Musculoskeletal : severe generalized muscle atrophy  Patient is progressing with ADLs and functional mobility, cognition & speech  Laboratory:    Lab Results   Component Value Date    HGB 13 4 (L) 06/26/2018    HGB 14 1 06/07/2018    HCT 40 0 06/26/2018    HCT 41 6 (L) 06/07/2018    WBC 8 00 06/26/2018    WBC 7 3 06/07/2018     Lab Results   Component Value Date    BUN 53 (H) 06/26/2018    BUN 38 (H) 06/07/2018     (H) 06/26/2018     06/07/2018    K 3 1 (L) 06/26/2018    K 3 7 06/07/2018     06/26/2018    CL 96 (L) 06/07/2018    GLUCOSE 82 06/26/2018    CREATININE 1 88 (H) 06/26/2018    CREATININE 2 10 (H) 06/07/2018     Lab Results   Component Value Date    INR 1 20 04/19/2018          Team conference held 35 min   Greater than 50% of time spent coordinating/ counseling patient care

## 2018-06-26 NOTE — PROGRESS NOTES
PT EVAL:   06/26/18 0930   Patient Data   Rehab Impairment (debility)   Home Setup   Type of Home Multi Level   Method of Entry Stairs;Hand Rail Bilateral   Number of Stairs 2   Number of Stairs in Home (bi level; 6 up 7 down; bilateral hand rails)   In Home Hand Rail Bilateral   Available Equipment Roller Walker   Pain Assessment   Pain Assessment 0-10   Pain Score No Pain   QI: Roll Left and Right   Assistance Needed Supervision   Roll Left and Right CARE Score 4   Bed Mobility   Able to Roll Left to Right;Right to Left;Scoot Up;Scoot Down   Findings (supervision)   QI: Sit to Lying   Assistance Needed Supervision   Sit to Lying CARE Score 4   QI: Lying to Sitting on Side of Bed   Assistance Needed Supervision   Lying to Sitting on Side of Bed CARE Score 4   QI: Sit to Stand   Assistance Needed Incidental touching   Comment (contact guard)   Sit to Stand CARE Score 4   QI: Chair/Bed-to-Chair Transfer   Assistance Needed Incidental touching   Comment contact guard   Chair/Bed-to-Chair Transfer CARE Score 4   QI: Car Transfer   Reason if not Attempted Safety concerns   Car Transfer CARE Score 88   Transfer Bed/Chair/Wheelchair   Positioning Concerns Skin Integrity   Limitations Noted In Balance; Endurance   Adaptive Equipment Roller Walker   Sit Pivot Contact Guard   Stand Pivot Contact Guard   Sit to Stand Assist x 1  (contact guard)   Stand to Sit Assist x 1  (contact guard)   Supine to Sit Supervision   Sit to Supine Supervision   Bed, Chair, Wheelchair Transfer (FIM) 4 - Patient requires steadying assist or light touching   QI: Walk 10 Feet   Assistance Needed Incidental touching   Comment CG with RW   Walk 10 Feet CARE Score 4   QI: Walk 50 Feet with Two Turns   Assistance Needed Incidental touching   Comment (CG with RW)   Walk 50 Feet with Two Turns CARE Score 4   QI: Walk 150 Feet   Reason if not Attempted Safety concerns   Walk 150 Feet CARE Score 88   QI: Walking 10 Feet on Uneven Surfaces   Assistance Needed Incidental touching   Comment CG carpet and ramp with RW   Walking 10 Feet on Uneven Surfaces CARE Score 4   Ambulation   Does the patient walk? 2  Yes   Primary Discharge Mode of Locomotion Walk   Walk Assist Level Contact Guard   Gait Pattern Step through   Assist Device Roller Walker   Distance Walked (feet) (120 times 2; 63)   Limitations Noted In Balance;Device Management; Endurance; Safety   QI: Wheel 50 Feet with Two Turns   Reason if not Attempted Activity not applicable   Wheel 50 Feet with Two Turns CARE Score 9   QI: Wheel 150 Feet   Reason if not Attempted Activity not applicable   Wheel 948 Feet CARE Score 9   Wheelchair mobility   QI: Does the patient use a wheelchair? 0  No   Wheelchair (FIM) 0 - Activity does not occur   QI: 1 Step (Curb)   Reason if not Attempted Activity not applicable   1 Step (Curb) CARE Score 9   QI: 4 Steps   Assistance Needed Incidental touching   Comment CG 5 steps 2 handrails   4 Steps CARE Score 4   QI: 12 Steps   Reason if not Attempted Safety concerns   12 Steps CARE Score 88   Stairs   Type Stairs; Ramp   # of Steps (5 steps)   Assist Devices (bilateral handrails on steps; RW on ramp)   Stairs (FIM) 2 - Patient goes up and down 4 - 11 stairs regardless of assist/device/setup   Comprehension   Comprehension (FIM) 5 - Needs help/cues, repetition only RARELY ( < 10% of the time)   Expression   Expression (FIM) 5 - Needs help/cues only RARELY (< 10% of the time)   Social Interaction   Social Interaction (FIM) 5 - Interacts appropriately with others 90% of time   Problem Solving   Problem solving (FIM) 5 - Solves basic problems 90% of time   Memory   Memory (FIM) 5 - Recalls/performs request 90% of time   RLE Assessment   RLE Assessment (WFL ROM; grossly 3+/5 strength)   LLE Assessment   LLE Assessment (WFL ROM; 3/5 grossly strength)   Sensation   Light Touch No apparent deficits   Sharp/Dull Not tested   Propioception No apparent deficits   Cognition   Overall Cognitive Status WFL   Attention Within functional limits   Orientation Level Oriented X4   Discharge Information   Impressions Patient presents with decreased ROM/strength, decreased balance and safety affecting functional mobility    Would benefit from inpatient PT to increase function, safety and independence with RW   PT Therapy Minutes   PT Time In 0930   PT Time Out 1030   PT Total Time (minutes) 60   PT Mode of treatment - Individual (minutes) 60   PT Mode of treatment - Concurrent (minutes) 0   PT Mode of treatment - Group (minutes) 0   PT Mode of treatment - Co-treat (minutes) 0   PT Mode of Teatment - Total time(minutes) 60 minutes

## 2018-06-26 NOTE — CASE MANAGEMENT
Assessment & orientation to [facility]  Spoke with Pt's significant other's daughter per his request  As of this time, Pt's significant other has moved into her daughter's residence & she is unsure if she will return to the home with Pt due to conflict between them  SW will assess & assist as needed  [facility] DC date 7/5; reviewed tx team recommendations with Pt & SO's daughter

## 2018-06-27 PROBLEM — E44.0 MODERATE PROTEIN-CALORIE MALNUTRITION (HCC): Status: ACTIVE | Noted: 2018-06-23

## 2018-06-27 LAB
ATRIAL RATE: 76 BPM
P AXIS: 60 DEGREES
PR INTERVAL: 172 MS
QRS AXIS: -46 DEGREES
QRSD INTERVAL: 144 MS
QT INTERVAL: 434 MS
QTC INTERVAL: 488 MS
T WAVE AXIS: 112 DEGREES
VENTRICULAR RATE: 76 BPM

## 2018-06-27 PROCEDURE — 93010 ELECTROCARDIOGRAM REPORT: CPT | Performed by: PHYSICIAN ASSISTANT

## 2018-06-27 PROCEDURE — 99232 SBSQ HOSP IP/OBS MODERATE 35: CPT | Performed by: PHYSICIAN ASSISTANT

## 2018-06-27 PROCEDURE — 97530 THERAPEUTIC ACTIVITIES: CPT

## 2018-06-27 PROCEDURE — G0515 COGNITIVE SKILLS DEVELOPMENT: HCPCS

## 2018-06-27 PROCEDURE — 97537 COMMUNITY/WORK REINTEGRATION: CPT

## 2018-06-27 PROCEDURE — 97110 THERAPEUTIC EXERCISES: CPT

## 2018-06-27 PROCEDURE — 97116 GAIT TRAINING THERAPY: CPT

## 2018-06-27 PROCEDURE — 92526 ORAL FUNCTION THERAPY: CPT

## 2018-06-27 RX ADMIN — HYDRALAZINE HYDROCHLORIDE 25 MG: 25 TABLET ORAL at 17:11

## 2018-06-27 RX ADMIN — MEGESTROL ACETATE 20 MG: 20 TABLET ORAL at 17:16

## 2018-06-27 RX ADMIN — HEPARIN SODIUM 5000 UNITS: 5000 INJECTION INTRAVENOUS; SUBCUTANEOUS at 09:02

## 2018-06-27 RX ADMIN — MEGESTROL ACETATE 20 MG: 20 TABLET ORAL at 21:03

## 2018-06-27 RX ADMIN — FAMOTIDINE 20 MG: 20 TABLET, FILM COATED ORAL at 09:02

## 2018-06-27 RX ADMIN — CARVEDILOL 3.12 MG: 3.12 TABLET, FILM COATED ORAL at 17:12

## 2018-06-27 RX ADMIN — ASPIRIN 81 MG 81 MG: 81 TABLET ORAL at 09:02

## 2018-06-27 RX ADMIN — HEPARIN SODIUM 5000 UNITS: 5000 INJECTION INTRAVENOUS; SUBCUTANEOUS at 21:02

## 2018-06-27 NOTE — PLAN OF CARE
DISCHARGE PLANNING     Discharge to home or other facility with appropriate resources Progressing        INFECTION - ADULT     Absence or prevention of progression during hospitalization Progressing     Absence of fever/infection during neutropenic period Progressing        Nutrition/Hydration-ADULT     Nutrient/Hydration intake appropriate for improving, restoring or maintaining nutritional needs Progressing        PAIN - ADULT     Verbalizes/displays adequate comfort level or baseline comfort level Progressing        Potential for Falls     Patient will remain free of falls Progressing        Prexisting or High Potential for Compromised Skin Integrity     Skin integrity is maintained or improved Progressing        SAFETY ADULT     Maintain or return to baseline ADL function Progressing     Maintain or return mobility status to optimal level Progressing

## 2018-06-27 NOTE — PROGRESS NOTES
06/27/18 1430   Pain Assessment   Pain Assessment No/denies pain   Pain Score No Pain   Restrictions/Precautions   Weight Bearing Restrictions No   ROM Restrictions No   Ambulation   Primary Discharge Mode of Locomotion Walk   Walk Assist Level Contact Guard   Assist Device Roller Walker   Distance Walked (feet) 120 ft  (100)   Limitations Noted In Balance; Endurance; Safety   Walking (FIM) 2 - Patient ambulates between 50 - 149 feet regardless of assist/device/set up   Stairs   Type Stairs; Ramp   # of Steps 5   Assist Devices Bilateral Rail   Stairs (FIM) 2 - Patient goes up and down 4 - 11 stairs regardless of assist/device/setup   Plan   Treatment/Interventions Functional transfer training;Elevations;Gait training   PT Therapy Minutes   PT Time In 1430   PT Time Out 1500   PT Total Time (minutes) 30   PT Mode of treatment - Individual (minutes) 30   PT Mode of treatment - Concurrent (minutes) 0   PT Mode of treatment - Group (minutes) 0   PT Mode of treatment - Co-treat (minutes) 0   PT Mode of Teatment - Total time(minutes) 30 minutes   Therapy Time missed   Time missed?  No

## 2018-06-27 NOTE — PROGRESS NOTES
06/27/18 1208   Restrictions/Precautions   Precautions Aspiration   Memory Skills   Orientation Level Oriented X4   Speech/Swallow Mechanism Exam   Labial Strength Reduced   Lingual Strength Mild reduced   Mandible Impaired   Dentition Nearly edentulous;Partial dentures   Volitional Cough Weak   Vocal Quality breathy   Volitional Swallow Delayed   Swallow Information   Current Risks for Dysphagia & Aspiration Weak cough;Weak voicing;Positionong Issues; Neck hyperextension   Current Symptoms/Concerns Cough; With liquids;During meals; Difficulty chewing;Holding food in mouth   Current Diet Dysphagia mechanical soft; Nectar thick liquid   Baseline Diet Regular   Consistencies Assessed and Performance   Oral Stage Moderate impaired   Phargngeal Stage Mild impaired; Moderate impaired   Recommendations   Risk for Aspiration Moderate   Diet Solid Recommendation Level 2 Dysphagia/ mechanical soft/altered   Diet Liquid Recommendation Nectar thick liquid   QI: Eating   Assistance Needed Set-up / Normajean Crumb; Verbal cues   Assistance Provided by [place] No physical assistance   Comment cues for rate control and decreased speaking   Eating CARE Score 4   Swallow Assessment   Swallow Treatment Assessment Speech Pathology Daily Treatment Note   Swallow Assessment Prognosis   Prognosis Good   Prognosis Considerations Participation level;Previous level of function;New learning ability   SLP Therapy Minutes   SLP Time In 1208   SLP Time Out 1258   SLP Total Time (minutes) 50   SLP Mode of treatment - Individual (minutes) 50   Daily FIM Score   Problem solving (FIM) 4 - Solves basic problems 75-89% of time

## 2018-06-27 NOTE — CONSULTS
Acute on chronic systolic CHF (congestive heart failure) ([facility])   Assessment & Plan    Continue lasix, Coreg, and isosorbide        Moderate protein-calorie malnutrition (HCC)   Assessment & Plan    Malnutrition Findings:      Degree of Malnutrition: Malnutrition of moderate degree    BMI Findings:  BMI Classifications: Underweight < 18 5     Body mass index is 18 25 kg/m²  Continue Megace           Atrial fibrillation (HCC) Paroxysmal   Assessment & Plan    Continue carvedilol and continue aspirin        * Debility   Assessment & Plan    Continue PT OT rehab services per Dr Dillan Soto            Acute on chronic systolic CHF (congestive heart failure) ([facility])   Assessment & Plan    Continue lasix, Coreg, and isosorbide        Moderate protein-calorie malnutrition (HCC)   Assessment & Plan    Malnutrition Findings:      Degree of Malnutrition: Malnutrition of moderate degree    BMI Findings:  BMI Classifications: Underweight < 18 5     Body mass index is 18 25 kg/m²  Continue Megace                        * Debility   Assessment & Plan    Continue PT OT rehab services per Dr Katelyn Luis / Coordination of Care Time: 30 minutes  Greater than 50% of total time spent on patient counseling and coordination of care  Thank you for allowing us to participate in the care your patient, will follow along as needed please contact if there is any issues      History of Present Illness:    Michael Lyons is a 80 y o  male who is originally admitted to the rehab service service due to debility with CHF and deconditioning  We are consulted for recent CHF admission with weakness  Patient was seen getting cleaned up after dinner  He reports that he is having some trouble swallowing and is on a special diet  And in the records looks like is having a video barium swallow scheduled    Review of Systems:    Review of Systems   Constitutional: Negative for chills and diaphoresis     HENT: Negative for mouth sores, sneezing and sore throat  Eyes: Negative  Respiratory: Negative for shortness of breath, wheezing and stridor  Cardiovascular: Negative for chest pain, palpitations and leg swelling  Gastrointestinal: Negative  Endocrine: Negative  Genitourinary: Negative  Musculoskeletal: Negative for back pain, neck pain and neck stiffness  Skin: Negative for color change and rash  Past Medical and Surgical History:     Past Medical History:   Diagnosis Date    AAA (abdominal aortic aneurysm) ([facility] [address] )     Arthritis     CAD (coronary artery disease)     CHF (congestive heart failure) ([facility]) 06/19/2018    CKD (chronic kidney disease), stage III     COPD (chronic obstructive pulmonary disease) ([facility])     Hypertension     Hypertensive nephrosclerosis     Ischemic cardiomyopathy     Pleural effusion due to CHF (congestive heart failure) ([facility])     Pulmonary HTN ([facility])        Past Surgical History:   Procedure Laterality Date    CARDIAC PACEMAKER PLACEMENT         Meds/Allergies:    all medications and allergies reviewed    Allergies: No Known Allergies    Social History:     Marital Status: Single    Substance Use History:   History   Alcohol Use    0 6 oz/week    1 Glasses of wine per week     Comment: daily with dinner     History   Smoking Status    Former Smoker    Packs/day: 0 00    Types: Cigarettes    Quit date: 1/1/2016   Smokeless Tobacco    Never Used     History   Drug Use No       Family History:    non-contributory    Physical Exam:     Vitals:   Blood Pressure: 106/50 (06/27/18 0901)  Pulse: 64 (06/27/18 0901)  Temperature: (!) 96 3 °F (35 7 °C) (06/27/18 0857)  Temp Source: Tympanic (06/27/18 0857)  Respirations: 16 (06/27/18 0738)  Height: 5' 6" (167 6 cm) (06/26/18 7338)  Weight - Scale: 51 3 kg (113 lb 1 5 oz) (06/26/18 0916)  SpO2: 93 % (06/27/18 0857)    Physical Exam   Constitutional: He is oriented to person, place, and time  No distress     HENT:   Head: Normocephalic and atraumatic  Eyes: Conjunctivae and EOM are normal    Neck: Normal range of motion  No tracheal deviation present  No thyromegaly present  Cardiovascular: Normal rate, regular rhythm and normal heart sounds  Exam reveals no gallop and no friction rub  No murmur heard  Pulmonary/Chest: Effort normal and breath sounds normal  No respiratory distress  He has no wheezes  He has no rales  He exhibits no tenderness  Abdominal: Soft  Bowel sounds are normal  He exhibits no distension and no mass  There is no tenderness  There is no rebound and no guarding  Musculoskeletal: Normal range of motion  Neurological: He is alert and oriented to person, place, and time  Skin: Skin is warm and dry  He is not diaphoretic  Psychiatric: He has a normal mood and affect  His behavior is normal  Judgment and thought content normal          Additional Data:     Lab Results: I have personally reviewed pertinent reports  Results from last 7 days  Lab Units 06/26/18  0516   WBC Thousand/uL 8 00   HEMOGLOBIN g/dL 13 4*   HEMATOCRIT % 40 0   PLATELETS Thousands/uL 104*   NEUTROS PCT % 82*   LYMPHS PCT % 6*   MONOS PCT % 8   EOS PCT % 4       Results from last 7 days  Lab Units 06/26/18  0515   SODIUM mmol/L 144*   POTASSIUM mmol/L 3 1*   CHLORIDE mmol/L 103   CO2 mmol/L 31   BUN mg/dL 53*   CREATININE mg/dL 1 88*   CALCIUM mg/dL 8 6   GLUCOSE RANDOM mg/dL 82             No results found for: HGBA1C      XR chest portable   Final Result by Ines Grewal (06/26 1939)   There is only minimal improvement in the pulmonary edema or congestive   failure pattern from the 24th  Bilateral pleural effusions remain              Signed by Flower Martínez MD      XR toe right second min 2 views   Final Result by Del Poe (06/25 1955)      FL barium swallow video w speech    (Results Pending)       ** Please Note: This note has been constructed using a voice recognition system   **

## 2018-06-27 NOTE — PROGRESS NOTES
Progress Note - Susan Jane 1934, 80 y o  male MRN: [id number]    Unit/Bed#: [facility]-02 Encounter: [id number]    Primary Care Provider: Kevin Guzman MD   Date and time admitted to hospital: 6/25/2018  3:57 PM        * [facility]    PT/OT/ST  Hospitalist consult        Malnutrition [facility])   Assessment & Plan    Megace        COPD (chronic obstructive pulmonary disease) ([facility] [address] )   Assessment & Plan    O2          Vitals:  Temp:  [94 3 °F (34 6 °C)-96 3 °F (35 7 °C)] 96 3 °F (35 7 °C)  HR:  [60-83] 64  Resp:  [16] 16  BP: ()/(50-68) 106/50    Physical Exam:  General: alert, no apparent distress, cooperative and comfortable  HENMT: Head: Normal, normocephalic, atraumatic  Eye: Normal external eye, conjunctiva, lids   Ears: Normal external ears  Nose: Normal external nose, mucus membranes  Pulmonary: chest expansion normal, no retractions, no accessory muscle usage, no wheezes, rales, or rhonchi   Abdomen: soft, nontender, nondistended, no masses or organomegaly  Skin/Extremity: no rashes, no erythema, no peripheral edema    Neurologic: Awake alert orientedx3  Psych: normal mood, behavior, speech, dress, and thought processes  Musculoskeletal severe generalized muscle atrophy  Patient is progressing with ADLs and functional mobility, cognition & speech      Laboratory:    Lab Results   Component Value Date    HGB 13 4 (L) 06/26/2018    HGB 14 1 06/07/2018    HCT 40 0 06/26/2018    HCT 41 6 (L) 06/07/2018    WBC 8 00 06/26/2018    WBC 7 3 06/07/2018     Lab Results   Component Value Date    BUN 53 (H) 06/26/2018    BUN 38 (H) 06/07/2018     (H) 06/26/2018     06/07/2018    K 3 1 (L) 06/26/2018    K 3 7 06/07/2018     06/26/2018    CL 96 (L) 06/07/2018    GLUCOSE 82 06/26/2018    CREATININE 1 88 (H) 06/26/2018    CREATININE 2 10 (H) 06/07/2018     Lab Results   Component Value Date    INR 1 20 04/19/2018

## 2018-06-27 NOTE — PROGRESS NOTES
Pt awake resting in bed  Rang appropriately during shift, urinal use staff emptied  Pt has moist productive cough, pt expelled mucous into tissue before RN could access  Meds crushed with NTL tolerating  Video swallow to be done today as ordered  Assessment unchanged  Continuing to monitor

## 2018-06-27 NOTE — ASSESSMENT & PLAN NOTE
Malnutrition Findings:      Degree of Malnutrition: Malnutrition of moderate degree    BMI Findings:  BMI Classifications: Underweight < 18 5     Body mass index is 18 25 kg/m²       Continue Megace

## 2018-06-27 NOTE — PROGRESS NOTES
06/27/18 1300   Pain Assessment   Pain Assessment No/denies pain   Pain Score No Pain   Restrictions/Precautions   Weight Bearing Restrictions No   Cognition   Overall Cognitive Status Impaired   Arousal/Participation Alert; Responsive   Attention Within functional limits   Orientation Level Oriented X4   Memory Within functional limits   Following Commands Follows all commands and directions without difficulty   Transfer Bed/Chair/Wheelchair   Limitations Noted In Coordination; Endurance;LE Strength   Adaptive Equipment Roller Walker   Sit Pivot Contact Guard   Stand Pivot Contact Guard   Sit to [employer]   Stand to [facility]   Supine to [facility]   Sit to Supine Contact Guard   Bed, Chair, Wheelchair Transfer (FIM) 4 - Patient requires steadying assist or light touching   Therapeutic Interventions   Strengthening seated ther ex   Assessment   Treatment Assessment pt tolerated treatment well   PT Therapy Minutes   PT Time In 1300   PT Time Out 1330   PT Total Time (minutes) 30   PT Mode of treatment - Individual (minutes) 30   PT Mode of treatment - Concurrent (minutes) 0   PT Mode of treatment - Group (minutes) 0   PT Mode of treatment - Co-treat (minutes) 0   PT Mode of Teatment - Total time(minutes) 30 minutes   Therapy Time missed   Time missed?  No

## 2018-06-27 NOTE — PROGRESS NOTES
06/27/18 1330   Therapeutic Excerise-Strength   UE Strength (Functional reacher use S, FM card game and knotting)   Exercise Tools   Exercise Tools (2# dumbbell weights 2sets 10, yellow flexbar 20reps)   Coordination   Fine Motor (card game)   Assessment   Treatment Assessment Patient participates with BUE therex/ coordination with 30 minutes concurrent treatment during FM task of card game  Pt uses 2L/min O2 and complains of R shoulder pain reporting use of icy hot for chronic pain  Patient tolerates treatment well with activities modified as need to avoid RUE pain  Plan   Treatment/Interventions ADL retraining;LE strengthening/ROM; Therapeutic exercise   Progress Progressing toward goals   OT Therapy Minutes   OT Time In 1330   OT Time Out 1430   OT Total Time (minutes) 60   OT Mode of treatment - Individual (minutes) (30)   Therapy Time missed   Time missed?  No

## 2018-06-28 PROCEDURE — 92526 ORAL FUNCTION THERAPY: CPT

## 2018-06-28 PROCEDURE — 97530 THERAPEUTIC ACTIVITIES: CPT

## 2018-06-28 PROCEDURE — 97110 THERAPEUTIC EXERCISES: CPT

## 2018-06-28 PROCEDURE — 92507 TX SP LANG VOICE COMM INDIV: CPT

## 2018-06-28 PROCEDURE — 97116 GAIT TRAINING THERAPY: CPT

## 2018-06-28 PROCEDURE — G0515 COGNITIVE SKILLS DEVELOPMENT: HCPCS

## 2018-06-28 RX ADMIN — DOCUSATE SODIUM 100 MG: 100 CAPSULE, LIQUID FILLED ORAL at 17:29

## 2018-06-28 RX ADMIN — MEGESTROL ACETATE 20 MG: 20 TABLET ORAL at 12:15

## 2018-06-28 RX ADMIN — POLYETHYLENE GLYCOL 3350 17 G: 17 POWDER, FOR SOLUTION ORAL at 08:54

## 2018-06-28 RX ADMIN — CARVEDILOL 3.12 MG: 3.12 TABLET, FILM COATED ORAL at 08:53

## 2018-06-28 RX ADMIN — FAMOTIDINE 20 MG: 20 TABLET, FILM COATED ORAL at 08:52

## 2018-06-28 RX ADMIN — ASPIRIN 81 MG 81 MG: 81 TABLET ORAL at 08:52

## 2018-06-28 RX ADMIN — MEGESTROL ACETATE 20 MG: 20 TABLET ORAL at 08:58

## 2018-06-28 RX ADMIN — SENNOSIDES 17.2 MG: 8.6 TABLET, FILM COATED ORAL at 08:52

## 2018-06-28 RX ADMIN — DOCUSATE SODIUM 100 MG: 100 CAPSULE, LIQUID FILLED ORAL at 08:52

## 2018-06-28 RX ADMIN — MEGESTROL ACETATE 20 MG: 20 TABLET ORAL at 17:30

## 2018-06-28 RX ADMIN — HEPARIN SODIUM 5000 UNITS: 5000 INJECTION INTRAVENOUS; SUBCUTANEOUS at 08:54

## 2018-06-28 RX ADMIN — HEPARIN SODIUM 5000 UNITS: 5000 INJECTION INTRAVENOUS; SUBCUTANEOUS at 21:47

## 2018-06-28 RX ADMIN — CARVEDILOL 3.12 MG: 3.12 TABLET, FILM COATED ORAL at 17:29

## 2018-06-28 NOTE — PROGRESS NOTES
PT PROGRESS NOTE:     06/28/18 0652   Pain Assessment   Pain Assessment No/denies pain   Pain Score No Pain   Restrictions/Precautions   Precautions O2   Cognition   Arousal/Participation (increased fatigue but participating in therapy)   Orientation Level Oriented to person;Oriented to place;Oriented to time;Oriented to situation   Transfer Bed/Chair/Wheelchair   Limitations Noted In Balance; Coordination; Endurance;LE Strength   Adaptive Equipment Roller Walker   Sit to [employer]   Stand to [facility] Danny, Chair, Wheelchair Transfer (FIM) 4 - Patient requires steadying assist or light touching   Ambulation   Primary Discharge Mode of Locomotion Walk   Walk Assist Level Contact Guard   Gait Pattern Slow Paloma   Assist Device Roller Walker   Distance Walked (feet) (23' tmes 2)   Limitations Noted In Balance; Endurance; Safety   Findings patient extremely fatigued; only able to walk short distances   Walking (FIM) 1 - Patient ambulates less than 49 feet regardless of assist/device/set up   Toilet Transfer   Surface Assessed Bedside Commode   Limitations Noted In Balance; Endurance; Safety   Adaptive Equipment Grab Bar   Toilet Transfer (FIM) 4 - Patient requires steadying assist or light touching   Therapeutic Interventions   Strengthening seated ther ex bilateral LE for   Balance gait and transfer training with RW   Assessment   Treatment Assessment Patient extremely fatigued this AM;  completed therapy but required increased rest breaks and only able to walk short distances  Nursing aware  PT Barriers   Physical Impairment Decreased strength;Decreased range of motion;Decreased endurance; Impaired balance;Decreased safety awareness   Plan   Treatment/Interventions Functional transfer training;LE strengthening/ROM; Gait training   Recommendation   Equipment Recommended Walker   PT Therapy Minutes   PT Time In 8722   PT Time Out 0752   PT Total Time (minutes) 60   PT Mode of treatment - Individual (minutes) 60   PT Mode of treatment - Concurrent (minutes) 0   PT Mode of treatment - Group (minutes) 0   PT Mode of treatment - Co-treat (minutes) 0   PT Mode of Teatment - Total time(minutes) 60 minutes   Therapy Time missed   Time missed?  No

## 2018-06-28 NOTE — PROGRESS NOTES
Patient out of bed with assist times one with walker  Patient states he is weak today and head feels fuzzy at times  BP running low and BP meds held this shift  02 continues at 2 liters  Voices no complaints of pain  Diet downgrade to nectar thick liquids and patient doing well on current diet  Will continue to monitor and follow plan of care

## 2018-06-28 NOTE — PROGRESS NOTES
Progress Note - Linda Wei 1934, 80 y o  male MRN: [id number]    Unit/Bed#: [facility] 217-02 Encounter: [id number]    Primary Care Provider: Nicolasa Ro MD   Date and time admitted to hospital: 6/25/2018  3:57 PM        * [facility]    PT/OT/ST  Hospitalist consult        Moderate protein-calorie malnutrition ([facility] )   Assessment & Plan    Megace        COPD (chronic obstructive pulmonary disease) ([facility] [address] )   Assessment & Plan    O2            Vitals:  Temp:  [95 2 °F (35 1 °C)-96 °F (35 6 °C)] 95 2 °F (35 1 °C)  HR:  [65-77] 65  Resp:  [18-20] 18  BP: ()/(54-68) 100/54    Physical Exam:  General: alert, no apparent distress, cooperative and comfortable   HENMT: Head: Normal, normocephalic, atraumatic  Eye: Normal external eye, conjunctiva, lids   Ears: Normal external ears  Nose: Normal external nose, mucus membranes  Pulmonary: chest expansion normal, no retractions, no accessory muscle usage, no wheezes, rales, or rhonchi   Abdomen: soft, nontender, nondistended, no masses or organomegaly  Skin/Extremity: no rashes, no erythema, no peripheral edema    Neurologic: Awake alert orientedx3  Psych: normal mood, behavior, speech, dress, and thought processes  Musculoskeletal severe generalized muscle atrophy  Patient is progressing with ADLs and functional mobility, cognition & speech      Laboratory:    Lab Results   Component Value Date    HGB 13 4 (L) 06/26/2018    HGB 14 1 06/07/2018    HCT 40 0 06/26/2018    HCT 41 6 (L) 06/07/2018    WBC 8 00 06/26/2018    WBC 7 3 06/07/2018     Lab Results   Component Value Date    BUN 53 (H) 06/26/2018    BUN 38 (H) 06/07/2018     (H) 06/26/2018     06/07/2018    K 3 1 (L) 06/26/2018    K 3 7 06/07/2018     06/26/2018    CL 96 (L) 06/07/2018    GLUCOSE 82 06/26/2018    CREATININE 1 88 (H) 06/26/2018    CREATININE 2 10 (H) 06/07/2018     Lab Results   Component Value Date    INR 1 20 04/19/2018

## 2018-06-28 NOTE — PROGRESS NOTES
06/28/18 1435   Memory Skills   Memory (FIM) 5 - Loses track of time   Social Interaction (FIM) 5 - Requires redirection but less than 10% of the time  Speech/Swallow Mechanism Exam   Labial Strength Reduced   Lingual Strength Mild reduced   Mandible Impaired   Volitional Cough Weak   Respratory Status Nasal Cannula   Motor Speech Evaluation   Respiration/Phonation X   Vocal/ Volume Intensity Mildly decreased   Speech/Language/Cognition Assessmetn   Treatment Assessment Speech Pathology Daily Treatment Note   Swallow Information   Current Risks for Dysphagia & Aspiration Weak cough;Weak voicing;General debilitation;Cognitive deficit   Current Symptoms/Concerns Cough;Clear throat;During meals; Difficulty chewing   Current Diet Dysphagia mechanical soft; Nectar thick liquid   Baseline Diet Regular   Recommendations   Risk for Aspiration Mild   QI: Eating   Assistance Needed Set-up / clean-up;Supervision   Assistance Provided by [place] No physical assistance   Eating CARE Score 4   Swallow Assessment   Swallow Treatment Assessment Speech Pathology Daily Treatment Note   SLP Therapy Minutes   SLP Time In 0766   SLP Time Out 5391   SLP Total Time (minutes) 60   SLP Mode of treatment - Individual (minutes) 30   SLP Mode of treatment - Concurrent (minutes) 30   Daily FIM Score   Problem solving (FIM) 5 - Solves complex problems But requires cues from helper   Comprehension (FIM) 5 - Understands basic directions and conversation   Expression (FIM) 6 - Expresses complex/abstract but requires:  more time   Eating (FIM) 5 - Patient needs help to open contianers or set up tray

## 2018-06-28 NOTE — SPEECH THERAPY NOTE
Speech Pathology Daily Treatment Note    Patient seen for skilled speech services later in the day as he was requesting thin liquids  SLP reiterated his current deficits and diet texture recommendations  SLP provided NTL  Compensatory safe swallow education completed including 90 posture, small bites, small and single sips of liquids, decreased speaking on oral phase, oral clearance prior to reintroduction, rate control    Patient calmed and agreed to await results of swallow study which has been requested in order to the physician

## 2018-06-28 NOTE — PROGRESS NOTES
Pt sleeping in bed  Forgetful  Awoke during night, c/o can't breath pt self removed oxygen  Reapplied and stated felt better  Educated deep breathing exercises  Urinal use appropriate, staff empties  Assessment unchanged  Will continue to monitor, bed alarm on

## 2018-06-28 NOTE — PROGRESS NOTES
Session conducted by Toño Zuñiga     06/28/18 1300   Transfer Bed/Chair/Wheelchair   Sit to Stand Supervision   Sit to Supine Minimal   Functional Standing Tolerance   Time (5 mins)   Activity (card match)   Therapeutic Excerise-Strength   UE Strength Yes   Right Upper Extremity- Strength   R Elbow Elbow flexion;Elbow extension   R Wrist Wrist flexion;Wrist extension   R Hand Thumb; Index finger; Long finger;Ring finger;Little finger   Left Upper Extremity-Strength   L Elbow Elbow flexion;Elbow extension   L Wrist Wrist flexion;Wrist extension   L Hand Thumb; Index finger; Long finger;Ring finger;Little finger   Exercise Tools   Exercise Tools (1 lb weights and salmon theraputty)   Coordination   In Hand Manipulation (manipulating playing cards)   Activity Tolerance   Activity Tolerance Patient tolerated treatment well   Other Comments   Assessment Pt participated in therex/ theract by doing UE exs (30 reps), theraputty, standing tolerance, and playing a card game  Pt was put on thickened liquids and repeatedly asked for water or an ice cube so he could rinse out his mouth  Pt c/o pain in his right shoulder with simple motions but was able to handle a 1 lb weight  Pt was able to manipulate the cards as well as FM/ GM movements and cog abilities  Pt then wanted to go to bed and txfred back to bed to rest    Plan   Treatment/Interventions (video swallow study scheduled)   OT Therapy Minutes   OT Time In 1300   OT Time Out 1410   OT Total Time (minutes) 70   OT Mode of treatment - Individual (minutes) (40)   OT Mode of treatment - Concurrent (minutes) (30)   Therapy Time missed   Time missed?  No

## 2018-06-29 ENCOUNTER — APPOINTMENT (INPATIENT)
Dept: RADIOLOGY | Facility: HOSPITAL | Age: 83
DRG: 948 | End: 2018-06-29
Payer: MEDICARE

## 2018-06-29 DIAGNOSIS — I50.43 CHF (CONGESTIVE HEART FAILURE), NYHA CLASS I, ACUTE ON CHRONIC, COMBINED (HCC): Primary | ICD-10-CM

## 2018-06-29 PROBLEM — R13.10 DYSPHAGIA: Status: ACTIVE | Noted: 2018-06-29

## 2018-06-29 PROCEDURE — 97537 COMMUNITY/WORK REINTEGRATION: CPT

## 2018-06-29 PROCEDURE — 97535 SELF CARE MNGMENT TRAINING: CPT

## 2018-06-29 PROCEDURE — 97110 THERAPEUTIC EXERCISES: CPT

## 2018-06-29 PROCEDURE — 97530 THERAPEUTIC ACTIVITIES: CPT

## 2018-06-29 PROCEDURE — 97116 GAIT TRAINING THERAPY: CPT

## 2018-06-29 PROCEDURE — 92507 TX SP LANG VOICE COMM INDIV: CPT

## 2018-06-29 PROCEDURE — 92526 ORAL FUNCTION THERAPY: CPT

## 2018-06-29 PROCEDURE — G0515 COGNITIVE SKILLS DEVELOPMENT: HCPCS

## 2018-06-29 PROCEDURE — 92611 MOTION FLUOROSCOPY/SWALLOW: CPT

## 2018-06-29 PROCEDURE — 74230 X-RAY XM SWLNG FUNCJ C+: CPT

## 2018-06-29 RX ADMIN — MEGESTROL ACETATE 20 MG: 20 TABLET ORAL at 17:18

## 2018-06-29 RX ADMIN — ASPIRIN 81 MG 81 MG: 81 TABLET ORAL at 08:42

## 2018-06-29 RX ADMIN — CARVEDILOL 3.12 MG: 3.12 TABLET, FILM COATED ORAL at 17:18

## 2018-06-29 RX ADMIN — FAMOTIDINE 20 MG: 20 TABLET, FILM COATED ORAL at 08:42

## 2018-06-29 RX ADMIN — SENNOSIDES 17.2 MG: 8.6 TABLET, FILM COATED ORAL at 08:42

## 2018-06-29 RX ADMIN — CARVEDILOL 3.12 MG: 3.12 TABLET, FILM COATED ORAL at 08:41

## 2018-06-29 RX ADMIN — HEPARIN SODIUM 5000 UNITS: 5000 INJECTION INTRAVENOUS; SUBCUTANEOUS at 21:14

## 2018-06-29 RX ADMIN — DOCUSATE SODIUM 100 MG: 100 CAPSULE, LIQUID FILLED ORAL at 08:42

## 2018-06-29 RX ADMIN — MEGESTROL ACETATE 20 MG: 20 TABLET ORAL at 21:14

## 2018-06-29 RX ADMIN — MEGESTROL ACETATE 20 MG: 20 TABLET ORAL at 08:47

## 2018-06-29 RX ADMIN — HEPARIN SODIUM 5000 UNITS: 5000 INJECTION INTRAVENOUS; SUBCUTANEOUS at 08:42

## 2018-06-29 RX ADMIN — DOCUSATE SODIUM 100 MG: 100 CAPSULE, LIQUID FILLED ORAL at 17:18

## 2018-06-29 RX ADMIN — MEGESTROL ACETATE 20 MG: 20 TABLET ORAL at 13:58

## 2018-06-29 NOTE — PROGRESS NOTES
Patient out of bed with assist times one with walker  Continues with generalized weakness and dyspnea on exertion  02 at 2 liters continues  Voices no complaints of pain  Lasix and BP meds due to low Bp  Dr Kanu Fine aware of same  Will continue to monitor and follow plan of care

## 2018-06-29 NOTE — PROGRESS NOTES
Pt's OT session led by Esau Carlisle     06/29/18 0930   Grooming   Grooming (FIM) 5 - [place] sets up supplies or applies device   Bathing   Bathing (FIM) 4 - Patient completes 8/10 or 9/10 parts   Tub/Shower Transfer   Shower Transfer (FIM) 4 - Patient requires steadying assist or light touching   Dressing/Undressing Clothing   UB Dressing (FIM) 5 - [place] sets up supplies or applies device   LB Dressing (FIM) 4 - Patient completes 75% of all tasks   Transfer Bed/Chair/Wheelchair   Bed, Chair, Wheelchair Transfer (FIM) 4 - Patient requires steadying assist or light touching   Toilet Transfer   Toilet Transfer (FIM) 4 - Patient requires steadying assist or light touching   Other Comments   Assessment Pt participated in ADL/ theract by showering, dressing, grooming, and txfring to and from the shower  Pt was CGA and needed help washing his feet  Pt wore his oxygen in the shower on 2 L  Pt was very happy post shower and said it was his first shower in a month     OT Therapy Minutes   OT Time In 0930   OT Time Out 1030   OT Total Time (minutes) 60   OT Mode of treatment - Individual (minutes) (60)

## 2018-06-29 NOTE — PLAN OF CARE
Problem: Nutrition/Hydration-ADULT  Goal: Nutrient/Hydration intake appropriate for improving, restoring or maintaining nutritional needs  Monitor and assess patient's nutrition/hydration status for malnutrition (ex- brittle hair, bruises, dry skin, pale skin and conjunctiva, muscle wasting, smooth red tongue, and disorientation)  Collaborate with interdisciplinary team and initiate plan and interventions as ordered  Monitor patient's weight and dietary intake as ordered or per policy  Utilize nutrition screening tool and intervene per policy  Determine patient's food preferences and provide high-protein, high-caloric foods as appropriate  INTERVENTIONS:  - Monitor oral intake, urinary output, labs, and treatment plans  - Assess nutrition and hydration status and recommend course of action  - Evaluate amount of meals eaten  - Assist patient with eating if necessary   - Allow adequate time for meals  - Recommend/ encourage appropriate diets, oral nutritional supplements, and vitamin/mineral supplements  - Order, calculate, and assess calorie counts as needed  - Assess need for intravenous fluids  - Provide specific nutrition/hydration education as appropriate  - Include patient/family/caregiver in decisions related to nutrition   Outcome: Progressing  GOAL: Pt will have desirable 5lbs wt gain by disch  Pt with current 2lbs wt loss from initial assessment  Pt does have improved meal intakes % since dt and liquid textures were downgraded  Pt continues with Megace

## 2018-06-29 NOTE — PROGRESS NOTES
PT PROGRESS NOTE:     06/29/18 1330   Pain Assessment   Pain Assessment No/denies pain   Pain Score No Pain   Cognition   Orientation Level Oriented X4   Transfer Bed/Chair/Wheelchair   Limitations Noted In Balance; Endurance   Adaptive Equipment Roller Walker   Stand Pivot Contact Guard   Sit to [employer]   Stand to [employer] Danny, Chair, Wheelchair Transfer (FIM) 4 - Patient requires steadying assist or light touching   Ambulation   Primary Discharge Mode of Locomotion Walk   Walk Assist Level Contact Guard   Gait Pattern Slow Paloma   Assist Device Roller Walker   Distance Walked (feet) (36 64 100)   Limitations Noted In Balance; Endurance; Safety   Findings (also needs help with managing oxygen tubing)   Walking (FIM) 2 - Patient ambulates between 50 - 149 feet regardless of assist/device/set up   Stairs   Type Stairs; Ramp   # of Steps (5)   Assist Devices Bilateral Rail   Findings ramp with RW cg   Stairs (FIM) 2 - Patient goes up and down 4 - 11 stairs regardless of assist/device/setup   Therapeutic Interventions   Strengthening seated ther ex   Balance gait and transfer training   Assessment   Treatment Assessment Patient completed seated ther ex for increased ROM/strength; gait (level and unlevel) and transfer training with focus on technique for improved balance and safety with functional mobility using RW   PT Barriers   Physical Impairment Decreased strength;Decreased range of motion;Decreased endurance; Impaired balance   Functional Limitation Ramp negotiation;Stair negotiation;Standing;Transfers; Walking   Plan   Treatment/Interventions Functional transfer training;LE strengthening/ROM; Elevations; Therapeutic exercise; Bed mobility;Gait training   Progress Progressing toward goals   PT Therapy Minutes   PT Time In 1330   PT Time Out 1430   PT Total Time (minutes) 60   PT Mode of treatment - Individual (minutes) 60   PT Mode of treatment - Concurrent (minutes) 0   PT Mode of treatment - Group (minutes) 0   PT Mode of treatment - Co-treat (minutes) 0   PT Mode of Teatment - Total time(minutes) 60 minutes   Therapy Time missed   Time missed?  No

## 2018-06-29 NOTE — PROCEDURES
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 ([place] [address] )    CKD (chronic kidney disease), stage III    Atrial fibrillation ([facility] )    COPD (chronic obstructive pulmonary disease) ([facility] )    TIA (transient ischemic attack)    Pulmonary HTN ([facility] )    AAA (abdominal aortic aneurysm) ([place] [address] )    CAD (coronary artery disease)    Hypertensive nephrosclerosis    Anorexia    Moderate protein-calorie malnutrition (HCC)    Acute on chronic systolic CHF (congestive heart failure) ([facility] )    Debility    Dysphagia       Past Medical History  Past Medical History:   Diagnosis Date    AAA (abdominal aortic aneurysm) (HCC)     Arthritis     CAD (coronary artery disease)     CHF (congestive heart failure) ([place] [address] ) 06/19/2018    CKD (chronic kidney disease), stage III     COPD (chronic obstructive pulmonary disease) ([facility] )     Hypertension     Hypertensive nephrosclerosis     Ischemic cardiomyopathy     Pleural effusion due to CHF (congestive heart failure) (HCC)     Pulmonary HTN (HCC)        Past Surgical History  Past Surgical History:   Procedure Laterality Date    CARDIAC PACEMAKER PLACEMENT           General Information;  Pt is a 80 y o  male with a PMH remarkable for COPD, hx TIA, HTN, CHF and O2 dependence  He presented to [facility] from the acute rehab unit on 2 liters O2 via nasal cannula accompanied by SLP  Current concerns for dysphagia include coughing with PO intake, initially worse with thin liquids  [facility]BS was recommended to assess oropharyngeal stage swallowing skills at this time   Pt was viewed in lateral position and was given trials of pureed, soft moist (sliced banana, ice cream, apple sauce, bread) as well as nectar (mildly thick) and thin liquid  A mixed consistency item (fruit in liquid) liquid was also administered  Special studies:   CXR 6/22/2018 IMPRESSION:Bibasilar airspace opacities with small effusions and mild pulmonary  vascular congestion  CXR 6/26/2018 IMPRESSION: There is only minimal improvement in the pulmonary edema or congestive failure pattern from the 24th  Bilateral pleural effusions remain    Oral stage; Pt presented with moderate oral stage dysphagia  Mastication was prolonged with solids with frontal pattern chewing 2* missing most molars  Bolus formation and transfer prolonged/effortful with piecemeal deglutition  Premature spillage to the vallecula with weak base of tongue movement  Pt's oral cavity was noted to be excessively dry upon examination  Pharyngeal stage; Pt presented with mild-moderate pharyngeal dysphagia  Swallowing initiation was mildly delayed with reduced laryngeal elevation and weak pharyngeal constriction  AIrway closure/protection appeared complete  Tongue base retraction appeared to be reduced in strength  Mild vallecular retention notes with all consistencies  Mild-moderate diffuse pharyngeal retention present with small bite of bread  Management of food/liquid follows: All food and liquid passed through the pharynx with with no penetration or aspiration  However, oropharyngeal musculature appears weak with increased fatigue in mastication and multiple swallows per bolus  Strategies and Efficacy: Pt benefits from frequent liquid wash due to significantly dry mucosa  Slightly improved vallecular clearance with chin down posture  Esophageal stage;  Esophageal screening was completed  Mild backflow occasionally present after swallow, primarily with liquids  Burping also occurred x2 after swallow of liquid, followed by mild cough       Assessment Summary; Pt presents with mild-moderate oropharyngeal dysphagia characterized by reduced bolus formation and prolonged mastication and A-P transfer, premature spillage, oral retention with piecemeal deglutition, appearing delayed swallow with reduced elevation and pharyngeal constriction with mild-mod pharyngeal retention  Airway closure appears complete with no penetration/aspiration observed  Pt does experience mild backflow/burping with intake  Mild throat clearing/coughing present throughout, suspected to be caused by sensation of residual and backflow as no penetration/aspiration was seen  Recommendations: mechanically altered/level 2 diet and thin liquids     Recommended Form of Meds: whole with liquid and one at a time     Aspiration precautions and compensatory swallowing strategies: upright posture, slow rate of feeding, small bites/sips, chin tuck and alternating bites and sips    Results Reviewed with: patient and rehab SLP Bel Chaudhari     Treatment Recommended: Continue with ST for analysis of diet tolerance and training in mealtime management, reflux precautions, and safe PO intake strategies     Frequency of treatment: To be determined by ARU SLP    Patient Stated Goal: "I want water and ice chips"    Pt Education: initiated  Pt and caregivers would benefit from continued education

## 2018-06-29 NOTE — PROGRESS NOTES
Progress Note - Wilian Butterfield 1934, 80 y o  male MRN: [id number]    Unit/Bed#: Fabiana Lawson 217-02 Encounter: [id number]    Primary Care Provider: Lupe Yun MD   Date and time admitted to hospital: 6/25/2018  3:57 PM        * [facility]    PT/OT/ST  Hospitalist consult        Moderate protein-calorie malnutrition ([address] )   Assessment & Plan    Megace        COPD (chronic obstructive pulmonary disease) ([facility] [address] )   Assessment & Plan    O2        Dysphagia   Assessment & Plan    Dysphagia      ST  Modified diet  VFSS            Vitals:  Temp:  [95 9 °F (35 5 °C)-96 °F (35 6 °C)] 96 °F (35 6 °C)  HR:  [72-76] 73  Resp:  [16] 16  BP: (112-124)/(62-64) 112/62    Physical Exam:  General: alert, no apparent distress, cooperative and comfortable  HENMT: Head: Normal, normocephalic, atraumatic  Eye: Normal external eye, conjunctiva, lids   Ears: Normal external ears  Nose: Normal external nose, mucus membranes  Pulmonary: chest expansion normal, no retractions, no accessory muscle usage, no wheezes, rales, or rhonchi   Abdomen: soft, nontender, nondistended, no masses or organomegaly  Skin/Extremity: no rashes, no erythema, no peripheral edema    Neurologic: Awake alert orientedx3  Psych: normal mood, behavior, speech, dress, and thought processes  Musculoskeletal severe generalized muscle atrophy  Patient is progressing with ADLs and functional mobility, cognition & speech      Laboratory:    Lab Results   Component Value Date    HGB 13 4 (L) 06/26/2018    HGB 15 4 06/22/2018    HCT 40 0 06/26/2018    HCT 46 2 06/22/2018    WBC 8 00 06/26/2018    WBC 8 1 06/22/2018     Lab Results   Component Value Date    BUN 53 (H) 06/26/2018    BUN 53 (H) 06/22/2018     (H) 06/26/2018     06/22/2018    K 3 1 (L) 06/26/2018    K 4 3 06/22/2018     06/26/2018     06/22/2018    GLUCOSE 82 06/26/2018    CREATININE 1 88 (H) 06/26/2018    CREATININE 2 34 (H) 06/22/2018     Lab Results   Component Value Date    INR 1 31 06/22/2018

## 2018-06-30 PROCEDURE — 97110 THERAPEUTIC EXERCISES: CPT

## 2018-06-30 PROCEDURE — 97535 SELF CARE MNGMENT TRAINING: CPT

## 2018-06-30 PROCEDURE — 97530 THERAPEUTIC ACTIVITIES: CPT

## 2018-06-30 PROCEDURE — 97116 GAIT TRAINING THERAPY: CPT

## 2018-06-30 RX ORDER — POTASSIUM CHLORIDE 20 MEQ/1
40 TABLET, EXTENDED RELEASE ORAL ONCE
Status: DISCONTINUED | OUTPATIENT
Start: 2018-06-30 | End: 2018-06-30

## 2018-06-30 RX ADMIN — ASPIRIN 81 MG 81 MG: 81 TABLET ORAL at 08:45

## 2018-06-30 RX ADMIN — MEGESTROL ACETATE 20 MG: 20 TABLET ORAL at 21:47

## 2018-06-30 RX ADMIN — FUROSEMIDE 80 MG: 80 TABLET ORAL at 08:45

## 2018-06-30 RX ADMIN — HEPARIN SODIUM 5000 UNITS: 5000 INJECTION INTRAVENOUS; SUBCUTANEOUS at 21:46

## 2018-06-30 RX ADMIN — MEGESTROL ACETATE 20 MG: 20 TABLET ORAL at 12:01

## 2018-06-30 RX ADMIN — MEGESTROL ACETATE 20 MG: 20 TABLET ORAL at 17:27

## 2018-06-30 RX ADMIN — MEGESTROL ACETATE 20 MG: 20 TABLET ORAL at 08:51

## 2018-06-30 RX ADMIN — ISOSORBIDE DINITRATE 20 MG: 10 TABLET ORAL at 08:45

## 2018-06-30 RX ADMIN — FAMOTIDINE 20 MG: 20 TABLET, FILM COATED ORAL at 08:45

## 2018-06-30 RX ADMIN — HYDRALAZINE HYDROCHLORIDE 25 MG: 25 TABLET ORAL at 08:45

## 2018-06-30 RX ADMIN — CARVEDILOL 3.12 MG: 3.12 TABLET, FILM COATED ORAL at 07:34

## 2018-06-30 RX ADMIN — HEPARIN SODIUM 5000 UNITS: 5000 INJECTION INTRAVENOUS; SUBCUTANEOUS at 08:45

## 2018-06-30 NOTE — NURSING NOTE
Patient resting comfortably in bed at this time  No signs of distress noted  Patient able to stand pivot transfer with one assist   Patient continues with oxygen at 2L via N/C  Patient remains forgetful bed alarm in place for patient safety  Call bell within reach  Will continue to monitor patient and follow plan of care

## 2018-06-30 NOTE — PROGRESS NOTES
06/29/18 1500   Memory Skills   Orientation Level Oriented X4   Memory (FIM) 5 - Needs cueing reminders <10%   Social Interaction (FIM) 6 - Interacts appropriately with others BUT requires extra  time   Auditory Comprehension   EffectiveTechniques Pausing;Repetition; Slowed speech;Stressing words   Speech/Swallow Mechanism Exam   Labial Strength Reduced   Lingual Strength Moderate reduced   Dentition Nearly edentulous   Volitional Cough Weak   Speech/Language/Cognition Assessmetn   Treatment Assessment Speech Pathology Daily Treatment Note   Recommendations   Diet Solid Recommendation Level 2 Dysphagia/ mechanical soft/altered   Diet Liquid Recommendation Thin liquid   General Precautions Aspiration precautions;Upright as possible for all oral intake   Compensatory Swallowing Strategies Swallow 2 times per bite/sip; External pacing; Chin tuck;Effortful swallow; Coordinate breathing and swallowing   QI: Eating   Assistance Needed Set-up / clean-up   Eating CARE Score 5   Swallow Assessment   Swallow Treatment Assessment Speech Pathology Daily Treatment Note   SLP Therapy Minutes   SLP Time In 1500   SLP Time Out 1600   SLP Total Time (minutes) 60   SLP Mode of treatment - Individual (minutes) 40   SLP Mode of treatment - Concurrent (minutes) 20   Daily FIM Score   Problem solving (FIM) 5 - Solves complex problems But requires cues from helper   Comprehension (FIM) 6 - Has only MILD difficulty with complex/abstract info   Expression (FIM) 6 - Has only MILD difficulty with complex/abstract info   Eating (FIM) 6 - Patient requires modified diet

## 2018-06-30 NOTE — PROGRESS NOTES
OT      06/30/18 1112   Pain Assessment   Pain Assessment 0-10   Pain Score 8   Pain Type Chronic pain  (Bursitis)   Pain Location Shoulder   Pain Orientation Right   Right Upper Extremity- Strength   R Shoulder Flexion;ABduction; Extension;Horizontal ABduction; External rotation   R Elbow Elbow flexion;Elbow extension   R Wrist Wrist flexion;Wrist extension   R Weight/Reps/Sets AROM  3 x 10   Left Upper Extremity-Strength   L Shoulder Flexion;ABduction; Extension   L Elbow Elbow flexion;Elbow extension   Equipment Dumbell   L Weights/Reps/Sets 1# weight 3 x 10   Activity Tolerance   Activity Tolerance Patient limited by fatigue;Patient limited by pain   Assessment   Treatment Assessment Patient tolerated tx well, c/o fatigue  Educated in safety and energy conservation      Daily Note

## 2018-06-30 NOTE — PROGRESS NOTES
OT Daily Note       06/30/18 0338   Pain Assessment   Pain Assessment No/denies pain   QI: Sit to Stand   Assistance Needed Physical assistance   Assistance Provided by [place] Less than 25%   Comment with RW   Sit to Stand CARE Score 3   Exercise Tools   Exercise Tools Yes   Activity Tolerance   Activity Tolerance Patient tolerated treatment well  (reports bursitis in R UE limits at times)   Assessment   Treatment Assessment Tolerating well  Pleasant and cooperative  Talkative throughout  Continue to progress as status allows     Participates in UE exercises including Arm bike 5 minutes forward and back  Rest break between sets  Min A from sit to stand at table with RW  Stands short amounts of time for functional activities

## 2018-06-30 NOTE — SPEECH THERAPY NOTE
Speech Pathology Daily Treatment Note  Patient seen concurrent as patient gains through talkback method of verbalizing therapeutic techniques and safety precautions to others  SLP focused on speech/cognitive communication skills in terms of compehension of daily routine and therapeutic program   SLP utilized visual aids which included the patient's daily schedule, wall clock and visualization of outside scenery via the window  Patient was requested to calculate the current time and determine therapy sessions about to take place, or those which have already occurred and recall or reason the tasks which take place according to the type of therapy  Speech Pathology Daily Treatment Note  Patient seen 1:1 post Video Barium Swallow Study to discuss the results of the test and how it pertains to decisions regarding diet texture and safety precautions  Compensatory safe swallow education provided which included appropriate 90 degree head/neck/trunk posture, prep to small bites, small and single sips of liquid,complete oral breakdown of solids with formation of a cohesive bolus, oral closure with spoon retrieval, decreased speaking on oral phase, oral clearance prior to reintroduction of bolus, alternate solids with liquids, and rate control  Diet texture change recommended to the physician for thin liquids secondary to good demonstration of the strategies during testing and post with SLP and limited s/s dysphagia with small sips of thin

## 2018-06-30 NOTE — PROGRESS NOTES
PT NOTE:     06/30/18 1130   Pain Assessment   Pain Assessment 0-10   Pain Score No Pain   Transfer Bed/Chair/Wheelchair   Stand Pivot Contact Guard   Sit to Stand Contact Guard   Stand to [facility]   Ambulation   Primary Discharge Mode of Locomotion Walk   Walk Assist Level Contact Guard   Gait Pattern Slow [place]   Assist Device Roller Walker   Distance Walked (feet) 100 ft   Limitations Noted In Balance;Posture   Walking (FIM) 2 - Patient ambulates between 50 - 149 feet regardless of assist/device/set up   Stairs   # of Steps 10   Stairs (FIM) 2 - Patient goes up and down 4 - 11 stairs regardless of assist/device/setup   Therapeutic Interventions   Strengthening (seated 3 x 10)   Balance standing tolerance training    Other transfer training activity   Assessment   Treatment Assessment (Patient tolerated treatment well)   PT Therapy Minutes   PT Time In 1130   PT Time Out 1230   PT Total Time (minutes) 60       Patient tolerated treatment but required cues for safety   Incorporated ambulation with breathig to increase activity tolerance

## 2018-06-30 NOTE — PROGRESS NOTES
+1 edema ankles,  Lungs clear but decreased  VILLA  Oxygen maintained at 2 lpm n/c  In no distress  Tolerating thin liquids without difficulty  Needs verbal cues at times for safety awareness  All meds reviewed with pt

## 2018-06-30 NOTE — PROGRESS NOTES
Progress Note - Susan Jane 1934, 80 y o  male MRN: [id number]    Unit/Bed#: [facility] 217-02 Encounter: [id number]    Primary Care Provider: Kevin Guzman MD   Date and time admitted to hospital: 6/25/2018  3:57 PM        * [facility]    PT/OT/ST  Hospitalist consult  Isordil/Coreg/Lasix/Apresoline        Moderate protein-calorie malnutrition ([address] )   Assessment & Plan    Megace        COPD (chronic obstructive pulmonary disease) ([facility] [address] )   Assessment & Plan    COPD    O2        Dysphagia   Assessment & Plan    Dysphagia      ST  Modified diet  VFSS            Vitals:  Temp:  [95 4 °F (35 2 °C)-96 8 °F (36 °C)] 95 4 °F (35 2 °C)  HR:  [66-73] 73  Resp:  [18] 18  BP: (100-116)/(50-64) 116/60    Physical Exam:  General: alert, no apparent distress, cooperative and comfortable  Eye: Normal external eye, conjunctiva, lids   Ears: Normal external ears  Nose: Normal external nose, mucus membranes  COR: RRR Pacemaker palpable  Pulmonary: chest expansion normal, no retractions, no accessory muscle usage, no wheezes, rales, or rhonchi   Abdomen: soft, nontender, nondistended, no masses or organomegaly  Skin/Extremity: no rashes, no erythema, no peripheral edema    Neurologic: Awake alert orientedx3  Psych: normal mood, behavior, speech, dress, and thought processes  Musculoskeletal severe generalized muscle atrophy  Patient is progressing with ADLs and functional mobility, cognition & speech      Laboratory:    Lab Results   Component Value Date    HGB 13 4 (L) 06/26/2018    HGB 15 4 06/22/2018    HCT 40 0 06/26/2018    HCT 46 2 06/22/2018    WBC 8 00 06/26/2018    WBC 8 1 06/22/2018     Lab Results   Component Value Date    BUN 53 (H) 06/26/2018    BUN 53 (H) 06/22/2018     (H) 06/26/2018     06/22/2018    K 3 1 (L) 06/26/2018    K 4 3 06/22/2018     06/26/2018     06/22/2018    GLUCOSE 82 06/26/2018    CREATININE 1 88 (H) 06/26/2018    CREATININE 2 34 (H) 06/22/2018     Lab Results   Component Value Date    INR 1 31 06/22/2018

## 2018-07-01 ENCOUNTER — APPOINTMENT (INPATIENT)
Dept: RADIOLOGY | Facility: HOSPITAL | Age: 83
DRG: 948 | End: 2018-07-01
Payer: MEDICARE

## 2018-07-01 LAB
ANION GAP SERPL CALCULATED.3IONS-SCNC: 9 MMOL/L (ref 4–13)
BUN SERPL-MCNC: 68 MG/DL (ref 7–25)
CALCIUM SERPL-MCNC: 9.2 MG/DL (ref 8.6–10.5)
CHLORIDE SERPL-SCNC: 102 MMOL/L (ref 98–107)
CO2 SERPL-SCNC: 32 MMOL/L (ref 21–31)
CREAT SERPL-MCNC: 2.17 MG/DL (ref 0.7–1.3)
GFR SERPL CREATININE-BSD FRML MDRD: 27 ML/MIN/1.73SQ M
GLUCOSE SERPL-MCNC: 179 MG/DL (ref 65–99)
POTASSIUM SERPL-SCNC: 3.4 MMOL/L (ref 3.5–5.5)
SODIUM SERPL-SCNC: 143 MMOL/L (ref 134–143)

## 2018-07-01 PROCEDURE — 99232 SBSQ HOSP IP/OBS MODERATE 35: CPT | Performed by: INTERNAL MEDICINE

## 2018-07-01 PROCEDURE — 71045 X-RAY EXAM CHEST 1 VIEW: CPT

## 2018-07-01 PROCEDURE — 94760 N-INVAS EAR/PLS OXIMETRY 1: CPT

## 2018-07-01 PROCEDURE — 80048 BASIC METABOLIC PNL TOTAL CA: CPT | Performed by: INTERNAL MEDICINE

## 2018-07-01 PROCEDURE — 94640 AIRWAY INHALATION TREATMENT: CPT

## 2018-07-01 RX ORDER — POTASSIUM CHLORIDE 20 MEQ/1
20 TABLET, EXTENDED RELEASE ORAL DAILY
Status: DISCONTINUED | OUTPATIENT
Start: 2018-07-01 | End: 2018-07-05 | Stop reason: HOSPADM

## 2018-07-01 RX ORDER — IPRATROPIUM BROMIDE AND ALBUTEROL SULFATE 2.5; .5 MG/3ML; MG/3ML
3 SOLUTION RESPIRATORY (INHALATION) ONCE
Status: COMPLETED | OUTPATIENT
Start: 2018-07-01 | End: 2018-07-01

## 2018-07-01 RX ORDER — FUROSEMIDE 10 MG/ML
40 INJECTION INTRAMUSCULAR; INTRAVENOUS ONCE
Status: COMPLETED | OUTPATIENT
Start: 2018-07-01 | End: 2018-07-01

## 2018-07-01 RX ADMIN — FUROSEMIDE 80 MG: 80 TABLET ORAL at 09:52

## 2018-07-01 RX ADMIN — HEPARIN SODIUM 5000 UNITS: 5000 INJECTION INTRAVENOUS; SUBCUTANEOUS at 21:24

## 2018-07-01 RX ADMIN — ALUMINUM HYDROXIDE, MAGNESIUM HYDROXIDE, AND SIMETHICONE 30 ML: 200; 200; 20 SUSPENSION ORAL at 22:46

## 2018-07-01 RX ADMIN — MEGESTROL ACETATE 20 MG: 20 TABLET ORAL at 21:24

## 2018-07-01 RX ADMIN — FAMOTIDINE 20 MG: 20 TABLET, FILM COATED ORAL at 09:52

## 2018-07-01 RX ADMIN — POTASSIUM CHLORIDE 20 MEQ: 1500 TABLET, EXTENDED RELEASE ORAL at 15:42

## 2018-07-01 RX ADMIN — CARVEDILOL 3.12 MG: 3.12 TABLET, FILM COATED ORAL at 07:47

## 2018-07-01 RX ADMIN — HYDRALAZINE HYDROCHLORIDE 25 MG: 25 TABLET ORAL at 17:29

## 2018-07-01 RX ADMIN — HEPARIN SODIUM 5000 UNITS: 5000 INJECTION INTRAVENOUS; SUBCUTANEOUS at 09:51

## 2018-07-01 RX ADMIN — MEGESTROL ACETATE 20 MG: 20 TABLET ORAL at 13:16

## 2018-07-01 RX ADMIN — ISOSORBIDE DINITRATE 20 MG: 10 TABLET ORAL at 09:51

## 2018-07-01 RX ADMIN — MEGESTROL ACETATE 20 MG: 20 TABLET ORAL at 09:56

## 2018-07-01 RX ADMIN — CARVEDILOL 3.12 MG: 3.12 TABLET, FILM COATED ORAL at 15:42

## 2018-07-01 RX ADMIN — IPRATROPIUM BROMIDE AND ALBUTEROL SULFATE 3 ML: .5; 3 SOLUTION RESPIRATORY (INHALATION) at 12:58

## 2018-07-01 RX ADMIN — FUROSEMIDE 40 MG: 10 INJECTION, SOLUTION INTRAVENOUS at 15:43

## 2018-07-01 RX ADMIN — HYDRALAZINE HYDROCHLORIDE 25 MG: 25 TABLET ORAL at 09:51

## 2018-07-01 RX ADMIN — ASPIRIN 81 MG 81 MG: 81 TABLET ORAL at 09:52

## 2018-07-01 RX ADMIN — MEGESTROL ACETATE 20 MG: 20 TABLET ORAL at 17:30

## 2018-07-01 NOTE — NURSING NOTE
Patient resting comfortably in bed at this time  No signs of distress noted  Patient able to stand pivot transfer with one assist to the bedside commode and was able to toilet self with supervision  Oxygen at 3L via N/C  Patient VILLA at times overnight  Will discuss daily weights with oncoming RN  Call bell within reach  Will continue to monitor patient and follow plan of care

## 2018-07-01 NOTE — PROGRESS NOTES
IV # 22 abbo was stared in left forearm without difficulty  Pt tolerated procedure well  IV lasix was given as ordered  Promise same plan of care  All med education and safety reinforced with pt  Urbano Orozco

## 2018-07-01 NOTE — PROGRESS NOTES
Progress Note - Quinton Kaur 1934, 80 y o  male MRN: [id number]    Unit/Bed#: [facility] 217-02 Encounter: [id number]    Primary Care Provider: Shawna Cates MD   Date and time admitted to hospital: 2018  3:57 PM        Acute on chronic systolic CHF (congestive heart failure) (HCC)   Assessment & Plan    -Continue lasix, Coreg, and isosorbide  -will follow up repeat CXR        * Debility   Assessment & Plan    Continue PT OT rehab services per Dr Gabriela Xavier        Atrial fibrillation [facility])   Assessment & Plan    -rate controlled  -Continue carvedilol and continue aspirin        Moderate protein-calorie malnutrition (HCC)   Assessment & Plan    Malnutrition Findings:      Degree of Malnutrition: Malnutrition of moderate degree    BMI Findings:  BMI Classifications: Underweight < 18 5     Body mass index is 18 25 kg/m²  Continue Megace               Discussions with Specialists or Other Care Team Provider: yes    Education and Discussions with Family / Patient: yes    Time Spent for Care: 30 minutes  More than 50% of total time spent on counseling and coordination of care as described above  Current Length of Stay: 6 day(s)    Current Patient Status: Inpatient Rehab   Certification Statement: The patient will continue to require additional inpatient hospital stay due to debility    Discharge Plan: per primary team    Code Status: Level 1 - Full Code      Subjective:   Pt was noted to be SOB this morning, improved after sitting up and receiving breathing treatment  No chest pain or palpitations  Tolerating PT and diet  Objective:     Vitals:   Temp (24hrs), Av 9 °F (35 5 °C), Min:94 5 °F (34 7 °C), Max:97 3 °F (36 3 °C)    HR:  [60-72] 72  Resp:  [18-22] 22  BP: ()/(58-70) 118/70  SpO2:  [87 %-98 %] 95 %  Body mass index is 19 12 kg/m²  Input and Output Summary (last 24 hours):        Intake/Output Summary (Last 24 hours) at 18 1440  Last data filed at 18 1241   Gross per 24 hour   Intake              920 ml   Output              300 ml   Net              620 ml       Physical Exam:     Physical Exam   Constitutional:   -frail elderly male   HENT:   Head: Normocephalic and atraumatic  Eyes: Conjunctivae and EOM are normal    Neck: Normal range of motion  Neck supple  Cardiovascular: Normal rate and regular rhythm  Pulmonary/Chest: No respiratory distress    -mild wheeze   Abdominal: Soft  He exhibits no distension  There is no tenderness  Musculoskeletal:   -trace edema   Neurological: He is alert  Skin: Skin is warm and dry  Psychiatric: He has a normal mood and affect  Additional Data:     Labs:      Results from last 7 days  Lab Units 06/26/18  0516   WBC Thousand/uL 8 00   HEMOGLOBIN g/dL 13 4*   HEMATOCRIT % 40 0   PLATELETS Thousands/uL 104*   NEUTROS PCT % 82*   LYMPHS PCT % 6*   MONOS PCT % 8   EOS PCT % 4       Results from last 7 days  Lab Units 07/01/18  1221   SODIUM mmol/L 143   POTASSIUM mmol/L 3 4*   CHLORIDE mmol/L 102   CO2 mmol/L 32*   BUN mg/dL 68*   CREATININE mg/dL 2 17*   CALCIUM mg/dL 9 2   GLUCOSE RANDOM mg/dL 179*                     * I Have Reviewed All Lab Data Listed Above  * Additional Pertinent Lab Tests Reviewed:  Vi 66 Admission Reviewed    Imaging:    Imaging Reports Reviewed Today Include: CXR pending  Imaging Personally Reviewed by Myself Includes:  none    Recent Cultures (last 7 days):           Last 24 Hours Medication List:     Current Facility-Administered Medications:  acetaminophen 650 mg Oral 4x Daily PRN Jame Paredes MD   aluminum-magnesium hydroxide-simethicone 30 mL Oral Q4H PRN Jame Paredes MD   aspirin 81 mg Oral Daily Jame Paredes MD   bisacodyl 10 mg Rectal Daily PRN Jame Paredes MD   carvedilol 3 125 mg Oral BID With Inessa Marks MD   docusate sodium 100 mg Oral BID Jame Paredes MD   famotidine 20 mg Oral Daily Jame Paredes MD   furosemide 80 mg Oral Daily Mai Rashede MD   heparin (porcine) 5,000 Units Subcutaneous Q12H Sara Henriquez MD   hydrALAZINE 25 mg Oral BID Mai Rasheed MD   isosorbide dinitrate 20 mg Oral Daily Mai Rasheed MD   meclizine 12 5 mg Oral TID PRN Mai Rasheed MD   megestrol 20 mg Oral 4x Daily Mai Rasheed MD   ondansetron 4 mg Oral Q6H PRN Mai Rasheed MD   polyethylene glycol 17 g Oral BID Mai Rasheed MD   senna 2 tablet Oral Daily Mai Rasheed MD        Today, Patient Was Seen By: Alen Sanchez MD    ** Please Note: Dictation voice to text software may have been used in the creation of this document   **

## 2018-07-01 NOTE — PROGRESS NOTES
Seen by Dr David Meza, stat labs drawn ,portable CXR done as ordered  Pt was c/o SOB while lying in bed with sat @93% on oxygen 2 lpm n/c  Min assist OOB for lunch and feels much better  Pt instructed on deep breathing techniques  Pt tends to mouth breathe most of the time

## 2018-07-01 NOTE — PROGRESS NOTES
IV lasix ordered by hospitalist and will give  In no acute distress  Resp tx's are a new order  In no distress  Resp easy and regular on oxygen at 2 lpm n/c  Continue to monitor

## 2018-07-02 LAB
ANION GAP SERPL CALCULATED.3IONS-SCNC: 7 MMOL/L (ref 4–13)
BUN SERPL-MCNC: 66 MG/DL (ref 7–25)
CALCIUM SERPL-MCNC: 9.2 MG/DL (ref 8.6–10.5)
CHLORIDE SERPL-SCNC: 104 MMOL/L (ref 98–107)
CO2 SERPL-SCNC: 34 MMOL/L (ref 21–31)
CREAT SERPL-MCNC: 2.26 MG/DL (ref 0.7–1.3)
GFR SERPL CREATININE-BSD FRML MDRD: 26 ML/MIN/1.73SQ M
GLUCOSE SERPL-MCNC: 107 MG/DL (ref 65–99)
POTASSIUM SERPL-SCNC: 3.7 MMOL/L (ref 3.5–5.5)
SODIUM SERPL-SCNC: 145 MMOL/L (ref 134–143)

## 2018-07-02 PROCEDURE — 92507 TX SP LANG VOICE COMM INDIV: CPT

## 2018-07-02 PROCEDURE — 97116 GAIT TRAINING THERAPY: CPT

## 2018-07-02 PROCEDURE — 92526 ORAL FUNCTION THERAPY: CPT

## 2018-07-02 PROCEDURE — 97530 THERAPEUTIC ACTIVITIES: CPT

## 2018-07-02 PROCEDURE — 97537 COMMUNITY/WORK REINTEGRATION: CPT

## 2018-07-02 PROCEDURE — 97110 THERAPEUTIC EXERCISES: CPT

## 2018-07-02 PROCEDURE — 80048 BASIC METABOLIC PNL TOTAL CA: CPT | Performed by: INTERNAL MEDICINE

## 2018-07-02 PROCEDURE — 99232 SBSQ HOSP IP/OBS MODERATE 35: CPT | Performed by: INTERNAL MEDICINE

## 2018-07-02 PROCEDURE — G0515 COGNITIVE SKILLS DEVELOPMENT: HCPCS

## 2018-07-02 PROCEDURE — 97535 SELF CARE MNGMENT TRAINING: CPT

## 2018-07-02 RX ORDER — ISOSORBIDE DINITRATE 10 MG/1
10 TABLET ORAL DAILY
Status: DISCONTINUED | OUTPATIENT
Start: 2018-07-03 | End: 2018-07-05 | Stop reason: HOSPADM

## 2018-07-02 RX ORDER — FUROSEMIDE 10 MG/ML
40 INJECTION INTRAMUSCULAR; INTRAVENOUS
Status: DISCONTINUED | OUTPATIENT
Start: 2018-07-02 | End: 2018-07-05

## 2018-07-02 RX ORDER — FUROSEMIDE 40 MG/1
40 TABLET ORAL
Status: DISCONTINUED | OUTPATIENT
Start: 2018-07-02 | End: 2018-07-02

## 2018-07-02 RX ORDER — FUROSEMIDE 10 MG/ML
40 INJECTION INTRAMUSCULAR; INTRAVENOUS ONCE
Status: COMPLETED | OUTPATIENT
Start: 2018-07-02 | End: 2018-07-02

## 2018-07-02 RX ORDER — HYDRALAZINE HYDROCHLORIDE 10 MG/1
10 TABLET, FILM COATED ORAL 2 TIMES DAILY
Status: DISCONTINUED | OUTPATIENT
Start: 2018-07-02 | End: 2018-07-05 | Stop reason: HOSPADM

## 2018-07-02 RX ADMIN — POTASSIUM CHLORIDE 20 MEQ: 1500 TABLET, EXTENDED RELEASE ORAL at 08:16

## 2018-07-02 RX ADMIN — CARVEDILOL 3.12 MG: 3.12 TABLET, FILM COATED ORAL at 08:15

## 2018-07-02 RX ADMIN — HEPARIN SODIUM 5000 UNITS: 5000 INJECTION INTRAVENOUS; SUBCUTANEOUS at 21:32

## 2018-07-02 RX ADMIN — CARVEDILOL 3.12 MG: 3.12 TABLET, FILM COATED ORAL at 16:09

## 2018-07-02 RX ADMIN — ALUMINUM HYDROXIDE, MAGNESIUM HYDROXIDE, AND SIMETHICONE 30 ML: 200; 200; 20 SUSPENSION ORAL at 09:54

## 2018-07-02 RX ADMIN — FUROSEMIDE 40 MG: 40 TABLET ORAL at 16:10

## 2018-07-02 RX ADMIN — MEGESTROL ACETATE 20 MG: 20 TABLET ORAL at 21:32

## 2018-07-02 RX ADMIN — MEGESTROL ACETATE 20 MG: 20 TABLET ORAL at 08:15

## 2018-07-02 RX ADMIN — MEGESTROL ACETATE 20 MG: 20 TABLET ORAL at 12:01

## 2018-07-02 RX ADMIN — FAMOTIDINE 20 MG: 20 TABLET, FILM COATED ORAL at 08:15

## 2018-07-02 RX ADMIN — FUROSEMIDE 40 MG: 10 INJECTION, SOLUTION INTRAVENOUS at 19:24

## 2018-07-02 RX ADMIN — ASPIRIN 81 MG 81 MG: 81 TABLET ORAL at 08:15

## 2018-07-02 RX ADMIN — HYDRALAZINE HYDROCHLORIDE 10 MG: 10 TABLET, FILM COATED ORAL at 16:09

## 2018-07-02 RX ADMIN — HEPARIN SODIUM 5000 UNITS: 5000 INJECTION INTRAVENOUS; SUBCUTANEOUS at 08:15

## 2018-07-02 RX ADMIN — FUROSEMIDE 40 MG: 40 TABLET ORAL at 11:05

## 2018-07-02 NOTE — PROGRESS NOTES
07/02/18 1200   Executive Function Skills   [facility]   Impulsive WFL   Task Initiation [facility]   Memory Skills   Short Term Recent Recall (good recall 3 of 5 compensatory techniques )   Memory (FIM) 5 - Recalls/performs request 90% of time   Social Interaction (FIM) 6 - Interacts appropriately with others BUT requires extra  time   Speech/Swallow Mechanism Exam   Labial Strength Reduced   Lingual Strength Mild reduced   Lingual ROM Mild reduced   Mandible Impaired   Volitional Cough Weak   Swallow Information   Current Risks for Dysphagia & Aspiration Weak cough   Current Symptoms/Concerns (extended meal time due to reduced endurance for chewing)   Consistencies Assessed and Performance   Oral Stage Moderate impaired   Oral Stage Comment (extended oral phase)   Phargngeal Stage Mild impaired   Pharyngeal Stage Comment (coughin up mucous into a napkin  nursing is aware)   Recommendations   Recommendations (alternate liquids solids maintain moist oral cavity)   QI: Eating   Assistance Needed Set-up / clean-up   Assistance Provided by [place] No physical assistance   Eating CARE Score 5   Swallow Assessment   Swallow Treatment Assessment Speech Pathology Daily Treatment Note - Dysphagia   SLP Therapy Minutes   SLP Time In 1200   SLP Time Out 1230   SLP Total Time (minutes) 30   SLP Mode of treatment - Group (minutes) 30   Daily FIM Score   Problem solving (FIM) 5 - Solves complex problems But requires cues from helper   Eating (FIM) 4 - Patient completes 75%-90% of tube feed tasks

## 2018-07-02 NOTE — ASSESSMENT & PLAN NOTE
-patients wt is increased over last few days and CXR shows worsening pleural effusion  -start IV lasix 40mg bid as BP tolerates  -cont coreg, imdur  -re-consult cardio  -repeat CXR in AM

## 2018-07-02 NOTE — PROGRESS NOTES
07/02/18 1300   Pain Assessment   Pain Type Chronic pain   Pain Location Arm  (right shoulder)   Pain Onset Gradual   Clinical Progression Not changed   Response to Interventions (able to use functionally for tabletop tasks)   Transfer Bed/Chair/Wheelchair   Sit to Stand Supervision   Stand to Sit Supervision   Sit to Supine Supervision   Bed, Chair, Wheelchair Transfer (FIM) 5 - Patient requires supervision/monitoring   Toileting   Able to Pull Clothing down yes, up yes  Manage Hygiene Bladder   Limitations Noted In Balance; Safety   Toileting (FIM) 4 - Patient requires incidental assistance for pulling up or down one side of pants   Therapeutic Exercise - ROM   UE-ROM Yes  (card match and gripper with pegs BUE)   Assessment   Treatment Assessment Patient presents sitting in wheelchair following lunch, agreeable to OT services  Patient completes UB therex to increase strength and ROM with R shoulder pain noted although able to complete tasks with rest breaks and patient states from an old injury  O2 at 2L/min throughout session and rest breaks as needed  Pt requests laying down with S/CGA toileting and transfers  Problem List Decreased endurance; Impaired balance;Decreased safety awareness   Plan   Treatment/Interventions ADL retraining; Therapeutic exercise; Endurance training   Progress Progressing toward goals   OT Therapy Minutes   OT Time In 1300   OT Time Out 1400   OT Total Time (minutes) 60   OT Mode of treatment - Individual (minutes) 60   Therapy Time missed   Time missed?  No

## 2018-07-02 NOTE — ASSESSMENT & PLAN NOTE
-kidney function slightly above baseline  -pt is on IV diuresis, will monitor  -follow up with nephro consult  -avoid any further nephro toxic meds

## 2018-07-02 NOTE — PROGRESS NOTES
Patient complaining of indigestion prior to lunch while in therapy  Patient appears very dsypneic on exertion and at rest  02 remains at 2 liters  Lasix 80 mg held this AM due to low BP  [facility] made aware of same  New order for lasix 40 mg BID  Patient given lasix 40 dose and voiding sufficient quantities  Ankles noted with +1 edema  Patient tolerated eating lunch but now complains of further indigestion and regurgitation  States mylanta only helped a little bit earlier  Dr Carlin Essex notified of same and he stated he will come see patient  Will continue to monitor

## 2018-07-02 NOTE — PROGRESS NOTES
07/02/18 1230   Executive Function Skills   Planning [facility]   Organization [facility]   Auditory Comprehension   Comphrehends Conversation Simple   Speech/Language/Cognition Assessmetn   Treatment Assessment Speech Pathology Daily Treatment Note - Speech/Cognition   SLP Therapy Minutes   SLP Time In 1230   SLP Time Out 1300   SLP Total Time (minutes) 30   SLP Mode of treatment - Individual (minutes) 30   Daily FIM Score   Problem solving (FIM) 5 - Solves complex problems But requires cues from helper   Comprehension (FIM) 5 - Understands basic directions and conversation   Expression (FIM) 6 - Expresses complex/abstract but requires:  more time

## 2018-07-02 NOTE — PCC SPEECH THERAPY
Patient is participating in skilled speech therapy focusing on swallow oral function and speech cognitive communication skills  Patient has made gains this treatment period in diet texture tolerance  A video swallow study was performed secondary to improved swallow and demonstration of compensatory techniques at mealtime  Patient passed the study and was recommended and ordered thin liquids  Patient is seen each day with a meal or snack to focus on memory and demonstration of swallow safety protocol  He is gaining each treatment session in recalling the techniques in a more independent fashion to decrease the level of supervision and cues  Speech is targeted via strengthening and coordination training of oral articulators as well as focus on coordination of respiration with phonation  Further work is needed in this area as patient persists with open mouth posture and weak jaw, lips, cheeks and tongue  Cognitively, patient varies from morning to evening  He presents as better focused with good recall of techniques early in the day to supervision, min A  His skills decrease later in the day and with fatigue  At times he can be argumentative when he forgets why he is being cued or instructed for safety

## 2018-07-02 NOTE — PROGRESS NOTES
07/02/18 0930   Mobility   Bed, Chair, Wheelchair Transfer (FIM) 4 - Patient requires steadying assist or light touching   Walking (FIM) 2 - Patient ambulates between 50 - 149 feet regardless of assist/device/set up   Distance Walked (feet) 100 ft   Stairs (FIM) 2 - Patient goes up and down 4 - 11 stairs regardless of assist/device/setup   Communication   Comprehension (FIM) 5 - Needs help/cues, repetition only RARELY ( < 10% of the time)   Expression (FIM) 5 - Needs help/cues only RARELY (< 10% of the time)   Psychosocial    Social Interaction (FIM) 5 - Requires redirection but less than 10% of the time     Cognition   Problem solving (FIM) 4 - Solves basic problems 75-89% of time   Memory (FIM) 5 - Recalls/performs request 90% of time

## 2018-07-02 NOTE — PROGRESS NOTES
Patient Voiding small amounts  Continues with SOB on rest and moist cough noted  Unable to cough up any secretions  02 continues at 2 liters  Patient voided small amount in urinal and bladder scanned for 86 ml  Dr Randy Garcia to be made aware of same

## 2018-07-02 NOTE — PROGRESS NOTES
Progress Note - Wilian Butterfield 1934, 80 y o  male MRN: [id number]    Unit/Bed#: -02 Encounter: [id number]    Primary Care Provider: Lupe Yun MD   Date and time admitted to hospital: 2018  3:57 PM        Acute on chronic systolic CHF (congestive heart failure) ([facility] [address] )   Assessment & Plan    -patients wt is increased over last few days and CXR shows worsening pleural effusion  -start IV lasix 40mg bid as BP tolerates  -cont coreg, imdur  -re-consult cardio  -repeat CXR in AM        Acute kidney injury superimposed on chronic kidney disease ([facility] [address] )   Assessment & Plan    -kidney function slightly above baseline  -pt is on IV diuresis, will monitor  -follow up with nephro consult  -avoid any further nephro toxic meds        COPD (chronic obstructive pulmonary disease) ([facility])   Assessment & Plan    -cont O2 via NC  -duonebs as needed        Atrial fibrillation ([facility])   Assessment & Plan    -rate controlled  -Continue carvedilol and continue aspirin        * Debility   Assessment & Plan    -Continue PT OT rehab services per Dr Jaja Young with [facility] or Other Care Team Provider: discussed with RN    Education and Discussions with Family / Patient: yes    Time Spent for Care: 45 minutes  More than 50% of total time spent on counseling and coordination of care as described above  Current Length of Stay: 7 day(s)    Current Patient Status: Inpatient Rehab   Certification Statement: The patient will continue to require additional inpatient hospital stay due to CHF/debility    Discharge Plan: per primary team    Code Status: Level 1 - Full Code      Subjective:   Pt with intermittent SOB, denies chest pain  Currently receiving PO lasix with minimal urine output    Bladder scan with no significant residual   SOB worse with exertion    Objective:     Vitals:   Temp (24hrs), Av 9 °F (35 5 °C), Min:95 7 °F (35 4 °C), Max:96 °F (35 6 °C)    HR:  [66-80] 80  Resp:  [16] 16  BP: (102-118)/(60-70) 118/70  SpO2:  [95 %-96 %] 95 %  Body mass index is 19 47 kg/m²  Input and Output Summary (last 24 hours): Intake/Output Summary (Last 24 hours) at 07/02/18 1920  Last data filed at 07/02/18 1841   Gross per 24 hour   Intake              360 ml   Output              350 ml   Net               10 ml       Physical Exam:     Physical Exam   Constitutional:   -frail elderly male   HENT:   Head: Normocephalic and atraumatic  Eyes: Conjunctivae and EOM are normal    Neck: Normal range of motion  Neck supple  Cardiovascular: Normal rate and regular rhythm  Pulmonary/Chest: No respiratory distress  He has wheezes  -decreased breath sounds b/l bases   Abdominal: Soft  He exhibits no distension  There is no tenderness  Musculoskeletal: Normal range of motion  He exhibits edema  Neurological: He is alert    -follows simple commands   Skin: Skin is warm and dry  Psychiatric: He has a normal mood and affect  Additional Data:     Labs:      Results from last 7 days  Lab Units 06/26/18  0516   WBC Thousand/uL 8 00   HEMOGLOBIN g/dL 13 4*   HEMATOCRIT % 40 0   PLATELETS Thousands/uL 104*   NEUTROS PCT % 82*   LYMPHS PCT % 6*   MONOS PCT % 8   EOS PCT % 4       Results from last 7 days  Lab Units 07/02/18  0535   SODIUM mmol/L 145*   POTASSIUM mmol/L 3 7   CHLORIDE mmol/L 104   CO2 mmol/L 34*   BUN mg/dL 66*   CREATININE mg/dL 2 26*   CALCIUM mg/dL 9 2   GLUCOSE RANDOM mg/dL 107*                     * I Have Reviewed All Lab Data Listed Above  * Additional Pertinent Lab Tests Reviewed:  Vi 66 Admission Reviewed    Imaging:    Imaging Reports Reviewed Today Include: cxr  Imaging Personally Reviewed by Myself Includes:  none    Recent Cultures (last 7 days):           Last 24 Hours Medication List:     Current Facility-Administered Medications:  acetaminophen 650 mg Oral 4x Daily PRN Karel Benson MD   aluminum-magnesium hydroxide-simethicone 30 mL Oral Q4H PRN Luis Manuel Mohamud MD   aspirin 81 mg Oral Daily Luis Manuel Mohamud MD   bisacodyl 10 mg Rectal Daily PRN Luis Manuel Mohamud MD   carvedilol 3 125 mg Oral BID With Meals JESUS Welsh   docusate sodium 100 mg Oral BID Luis Manuel Mohamud MD   famotidine 20 mg Oral Daily Luis Manuel Mohamud MD   furosemide 40 mg Intravenous Once Isabel Gavin MD   furosemide 40 mg Oral BID (diuretic) JESUS Welsh   heparin (porcine) 5,000 Units Subcutaneous Q12H Debbi Low MD   hydrALAZINE 10 mg Oral BID JESUS Welsh   [START ON 7/3/2018] isosorbide dinitrate 10 mg Oral Daily JESUS Welsh   meclizine 12 5 mg Oral TID PRN Luis Manuel Mohamud MD   megestrol 20 mg Oral 4x Daily Luis Manuel Mohamud MD   ondansetron 4 mg Oral Q6H PRN Luis Manuel Mohamud MD   polyethylene glycol 17 g Oral BID Luis Manuel Mohamud MD   potassium chloride 20 mEq Oral Daily Isabel Gavin MD   senna 2 tablet Oral Daily Luis Manuel Mohamud MD        Today, Patient Was Seen By: Isabel Gavin MD    ** Please Note: Dictation voice to text software may have been used in the creation of this document   **

## 2018-07-02 NOTE — NURSING NOTE
Patient resting comfortably in bed at this time  No signs of distress noted  Patient remains on 2L of oxygen via N/C patient with dyspnea noted on exertion  Patient able to ambulate to the bathroom with contact guard assist   Patient was continent of bowel and bladder overnight  Call bell within reach  Bed alarm in place for patient safety  Will continue to monitor patient and follow plan of care

## 2018-07-02 NOTE — PROGRESS NOTES
Progress Note - Lee Ann James 1934, 80 y o  male MRN: [id number]    Unit/Bed#: [facility] 217-02 Encounter: [id number]    Primary Care Provider: Bakari Colvin MD   Date and time admitted to hospital: 6/25/2018  3:57 PM        * [facility]    PT/OT/ST  Hospitalist consult  Isordil/Coreg/Lasix/Apresoline        Moderate protein-calorie malnutrition ([address] )   Assessment & Plan    Megace        COPD (chronic obstructive pulmonary disease) ([facility] [address] )   Assessment & Plan    COPD    O2        Dysphagia   Assessment & Plan    Dysphagia      ST  Modified diet  VFSS            Vitals:  Temp:  [95 7 °F (35 4 °C)-96 °F (35 6 °C)] 95 7 °F (35 4 °C)  HR:  [66-84] 71  Resp:  [16] 16  BP: (102-126)/(56-62) 102/62    Physical Exam:  General: alert, no apparent distress, cooperative and comfortable  HENMT: Head: Normal, normocephalic, atraumatic  Eye: Normal external eye, conjunctiva, lids   Ears: Normal external ears  Nose: Normal external nose, mucus membranes  COR: [id number]  Pulmonary: chest expansion normal, no retractions, no accessory muscle usage, no wheezes, rales, or rhonchi   Abdomen: soft, nontender, nondistended, no masses or organomegaly  Skin/Extremity: no rashes, no erythema, no peripheral edema   Neurologic: Awake alert orientedx3  Psych: normal mood, behavior, speech, dress, and thought processes  Musculoskeletal: AROM wfl all extremities  Severe generalized muscle atrophy  Patient is progressing with ADLs and functional mobility, cognition & speech        Current Facility-Administered Medications   Medication Dose Route Frequency Provider Last Rate Last Dose    acetaminophen (TYLENOL) tablet 650 mg  650 mg Oral 4x Daily PRN Fausto Hinojosa MD        aluminum-magnesium hydroxide-simethicone (MYLANTA) 200-200-20 mg/5 mL oral suspension 30 mL  30 mL Oral Q4H PRN Fausto Hinojosa MD   30 mL at 07/01/18 2320    aspirin chewable tablet 81 mg  81 mg Oral Daily Fausto Hinojosa MD   81 mg at 07/02/18 0815    bisacodyl (DULCOLAX) rectal suppository 10 mg  10 mg Rectal Daily PRN Roma Isidro MD        carvedilol (COREG) tablet 3 125 mg  3 125 mg Oral BID With Meals Roma Isidro MD   3 125 mg at 07/02/18 0815    docusate sodium (COLACE) capsule 100 mg  100 mg Oral BID Roma Isidro MD   100 mg at 06/29/18 1718    famotidine (PEPCID) tablet 20 mg  20 mg Oral Daily Roma Isidro MD   20 mg at 07/02/18 0815    furosemide (LASIX) tablet 80 mg  80 mg Oral Daily Roma Isidro MD   80 mg at 07/01/18 0695    heparin (porcine) subcutaneous injection 5,000 Units  5,000 Units Subcutaneous Q12H Lanie Villeda MD   5,000 Units at 07/02/18 0815    hydrALAZINE (APRESOLINE) tablet 25 mg  25 mg Oral BID Roma Isidro MD   25 mg at 07/01/18 1729    isosorbide dinitrate (ISORDIL) tablet 20 mg  20 mg Oral Daily Roma Isidro MD   20 mg at 07/01/18 0951    meclizine (ANTIVERT) tablet 12 5 mg  12 5 mg Oral TID PRN Roma Isidro MD        megestrol (MEGACE) tablet 20 mg  20 mg Oral 4x Daily Roma Isidro MD   20 mg at 07/02/18 0815    ondansetron (ZOFRAN-ODT) dispersible tablet 4 mg  4 mg Oral Q6H PRN Roma Isidro MD        polyethylene glycol (MIRALAX) packet 17 g  17 g Oral BID Roma Isidro MD   17 g at 06/28/18 0854    potassium chloride (K-DUR,KLOR-CON) CR tablet 20 mEq  20 mEq Oral Daily Quoc Acevedo MD   20 mEq at 07/02/18 0816    senna (SENOKOT) tablet 17 2 mg  2 tablet Oral Daily Roma Isidro MD   17 2 mg at 06/29/18 0842        Laboratory:    Lab Results   Component Value Date    HGB 13 4 (L) 06/26/2018    HGB 15 4 06/22/2018    HCT 40 0 06/26/2018    HCT 46 2 06/22/2018    WBC 8 00 06/26/2018    WBC 8 1 06/22/2018     Lab Results   Component Value Date    BUN 66 (H) 07/02/2018    BUN 53 (H) 06/22/2018     (H) 07/02/2018     06/22/2018    K 3 7 07/02/2018    K 4 3 06/22/2018     07/02/2018     06/22/2018    GLUCOSE 107 (H) 07/02/2018    CREATININE 2 26 (H) 07/02/2018    CREATININE 2 34 (H) 06/22/2018     Lab Results   Component Value Date    INR 1 31 06/22/2018

## 2018-07-03 ENCOUNTER — APPOINTMENT (INPATIENT)
Dept: RADIOLOGY | Facility: HOSPITAL | Age: 83
DRG: 948 | End: 2018-07-03
Payer: MEDICARE

## 2018-07-03 LAB
ANION GAP SERPL CALCULATED.3IONS-SCNC: 8 MMOL/L (ref 4–13)
BASOPHILS # BLD AUTO: 0.1 THOUSANDS/ΜL (ref 0–0.1)
BASOPHILS NFR BLD AUTO: 1 % (ref 0–2)
BUN SERPL-MCNC: 63 MG/DL (ref 7–25)
CALCIUM SERPL-MCNC: 9.1 MG/DL (ref 8.6–10.5)
CHLORIDE SERPL-SCNC: 104 MMOL/L (ref 98–107)
CO2 SERPL-SCNC: 32 MMOL/L (ref 21–31)
CREAT SERPL-MCNC: 1.92 MG/DL (ref 0.7–1.3)
EOSINOPHIL # BLD AUTO: 0.1 THOUSAND/ΜL (ref 0–0.61)
EOSINOPHIL NFR BLD AUTO: 2 % (ref 0–5)
ERYTHROCYTE [DISTWIDTH] IN BLOOD BY AUTOMATED COUNT: 16.6 % (ref 11.5–14.5)
GFR SERPL CREATININE-BSD FRML MDRD: 31 ML/MIN/1.73SQ M
GLUCOSE SERPL-MCNC: 101 MG/DL (ref 65–99)
HCT VFR BLD AUTO: 37.4 % (ref 36.5–49.3)
HGB BLD-MCNC: 12.4 G/DL (ref 14–18)
LYMPHOCYTES # BLD AUTO: 0.4 THOUSANDS/ΜL (ref 0.6–4.47)
LYMPHOCYTES NFR BLD AUTO: 5 % (ref 21–51)
MCH RBC QN AUTO: 33.4 PG (ref 26–34)
MCHC RBC AUTO-ENTMCNC: 33.1 G/DL (ref 31–37)
MCV RBC AUTO: 101 FL (ref 81–99)
MONOCYTES # BLD AUTO: 0.7 THOUSAND/ΜL (ref 0.17–1.22)
MONOCYTES NFR BLD AUTO: 9 % (ref 2–12)
NEUTROPHILS # BLD AUTO: 6.3 THOUSANDS/ΜL (ref 1.4–6.5)
NEUTS SEG NFR BLD AUTO: 84 % (ref 42–75)
NRBC BLD AUTO-RTO: 0 /100 WBCS
PLATELET # BLD AUTO: 141 THOUSANDS/UL (ref 149–390)
PMV BLD AUTO: 11 FL (ref 8.6–11.7)
POTASSIUM SERPL-SCNC: 3.7 MMOL/L (ref 3.5–5.5)
RBC # BLD AUTO: 3.71 MILLION/UL (ref 4.3–5.9)
SODIUM SERPL-SCNC: 144 MMOL/L (ref 134–143)
WBC # BLD AUTO: 7.5 THOUSAND/UL (ref 4.8–10.8)

## 2018-07-03 PROCEDURE — 97535 SELF CARE MNGMENT TRAINING: CPT

## 2018-07-03 PROCEDURE — 92526 ORAL FUNCTION THERAPY: CPT

## 2018-07-03 PROCEDURE — 92507 TX SP LANG VOICE COMM INDIV: CPT

## 2018-07-03 PROCEDURE — 71045 X-RAY EXAM CHEST 1 VIEW: CPT

## 2018-07-03 PROCEDURE — 97116 GAIT TRAINING THERAPY: CPT

## 2018-07-03 PROCEDURE — 97110 THERAPEUTIC EXERCISES: CPT

## 2018-07-03 PROCEDURE — 97530 THERAPEUTIC ACTIVITIES: CPT

## 2018-07-03 PROCEDURE — 97537 COMMUNITY/WORK REINTEGRATION: CPT

## 2018-07-03 PROCEDURE — 85025 COMPLETE CBC W/AUTO DIFF WBC: CPT | Performed by: INTERNAL MEDICINE

## 2018-07-03 PROCEDURE — 99221 1ST HOSP IP/OBS SF/LOW 40: CPT | Performed by: INTERNAL MEDICINE

## 2018-07-03 PROCEDURE — 80048 BASIC METABOLIC PNL TOTAL CA: CPT | Performed by: NURSE PRACTITIONER

## 2018-07-03 PROCEDURE — G0515 COGNITIVE SKILLS DEVELOPMENT: HCPCS

## 2018-07-03 RX ADMIN — MEGESTROL ACETATE 20 MG: 20 TABLET ORAL at 17:59

## 2018-07-03 RX ADMIN — MEGESTROL ACETATE 20 MG: 20 TABLET ORAL at 08:57

## 2018-07-03 RX ADMIN — POTASSIUM CHLORIDE 20 MEQ: 1500 TABLET, EXTENDED RELEASE ORAL at 08:43

## 2018-07-03 RX ADMIN — HEPARIN SODIUM 5000 UNITS: 5000 INJECTION INTRAVENOUS; SUBCUTANEOUS at 21:10

## 2018-07-03 RX ADMIN — FUROSEMIDE 40 MG: 10 INJECTION, SOLUTION INTRAVENOUS at 16:37

## 2018-07-03 RX ADMIN — MEGESTROL ACETATE 20 MG: 20 TABLET ORAL at 12:04

## 2018-07-03 RX ADMIN — ASPIRIN 81 MG 81 MG: 81 TABLET ORAL at 08:42

## 2018-07-03 RX ADMIN — FAMOTIDINE 20 MG: 20 TABLET, FILM COATED ORAL at 08:43

## 2018-07-03 RX ADMIN — HYDRALAZINE HYDROCHLORIDE 10 MG: 10 TABLET, FILM COATED ORAL at 18:15

## 2018-07-03 RX ADMIN — HEPARIN SODIUM 5000 UNITS: 5000 INJECTION INTRAVENOUS; SUBCUTANEOUS at 08:42

## 2018-07-03 RX ADMIN — MEGESTROL ACETATE 20 MG: 20 TABLET ORAL at 21:10

## 2018-07-03 RX ADMIN — FUROSEMIDE 40 MG: 10 INJECTION, SOLUTION INTRAVENOUS at 08:57

## 2018-07-03 NOTE — PROGRESS NOTES
Progress Note - Michael Lyons 1934, 80 y o  male MRN: [id number]    Unit/Bed#: [facility] 217-02 Encounter: [id number]    Primary Care Provider: Waylon Thomas MD   Date and time admitted to hospital: 6/25/2018  3:57 PM        * [facility] d/t acute on chronic systolic CHF with Weight gain, increased pleural effusion, KASANDRA  H/O Pacemaker placement          PT/OT/ST  Repeat CXR  IV Lasix  Cardio consult per hospitalist recommendations  Isordil/Coreg/Lasix/Apresoline  Nephro consult        COPD (chronic obstructive pulmonary disease) ([facility] [address] )   Assessment & Plan    COPD    O2        Dysphagia   Assessment & Plan    Dysphagia  Failed VFSS      ST  Modified diet              Vitals:  Temp:  [96 4 °F (35 8 °C)-96 6 °F (35 9 °C)] 96 6 °F (35 9 °C)  HR:  [76-80] 76  Resp:  [20] 20  BP: ()/(56-70) 100/62    Physical Exam:  General: alert, no apparent distress, cooperative and comfortable  HENMT: Head: Normal, normocephalic, atraumatic  Eye: Normal external eye, conjunctiva, lids   Ears: Normal external ears  Nose: Normal external nose, mucus membranes  COR: [id number]  Pulmonary: chest expansion normal, no retractions, no accessory muscle usage, no wheezes, rales, or rhonchi   Abdomen: soft, nontender, nondistended, no masses or organomegaly  Skin/Extremity: no rashes, no erythema, no peripheral edema   Neurologic: Awake alert orientedx3  Psych: normal mood, behavior, speech, dress, and thought processes  Musculoskeletal: AROM wfl all extremities  Severe generalized muscle atrophy  Patient is progressing with ADLs and functional mobility, cognition & speech  Team conference held 35 min  Greater than 50% of time spent coordinating/ counseling patient care        Current Facility-Administered Medications   Medication Dose Route Frequency Provider Last Rate Last Dose    acetaminophen (TYLENOL) tablet 650 mg  650 mg Oral 4x Daily PRN Dinah Beckman MD        aluminum-magnesium hydroxide-simethicone (MYLANTA) 200-200-20 mg/5 mL oral suspension 30 mL  30 mL Oral Q4H PRN Terrell Srinivasan MD   30 mL at 07/02/18 0954    aspirin chewable tablet 81 mg  81 mg Oral Daily Hermelinda Ray MD   81 mg at 07/03/18 4976    bisacodyl (DULCOLAX) rectal suppository 10 mg  10 mg Rectal Daily PRN Terrell Srinivasan MD        carvedilol (COREG) tablet 3 125 mg  3 125 mg Oral BID With Meals JESUS Eid   3 125 mg at 07/02/18 1609    docusate sodium (COLACE) capsule 100 mg  100 mg Oral BID Terrell Srinivasan MD   100 mg at 06/29/18 1718    famotidine (PEPCID) tablet 20 mg  20 mg Oral Daily Terrell Srinivasan MD   20 mg at 07/03/18 0843    furosemide (LASIX) injection 40 mg  40 mg Intravenous BID (diuretic) Carlyn Foster MD   40 mg at 07/03/18 0857    heparin (porcine) subcutaneous injection 5,000 Units  5,000 Units Subcutaneous Q12H [address] Terrell Srinivasan MD   5,000 Units at 07/03/18 0842    hydrALAZINE (APRESOLINE) tablet 10 mg  10 mg Oral BID JESUS Eid   10 mg at 07/02/18 1609    isosorbide dinitrate (ISORDIL) tablet 10 mg  10 mg Oral Daily JESUS Eid        meclizine (ANTIVERT) tablet 12 5 mg  12 5 mg Oral TID PRN Terrell Srinivasan MD        megestrol (MEGACE) tablet 20 mg  20 mg Oral 4x Daily Terrell Srinivasan MD   20 mg at 07/03/18 0857    ondansetron (ZOFRAN-ODT) dispersible tablet 4 mg  4 mg Oral Q6H PRN Terrell Srinivasan MD        polyethylene glycol (MIRALAX) packet 17 g  17 g Oral BID Terrell Srinivasan MD   17 g at 06/28/18 0854    potassium chloride (K-DUR,KLOR-CON) CR tablet 20 mEq  20 mEq Oral Daily Carlyn Foster MD   20 mEq at 07/03/18 0843    senna (SENOKOT) tablet 17 2 mg  2 tablet Oral Daily Terrell Srinivasan MD   17 2 mg at 06/29/18 0842        Laboratory:    Lab Results   Component Value Date    HGB 12 4 (L) 07/03/2018    HGB 15 4 06/22/2018    HCT 37 4 07/03/2018    HCT 46 2 06/22/2018    WBC 7 50 07/03/2018    WBC 8 1 06/22/2018     Lab Results   Component Value Date    BUN 63 (H) 07/03/2018    BUN 53 (H) 06/22/2018     (H) 07/03/2018     06/22/2018    K 3 7 07/03/2018    K 4 3 06/22/2018     07/03/2018     06/22/2018    GLUCOSE 101 (H) 07/03/2018    CREATININE 1 92 (H) 07/03/2018    CREATININE 2 34 (H) 06/22/2018     Lab Results   Component Value Date    INR 1 31 06/22/2018

## 2018-07-03 NOTE — NURSING NOTE
Pt had haircut scheduled and needed his money from the safe  At this time there was $349 in a wallet in the safe  Staff obtained items from safe and pt took $20 out to use towards haircut  Pt gave all $20 to the hairdresser to pay for hair cut and a tip  The rest of the money ($329) was put back into the wallet and was returned to the safe  The Nurse and CNA were in the room to witness this and signed the bag which was then put back in the safe

## 2018-07-03 NOTE — PROGRESS NOTES
PT PROGRESS NOTE:     07/03/18 1400   Pain Assessment   Pain Assessment No/denies pain   Transfer Bed/Chair/Wheelchair   Limitations Noted In Balance; Endurance   Adaptive Equipment Roller Walker   Sit to TXU Danny   Stand to Sit Supervision   Ambulation   Primary Discharge Mode of Locomotion Walk   Walk Assist Level Supervision   Assist Device Roller Walker   Distance Walked (feet) (100' x2)   Limitations Noted In Balance;Device Management; Endurance   Findings needs cues for safety and oxygen management   Walking (FIM) 2 - Patient ambulates between 50 - 149 feet regardless of assist/device/set up   Stairs   Type Stairs   # of Steps (10)   Assist Devices Bilateral Rail   Findings (unable to manage oxygen tubing; tangled around body)   Stairs (FIM) 2 - Patient goes up and down 4 - 11 stairs regardless of assist/device/setup   Therapeutic Interventions   Strengthening seated ther ex    Balance gait (level and unlevel) and transfer training   Assessment   Treatment Assessment Patient seen for ther ex, gait and transfer training with RW  Requires cues for safety and oxygen management;   Barriers to Discharge Decreased caregiver support   PT Barriers   Physical Impairment Decreased strength;Decreased range of motion;Decreased endurance; Impaired balance;Decreased mobility; Decreased safety awareness   Functional Limitation Ramp negotiation;Stair negotiation;Standing;Transfers; Walking   Plan   Treatment/Interventions Functional transfer training;LE strengthening/ROM; Elevations; Therapeutic exercise; Bed mobility;Gait training   Progress Slow progress, decreased activity tolerance   PT Therapy Minutes   PT Time In 1400   PT Time Out 1500   PT Total Time (minutes) 60   PT Mode of treatment - Individual (minutes) 60   PT Mode of treatment - Concurrent (minutes) 0   PT Mode of treatment - Group (minutes) 0   PT Mode of treatment - Co-treat (minutes) 0   PT Mode of Teatment - Total time(minutes) 60 minutes   Therapy Time missed   Time missed?  No

## 2018-07-03 NOTE — PROGRESS NOTES
07/03/18 0700   Pain Assessment   Pain Score No Pain   Eating Assessment   Swallow Precautions Yes  (Soft diet with a soft bagel requested by pt)   Eating (FIM) 7 - Patient completely independent   Grooming   Able To Comb/Brush Hair;Wash/Dry Face;Brush/Clean Teeth;Wash/Dry Hands   Limitation Noted In Safety   Grooming (FIM) 5 - Dudley sets up supplies or applies device   Bathing   Assessed Bath Style Sponge Bath   Able to Gather/Transport No   Limitations Noted in Balance; Endurance; Safety   Bathing (FIM) 5 - Dudley sets up supplies or applies device but patient completes 10/10 parts   Tub/Shower Transfer   Not Assessed Patient refusal   Dressing/Undressing Clothing   Remove UB Clothes Pullover [address] Clothes Pants   UB Dressing (FIM) 5 - Dudley sets up supplies or applies device   LB Dressing (FIM) 5 - Dudley sets up supplies or applies device   Transfer Bed/Chair/Wheelchair   Limitations Noted In Other  (Safety)   Stand Pivot Supervision   Sit to Stand Supervision   Stand to Sit Supervision   Supine to Sit Supervision   Bed, Chair, Wheelchair Transfer (FIM) 5 - Dudley sets up equipment or applies device such as orthosis/prosthesis   Toileting   Able to [address] down yes, up yes  Manage Hygiene Bladder   Limitations Noted In Balance; Safety   Toileting (FIM) 5 - Patient requires supervision/monitoring   Toilet Transfer   Surface Assessed Raised Toilet   Transfer Technique Stand Pivot   Limitations Noted In Safety   Toilet Transfer (FIM) 5 - Patient requires supervision/monitoring   Meal Prep   Meal Preparation (Able to make own bagel sitting after setup)   Additional Activities   Additional Activities Other (Comment)  (functinal mobility 75 ft S with RW)   Activity Tolerance   Activity Tolerance Patient tolerated treatment well   Assessment   Treatment Assessment Patient tolerates treatment this day with bathing completed in bathroom after requesting toileting  Supervision for all activities with S to manage O2 line and occasional cues to reach back and push up  Patient had 60 minutes concurrent treatment with roommate as OT was in the same room and pt requests assist  Patient supervised although left to manage as much for self as possible to promote independence  Problem List Decreased endurance; Impaired balance;Decreased safety awareness   Plan   Treatment/Interventions ADL retraining;Functional transfer training; Endurance training   Progress Improving as expected   Recommendation   OT Equipment ordered (shower chair, declines a commade)   OT Therapy Minutes   OT Time In 0700   OT Time Out 0800   OT Total Time (minutes) 60   OT Mode of treatment - Concurrent (minutes) (60)   Therapy Time missed   Time missed?  No

## 2018-07-03 NOTE — NURSING NOTE
Dr Ppier Wright present on the floor this evening and assessed the patient  New order for a one time IV Lasix to be given at this time and orders for new lab work in the AM and a chest x-ray in the AM   New consults called in for nephrology and cardiology as ordered  One time dose of IV lasix 40mg administered as ordered  Patient's BID Lasix order was then modified from PO route to IV route  As patient received the one time dose of IV Lasix at this time will start BID IV Lasix at next scheduled dose time  Will continue to monitor patient and follow plan of care

## 2018-07-03 NOTE — ASSESSMENT & PLAN NOTE
Debility d/t acute on chronic systolic CHF with Weight gain, increased pleural effusion, KASANDRA  H/O Pacemaker placement          PT/OT/ST  Repeat CXR  IV Lasix   Cardio consult per hospitalist recommendations  Isordil/Coreg/Lasix/Apresoline  Nephro consult

## 2018-07-03 NOTE — CONSULTS
[facility] 1934[facility] 80 [facility]  [facility] MRN: [id number]    Unit/Bed#: -02 Encounter: [id number]    Primary Care Provider: Ashok Kelsey MD   Date and time admitted to hospital: 6/25/2018  3:57 PM    HPI: Andrew Reese is a 80y o  year old male who presents with failure to thrive  I saw him during his recent hospital stay downstairs  He presented with high BNP but basically was not getting by at home  He has longstanding severe nonischemic cardiomyopathy  There is an ICD in place  We discussed turning this off but he wants it to be continued  He is now on the rehab floor and seems to be slowly improving his functional capacity  His weight has been stable  EKG:  Sinus rhythm with premature atrial beats on most recent EKG          Review of Systems: a 12 point review of systems was conducted and is negative except for as mentioned in the HPI or as below  Walking remains difficult but he is using a walker  He needs oxygen to breathe comfortably  Historical Information   Past Medical History:   Diagnosis Date    AAA [facility]ab[facility] aortic aneurysm) ([facility] )     Arthritis     CAD (coronary artery disease)     CHF (congestive heart failure) ([facility] ) 06/19/2018    CKD (chronic kidney disease), stage III     COPD (chronic obstructive pulmonary disease) (HCC)     Hypertension     Hypertensive nephrosclerosis     Ischemic cardiomyopathy     Pleural effusion due to CHF (congestive heart failure) (HCC)     Pulmonary HTN (HCC)      Past Surgical History:   Procedure Laterality Date    CARDIAC PACEMAKER PLACEMENT       History   Alcohol Use    0 6 oz/week    1 Glasses of wine per week     Comment: daily with dinner     History   Drug Use No     History   Smoking Status    Former Smoker    Packs/day: 0 00    Types: Cigarettes    Quit date: 1/1/2016   Smokeless Tobacco    Never Used       Family History:   No longer relevant      Meds/Allergies   all current active meds have been reviewed  Prescriptions Prior to Admission   Medication    albuterol (2 5 mg/3 mL) 0 083 % nebulizer solution    aspirin (ASPIRIN LOW DOSE) 81 MG tablet    carvedilol (COREG) 3 125 mg tablet    colchicine (COLCRYS) 0 6 mg tablet    furosemide (LASIX) 20 mg tablet    furosemide (LASIX) 40 mg tablet    isosorbide dinitrate (ISORDIL) 20 mg tablet    meclizine (ANTIVERT) 25 mg tablet       No Known Allergies    Objective   Vitals: Blood pressure 100/62, pulse 76, temperature (!) 96 6 °F (35 9 °C), temperature source Tympanic, resp  rate 20, height 5' 6" (1 676 m), weight 53 3 kg (117 lb 8 oz), SpO2 94 %  , Body mass index is 18 96 kg/m² , Orthostatic Blood Pressures      Most Recent Value   Blood Pressure  100/62 filed at 07/03/2018 0850   Patient Position - Orthostatic VS  Sitting filed at 07/03/2018 5686          Systolic (41SGA), ABC:862 , Min:94 , TKV:230     Diastolic (46CVE), KTX:65, Min:56, Max:70              Physical Exam:    General:  Normal appearance in no distress  Eyes:  Anicteric  Oral mucosa:  Moist   Neck:  No JV D  Carotid upstrokes are brisk without bruits  No masses  Chest:  Decreased breath sounds right base  ICD in the left subclavian region  Cardiac:  Lateral PM I   Normal S1 and S2  There is a grade 2 systolic murmur at the apex and lower left sternal border  Abdomen:  Soft and nontender  No palpable organomegaly or aortic enlargement  Extremities:  No peripheral edema  Musculoskeletal:  Symmetric  Able to exercise with normal gait  Vascular:  Femoral pulses are brisk without bruits  Popliteal  are intact bilaterally  Pedal pulses are absent   Neuro:  Grossly symmetric  Psych:  Alert and oriented x3          Lab Results:     Troponins:     BNP:   Results from last 6 Months  Lab Units 06/22/18  2105 06/07/18  1030   BNP PG/ML 2,783* 2,036*       CBC :   Results from last 7 days  Lab Units 07/03/18  0611   WBC Thousand/uL 7 50   HEMOGLOBIN g/dL 12 4*   HEMATOCRIT % 37 4 MCV fL 101*   PLATELETS Thousands/uL 141*     TSH:     CMP:   Results from last 7 days  Lab Units 07/03/18  0915 07/02/18  0535   SODIUM mmol/L 144* 145*   POTASSIUM mmol/L 3 7 3 7   CHLORIDE mmol/L 104 104   CO2 mmol/L 32* 34*   BUN mg/dL 63* 66*   CREATININE mg/dL 1 92* 2 26*   GLUCOSE RANDOM mg/dL 101* 107*   EGFR ml/min/1 73sq m 31 26     Lipid Profile:     Coags:           Consults    ICD (implantable cardioverter-defibrillator) in place   Assessment & Plan    Medtronic ICD in place  No recent shocks  Acute kidney injury superimposed on chronic kidney disease (HCC)   Assessment & Plan    Stable moderate to severe renal insufficiency  Dilated cardiomyopathy ([address] )   Assessment & Plan    Presumed nonischemic  Close eye on daily weights  ICD in place  At this point current medications should be continued  No changes in regimen at this point

## 2018-07-03 NOTE — CONSULTS
Consultation - Nephrology   Cheryle Eaton 80 y o  male MRN: [id number]  Unit/Bed#: [facility] 217-02 Encounter: [id number]      A/P:  1  KASANDRA: slight increase over baseline due to diuretic therapy  Will continue to monitor  2  CKD III: will continue to monitor   3  CHF: continue current diuretic regimen, may be able to transition to po lasix tomorrow  4  Hypertension; continue current regimen         Thank you for allowing us to participate in the care of your patient  Please feel free to contact us regarding the care of this patient, or any other questions/concerns that may be applicable  Patient Active Problem List   Diagnosis    [facility]    ICD (implantable cardioverter-defibrillator) in place    Acute on chronic renal insufficiency    [facility] kidney injury superimposed on chronic [facility] disease [facility]    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 ([facility] )    CKD (chronic kidney disease), stage III    Atrial fibrillation ([facility] )    COPD (chronic obstructive pulmonary disease) ([facility] )    TIA (transient ischemic attack)    Pulmonary HTN ([facility] )    AAA (abdominal aortic aneurysm) ([facility] )    CAD (coronary artery disease)    Hypertensive nephrosclerosis    Anorexia    Moderate protein-calorie malnutrition (HCC)    Acute on chronic systolic CHF (congestive heart failure) ([facility] )    Debility    Dysphagia       History of Present Illness   Physician Requesting Consult: Roz Betancur MD  Reason for Consult / Principal Problem: KASANDRA/CKD  Hx and PE limited by:   HPI: Cheryle Eaton is a 80y o  year old male who presents with increasing shortness of breath and feeling increased fatigue  He was given an additional dose of lasix and was feeling better, but then had an increase in his creatinine but has improved today  On physical exam he in mild respiratory distress after ambulating, states he is slightly sob, is using oxygen   Denies any chest pain, cough, nausea, vomiting, diarrhea or abdominal pain  History obtained from chart review and the patient    Review of Systems - Negative except as mentioned above in HPI, more specifics as mentioned below    Review of Systems - History obtained from chart review    Historical Information   Past Medical History:   Diagnosis Date    [facility]     Arthritis     CAD (coronary artery disease)     CH[facility]est[facility]) [facility]) 06/19/2018    CKD (chronic kidney disease), stage III     COPD (chronic obstructive pulmonary disease) ([facility])     Hypertension     Hypertensive nephrosclerosis     Ischemic cardiomyopathy     Pleural effusion due to CHF (congestive heart failure) ([facility])     Pulmonary HTN (HCC)      Past Surgical History:   Procedure Laterality Date    CARDIAC PACEMAKER PLACEMENT       Social History   History   Alcohol Use    0 6 oz/week    1 Glasses of wine per week     Comment: daily with dinner     History   Drug Use No     History   Smoking Status    Former Smoker    Packs/day: 0 00    Types: Cigarettes    Quit date: 1/1/2016   Smokeless Tobacco    Never Used     Family History   Problem Relation Age of Onset    Coronary artery disease Father        Meds/Allergies   all current active meds have been reviewed, current meds: Current Facility-Administered Medications   Medication Dose Route Frequency    acetaminophen (TYLENOL) tablet 650 mg  650 mg Oral 4x Daily PRN    aluminum-magnesium hydroxide-simethicone (MYLANTA) 200-200-20 mg/5 mL oral suspension 30 mL  30 mL Oral Q4H PRN    aspirin chewable tablet 81 mg  81 mg Oral Daily    bisacodyl (DULCOLAX) rectal suppository 10 mg  10 mg Rectal Daily PRN    carvedilol (COREG) tablet 3 125 mg  3 125 mg Oral BID With Meals    docusate sodium (COLACE) capsule 100 mg  100 mg Oral BID    famotidine (PEPCID) tablet 20 mg  20 mg Oral Daily    furosemide (LASIX) injection 40 mg  40 mg Intravenous BID (diuretic)    heparin (porcine) subcutaneous injection 5,000 Units  5,000 Units Subcutaneous Q12H [address]    hydrALAZINE (APRESOLINE) tablet 10 mg  10 mg Oral BID    isosorbide dinitrate (ISORDIL) tablet 10 mg  10 mg Oral Daily    meclizine (ANTIVERT) tablet 12 5 mg  12 5 mg Oral TID PRN    megestrol (MEGACE) tablet 20 mg  20 mg Oral 4x Daily    ondansetron (ZOFRAN-ODT) dispersible tablet 4 mg  4 mg Oral Q6H PRN    polyethylene glycol (MIRALAX) packet 17 g  17 g Oral BID    potassium chloride (K-DUR,KLOR-CON) CR tablet 20 mEq  20 mEq Oral Daily    senna (SENOKOT) tablet 17 2 mg  2 tablet Oral Daily    and PTA meds:  Prescriptions Prior to Admission   Medication    albuterol (2 5 mg/3 mL) 0 083 % nebulizer solution    aspirin (ASPIRIN LOW DOSE) 81 MG tablet    carvedilol (COREG) 3 125 mg tablet    colchicine (COLCRYS) 0 6 mg tablet    furosemide (LASIX) 20 mg tablet    furosemide (LASIX) 40 mg tablet    isosorbide dinitrate (ISORDIL) 20 mg tablet    meclizine (ANTIVERT) 25 mg tablet         No Known Allergies    Objective     Intake/Output Summary (Last 24 hours) at 07/03/18 1400  Last data filed at 07/02/18 2321   Gross per 24 hour   Intake                0 ml   Output              450 ml   Net             -450 ml       Invasive Devices:        Physical Exam      I/O last 3 completed shifts: In: 360 [P O :360]  Out: 750 [Urine:750]    Vitals:    07/03/18 0850   BP: 100/62   Pulse:    Resp:    Temp:    SpO2:        Gen: in NAD, Alert/Awake  HEENT: no sclerous icterus, MMM, neck supple  CV: +S1/S2, RRR  Lungs: decreased throughout with crackles bi basilar   Abd: +BS, soft NT/ND  Ext: all four extremities are warm  Skin: positive b/l lower ext edema +1, skin very dry   Neuro: CN II-XII intact    Current Weight: Weight - Scale: 53 3 kg (117 lb 8 oz)  First Weight: Weight - Scale: 51 8 kg (114 lb 3 oz)    Lab Results:  I have personally reviewed pertinent labs      CBC: Lab Results   Component Value Date    WBC 7 50 07/03/2018 HGB 12 4 (L) 07/03/2018    HCT 37 4 07/03/2018     (H) 07/03/2018     (L) 07/03/2018    MCH 33 4 07/03/2018    MCHC 33 1 07/03/2018    RDW 16 6 (H) 07/03/2018    MPV 11 0 07/03/2018    NRBC 0 07/03/2018     CMP: Lab Results   Component Value Date     (H) 07/03/2018    K 3 7 07/03/2018     07/03/2018    CO2 32 (H) 07/03/2018    ANIONGAP 8 07/03/2018    BUN 63 (H) 07/03/2018    CREATININE 1 92 (H) 07/03/2018    GLUCOSE 101 (H) 07/03/2018    CALCIUM 9 1 07/03/2018    EGFR 31 07/03/2018     Phosphorus: No results found for: PHOS  Magnesium: No results found for: MG  Urinalysis: No results found for: COLORU, CLARITYU, SPECGRAV, PHUR, LEUKOCYTESUR, NITRITE, PROTEINUA, GLUCOSEU, KETONESU, BILIRUBINUR, BLOODU  Ionized Calcium: No results found for: CAION  Coagulation: No results found for: PT, INR, APTT  Troponin: No results found for: TROPONINI  ABG: No results found for: PHART, WHJ7OBM, PO2ART, KNO9HMI, S9ESYWJJ, BEART, SOURCE      Results from last 7 days  Lab Units 07/03/18  0915 07/02/18  0535 07/01/18  1221   SODIUM mmol/L 144* 145* 143   POTASSIUM mmol/L 3 7 3 7 3 4*   CHLORIDE mmol/L 104 104 102   CO2 mmol/L 32* 34* 32*   BUN mg/dL 63* 66* 68*   CREATININE mg/dL 1 92* 2 26* 2 17*   CALCIUM mg/dL 9 1 9 2 9 2   GLUCOSE RANDOM mg/dL 101* 107* 179*       Radiology review:  Procedure: Xr Chest Portable    Result Date: 7/3/2018  Narrative: INDICATION:  Shortness of breath  ORDERING PROVIDER:  Kavya Ivy  TECHNIQUE:  Frontal chest was obtained at 0832 hours  COMPARISON:  7/1/18 XR  FINDINGS:  The cardiomediastinal silhouette is obscured by right greater than left fairly homogeneous perihilar, mid lung and bibasal opacities most consistent with pleural fluid and atelectasis  Central right greater than left increased pulmonary vascularity is also seen suggesting severe CHF/fluid overload with pulmonary edema  No change mildly enlarged heavily calcified aortic knob    No change left pacemaker/defibrillator battery with single contiguous appearing intraventricular lead noted with findings most suggestive cardiomegaly due to position of the inferior left midline pacemaker/defibrillator metal lead tip noted  No large gross pneumothorax suggested  No acute osseous process identified  No subdiaphragmatic free air seen grossly  Impression: Severe CHF/fluid overload with pulmonary edema and fairly large right and moderate left pleural effusions and atelectasis  Superimposed pneumonia or mass/neoplasm not excluded  Recommend follow-up chest exam as clinically indicated for clearing as clinically indicated to exclude pneumonia/neoplasm  Signed by Braeden Gu MD    Procedure: Xr Chest Portable    Result Date: 7/1/2018  Narrative: INDICATION:  Shortness of breath  ORDERING PROVIDER:  Khris Hillman  TECHNIQUE:  Frontal chest was obtained at 12:08 hours  COMPARISON:  06/26/2018 XR  FINDINGS:  The cardiomediastinal silhouette appears enlarged but stable  Diffuse pulmonary vascular congestion is unchanged  There is a left-sided pacemaker     There has been slight increase in the small left basilar effusion with worsening atelectasis  There has been moderate increase of the right pleural effusion which is now moderate in size     There is no pneumothorax  No acute osseous process  Impression: Significant increase in right pleural effusion which is now moderate in size  Slight increase in the left basilar effusion with worsening adjacent left basilar atelectasis  Unchanged diffuse pulmonary vascular congestion  Signed by Laura Toth DO        EKG, Pathology, and Other Studies: I have reviewed pertinent studies       Counseling / Coordination of Care  Total floor / unit time spent today 70 minutes  Greater than 50% of total time was spent with the patient and / or family counseling and / or coordination of care   A description of the counseling / coordination of care:    Meagan Gamboa CRNP

## 2018-07-03 NOTE — CASE MANAGEMENT
Tx team recommendations reviewed & DC planning  Contacted Pt's significant other & her daughter/emeergency contact per his request  Pt's emergency contact stated that Pt's significant other has been staying at her residence & is unclear if she will return to the home with Pt, due to conflicts that occurred prior to his admission in [facility]  Pt's emergency contact declined to transport Pt home, stating "for now I just want to stay out of it"  Pt is aware that as of DC on 7/5 that he will be home alone unless his support persons change their mind; he is refusing to consider additional rehab & wants to go home with services  Tx team recommends SLP, PT, OT, Nsg & HHA  Referalls submitted to [place] [id number] agencies & awaiting confirmation of services available  SW will continue to monitor & assist as needed with tx & dc planning  Pt has reported that he has funds for private duty caregivers & referral made to [facility]

## 2018-07-03 NOTE — PCC CARE MANAGEMENT
Tx team recommendations reviewed & DC planning  Contacted Pt's significant other & her daughter/emeergency contact per his request  Pt's emergency contact stated that Pt's significant other has been staying at her residence & is unclear if she will return to the home with Pt, due to conflicts that occurred prior to his admission in [facility]  Pt's emergency contact declined to transport Pt home, stating "for now I just want to stay out of it"  Pt is aware that as of DC on 7/5 that he will be home alone unless his support persons change their mind; he is refusing to consider additional rehab & wants to go home with services  Tx team recommends SLP, PT, OT, Nsg & HHA  Referalls submitted to [facility] agencies & awaiting confirmation of services available  SW will continue to monitor & assist as needed with tx & dc planning

## 2018-07-03 NOTE — NURSING NOTE
Patient resting comfortably in bed at this time  No signs of distress noted  Patient continues on 2L of oxygen via N/C  Patient experiences dyspnea upon exertion  Patient able to ambulate to the bathroom overnight with one assist  Call bell within reach  Will continue to monitor patient and follow plan of care

## 2018-07-03 NOTE — TEAM CONFERENCE
Acute RehabilitationTeam Conference Note  Date: 7/3/2018   Time: 11:14 AM       Patient Name:  Emigdio Lucas       Medical Record Number: [id number]   YOB: 1934  Sex: Male          Room/Bed:  [facility] 217/[facility] 217-02  Payor Info:  Payor: MEDICARE / Plan: MEDICARE A AND B / Product Type: Medicare A & B Fee for Service /      Admitting Diagnosis: Shortness of breath [R06 02]   Admit Date/Time:  6/25/2018  3:57 PM  Admission Comments: No comment available     Primary Diagnosis:  Debility  Principal Problem: Debility    Patient Active Problem List    Diagnosis Date Noted    Dysphagia 06/29/2018    Debility 06/25/2018    Dilated cardiomyopathy ([facility] ) 06/23/2018    ICD (implantable cardioverter-defibrillator) in place 06/23/2018    Acute on chronic renal insufficiency 06/23/2018    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 ([facility] ) 06/23/2018    CKD (chronic kidney disease), stage III 06/23/2018    Atrial fibrillation ([facility] ) 06/23/2018    COPD (chronic obstructive pulmonary disease) ([facility] ) 06/23/2018    TIA (transient ischemic attack) 06/23/2018    Pulmonary HTN ([facility] ) 06/23/2018    AAA (abdominal aortic aneurysm) ([facility] ) 06/23/2018    CAD (coronary artery disease) 06/23/2018    Hypertensive nephrosclerosis 06/23/2018    Anorexia 06/23/2018    Moderate protein-calorie malnutrition ([facility] ) 06/23/2018    Acute on chronic systolic CHF (congestive heart failure) ([facility] ) 06/23/2018    Acute kidney injury superimposed on chronic kidney disease ([facility] )        Physical Therapy:    Weight Bearing Status: Weight Bearing as Tolerated  Transfers: Contact Guard  Bed Mobility: Supervision  Amulation Distance (ft): 100 feet (contact guard)  Ambulation: Contact Guard  Assistive Device for Ambulation: Roller Walker  Number of Stairs: 5  Assistive Device for Stairs: Bilateral [employer]  Stair Assistance: Contact Guard  Ramp: Contact Guard  Assistive Device for Ramp: Roller Walker (contact guard)  Discharge Recommendations: Home with:  [address] with[de-identified] Family Support, Home Physical Therapy    Patient seen for IE today  Please refer to assessment for complete details  Patient supervision bed mobility, and CG for all transfers and mobility using RW  Patient limited by decreased ROM/strength, decreased balance and safety  Would benefit from continued inpatient PT to increase function, safety and independence with RW in prep for safe d/c to home  7/2/18: Patient participating in therapy but making slow progress  Patient supervision bed mobility; CG transfers, gait, carpet , ramp with RW; CG 5 steps with 2 handrails  Patient limited by decreased ROM/strength; decreased balance and safety, decreased endurance  Requires general safety cues as well as for O2 management  Would benefit from continued inpatient PT to increase function, safety, and increased independence with functional mobility in prep for safe d/c to home  Occupational Therapy:  Eating: Independent  Grooming: Supervision  Bathing: Supervision  Bathing: Supervision  Upper Body Dressing: Supervision  Lower Body Dressing: Supervision  Toileting: Supervision  Tub/Shower Transfer: Supervision  Toilet Transfer: Supervision  Cognition: Within Defined Limits  Orientation: Person, Place, Time, Situation  Discharge Recommendations: Home with:  [address] with[de-identified] Family Support       6/26/18: Patient evaluated this day  Requires assist secondary to decreased balance, safety and endurance  Patient's current level of function as folows: I eating, s/u grooming, min A UB dressing, toileting and toileting transfer, and mod A LB dressing  Pt agreeable to skilled OT services and will benefit to increase independence with daily tasks  7/3/18: Patient participates in ADLs, transfers/ mobility with management of O2 line and BUE therex   Pt is making good progress and demonstrates increased independence with daily tasks to prep for discharge to home with family support  Pt requires supervision secondary to decreased safety, endurance and balance  Recommend a tub chair for home and pt declines purchase of a commode because able to walk to bathroom  O2 in place  Pt will benefit form continued skilled OT services to increase independence with daily tasks and prep for transition to home  Speech Therapy:  Mode of Communication: Verbal  Speech/Language: Dysarthia  Cognition: Exceptions to WNL  Cognition: Decreased Attention, Decreased Safety  Orientation: Person, Place, Time, Situation  Swallowing: Exceptions to WNL  Swallowing: Oral Dysphagia, Pharyngeal Dysphagia  Diet Recommendations: Level 2/Mechanically Altered, Thin  Discharge Recommendations: Home with:  [address] with[de-identified] 24 Hour Assisteance  Patient is participating in skilled speech therapy focusing on swallow oral function and speech cognitive communication skills  Patient has made gains this treatment period in diet texture tolerance  A video swallow study was performed secondary to improved swallow and demonstration of compensatory techniques at mealtime  Patient passed the study and was recommended and ordered thin liquids  Patient is seen each day with a meal or snack to focus on memory and demonstration of swallow safety protocol  He is gaining each treatment session in recalling the techniques in a more independent fashion to decrease the level of supervision and cues  Speech is targeted via strengthening and coordination training of oral articulators as well as focus on coordination of respiration with phonation  Further work is needed in this area as patient persists with open mouth posture and weak jaw, lips, cheeks and tongue  Cognitively, patient varies from morning to evening  He presents as better focused with good recall of techniques early in the day to supervision, min A  His skills decrease later in the day and with fatigue    At times he can be argumentative when he forgets why he is being cued or instructed for safety  Nursing Notes:  Appetite: Fair  Diet Type: Dysphagia II                      Diet Patient/Family Education Complete: Yes                            Bladder: Continent     Bladder Patient/Family Education: No  Bowel: Continent     Bowel Patient/Family Education: Yes  Pain Location: Arm (right shoulder)  Pain Orientation: Right  Pain Score: 0                          Pain Patient/Family Education: Yes       Patient is alert and oriented forgetful at times  Lungs clear but diminished bilateral bases on 2 liters of oxygen via nasal cannula  Patient gets dyspnic on exertion at times  Hr regular pacemaker in place  +1 lower extremity ankle edema  Pt bilateral buttocks and bilateral heels pink/blancheable  Pt has waffle mattress in place as a preventative measure  Pt has scab on R great toe which was causing pain prior to admission  Continent of bowel and bladder  Pt is assist of one and is able to ambulate to the bathroom with supervision  Patients daily weights are being monitored and trending up  Patients Lasix order changed to IV route  Patient for lab work and repeat chest x-ray this morning  Patient requires supervision to maintain patient safety  Patient continues to participate in therapy to improve functionality to return to highest level of function  Case Management:     Discharge Planning  Living Arrangements: Spouse/significant other  Support Systems: Spouse/significant other  Assistance Needed: yes  Type of Current Residence: Private residence  Current Home Care Services: No (may require after d/c)  No notes on file    Is the patient actively participating in therapies?  yes  List any modifications to the treatment plan: na    Barriers Interventions   Decreased safety ADL, transfer, gait training   Decreased balance Therapeutic exercise, therapeutic activity   dysphagia Ground with thin liquids, ST strategies   Decreased cog ST strategies, ADL, transfer, gait training         Is the patient making expected progress toward goals? yes  List any update or changes to goals: na    Medical Goals: Patient will be able to manage medical conditions and comorbid conditions with medications and follow up upon completion of rehab program    Weekly Team Goals: Mod I self care, transfers, and mobility  Discussion: Patient making progress towards goals however due to decreased balance, endurance, and strength team feels that patient may not make mod I goals  Plan is attempt to go home alone as significant other is currently not living in the house  Plan is for home with services with PT, OT, ST and Nursing      Anticipated Discharge Date: July 5, 2018

## 2018-07-04 DIAGNOSIS — I10 ESSENTIAL HYPERTENSION: Primary | ICD-10-CM

## 2018-07-04 LAB
ANION GAP SERPL CALCULATED.3IONS-SCNC: 9 MMOL/L (ref 4–13)
BASOPHILS # BLD AUTO: 0 THOUSANDS/ΜL (ref 0–0.1)
BASOPHILS NFR BLD AUTO: 1 % (ref 0–2)
BUN SERPL-MCNC: 69 MG/DL (ref 7–25)
CALCIUM SERPL-MCNC: 9 MG/DL (ref 8.6–10.5)
CHLORIDE SERPL-SCNC: 106 MMOL/L (ref 98–107)
CO2 SERPL-SCNC: 30 MMOL/L (ref 21–31)
CREAT SERPL-MCNC: 1.78 MG/DL (ref 0.7–1.3)
CREAT UR-MCNC: 91.4 MG/DL
EOSINOPHIL # BLD AUTO: 0.1 THOUSAND/ΜL (ref 0–0.61)
EOSINOPHIL NFR BLD AUTO: 1 % (ref 0–5)
ERYTHROCYTE [DISTWIDTH] IN BLOOD BY AUTOMATED COUNT: 16.2 % (ref 11.5–14.5)
GFR SERPL CREATININE-BSD FRML MDRD: 35 ML/MIN/1.73SQ M
GLUCOSE SERPL-MCNC: 106 MG/DL (ref 65–99)
HCT VFR BLD AUTO: 39.7 % (ref 36.5–49.3)
HGB BLD-MCNC: 13.5 G/DL (ref 14–18)
LYMPHOCYTES # BLD AUTO: 0.5 THOUSANDS/ΜL (ref 0.6–4.47)
LYMPHOCYTES NFR BLD AUTO: 6 % (ref 21–51)
MCH RBC QN AUTO: 34.4 PG (ref 26–34)
MCHC RBC AUTO-ENTMCNC: 34.1 G/DL (ref 31–37)
MCV RBC AUTO: 101 FL (ref 81–99)
MONOCYTES # BLD AUTO: 0.7 THOUSAND/ΜL (ref 0.17–1.22)
MONOCYTES NFR BLD AUTO: 9 % (ref 2–12)
NEUTROPHILS # BLD AUTO: 6.2 THOUSANDS/ΜL (ref 1.4–6.5)
NEUTS SEG NFR BLD AUTO: 83 % (ref 42–75)
NRBC BLD AUTO-RTO: 0 /100 WBCS
PLATELET # BLD AUTO: 148 THOUSANDS/UL (ref 149–390)
PMV BLD AUTO: 10.8 FL (ref 8.6–11.7)
POTASSIUM SERPL-SCNC: 4.1 MMOL/L (ref 3.5–5.5)
RBC # BLD AUTO: 3.94 MILLION/UL (ref 4.3–5.9)
SODIUM SERPL-SCNC: 145 MMOL/L (ref 134–143)
UUN 24H UR-MCNC: 740 MG/DL
WBC # BLD AUTO: 7.5 THOUSAND/UL (ref 4.8–10.8)

## 2018-07-04 PROCEDURE — 85025 COMPLETE CBC W/AUTO DIFF WBC: CPT | Performed by: NURSE PRACTITIONER

## 2018-07-04 PROCEDURE — 92526 ORAL FUNCTION THERAPY: CPT

## 2018-07-04 PROCEDURE — 80048 BASIC METABOLIC PNL TOTAL CA: CPT | Performed by: NURSE PRACTITIONER

## 2018-07-04 PROCEDURE — 82570 ASSAY OF URINE CREATININE: CPT | Performed by: INTERNAL MEDICINE

## 2018-07-04 PROCEDURE — 97116 GAIT TRAINING THERAPY: CPT

## 2018-07-04 PROCEDURE — 84540 ASSAY OF URINE/UREA-N: CPT | Performed by: INTERNAL MEDICINE

## 2018-07-04 PROCEDURE — G0515 COGNITIVE SKILLS DEVELOPMENT: HCPCS

## 2018-07-04 PROCEDURE — 97537 COMMUNITY/WORK REINTEGRATION: CPT

## 2018-07-04 PROCEDURE — 97110 THERAPEUTIC EXERCISES: CPT

## 2018-07-04 PROCEDURE — 97535 SELF CARE MNGMENT TRAINING: CPT

## 2018-07-04 PROCEDURE — 92507 TX SP LANG VOICE COMM INDIV: CPT

## 2018-07-04 RX ADMIN — POTASSIUM CHLORIDE 20 MEQ: 1500 TABLET, EXTENDED RELEASE ORAL at 08:42

## 2018-07-04 RX ADMIN — MEGESTROL ACETATE 20 MG: 20 TABLET ORAL at 12:17

## 2018-07-04 RX ADMIN — MEGESTROL ACETATE 20 MG: 20 TABLET ORAL at 08:45

## 2018-07-04 RX ADMIN — FAMOTIDINE 20 MG: 20 TABLET, FILM COATED ORAL at 08:43

## 2018-07-04 RX ADMIN — FUROSEMIDE 40 MG: 10 INJECTION, SOLUTION INTRAVENOUS at 08:44

## 2018-07-04 RX ADMIN — HYDRALAZINE HYDROCHLORIDE 10 MG: 10 TABLET, FILM COATED ORAL at 17:57

## 2018-07-04 RX ADMIN — ISOSORBIDE DINITRATE 10 MG: 10 TABLET ORAL at 08:43

## 2018-07-04 RX ADMIN — MEGESTROL ACETATE 20 MG: 20 TABLET ORAL at 21:05

## 2018-07-04 RX ADMIN — HEPARIN SODIUM 5000 UNITS: 5000 INJECTION INTRAVENOUS; SUBCUTANEOUS at 08:42

## 2018-07-04 RX ADMIN — CARVEDILOL 3.12 MG: 3.12 TABLET, FILM COATED ORAL at 17:10

## 2018-07-04 RX ADMIN — HEPARIN SODIUM 5000 UNITS: 5000 INJECTION INTRAVENOUS; SUBCUTANEOUS at 21:05

## 2018-07-04 RX ADMIN — HYDRALAZINE HYDROCHLORIDE 10 MG: 10 TABLET, FILM COATED ORAL at 08:42

## 2018-07-04 RX ADMIN — ASPIRIN 81 MG 81 MG: 81 TABLET ORAL at 08:42

## 2018-07-04 RX ADMIN — MEGESTROL ACETATE 20 MG: 20 TABLET ORAL at 18:00

## 2018-07-04 RX ADMIN — FUROSEMIDE 40 MG: 10 INJECTION, SOLUTION INTRAVENOUS at 17:11

## 2018-07-04 RX ADMIN — CARVEDILOL 3.12 MG: 3.12 TABLET, FILM COATED ORAL at 08:43

## 2018-07-04 NOTE — PROGRESS NOTES
Progress Note - Darcy Pale 1934, 80 y o  male MRN: [id number]    Unit/Bed#: [facility] 217-02 Encounter: [id number]    Primary Care Provider: Zuleyka Liu MD   Date and time admitted to hospital: 6/25/2018  3:57 PM        * [facility] d/t acute on chronic systolic CHF with Weight gain, increased pleural effusion, KASANDRA  H/O Pacemaker placement          PT/OT/ST   IV Lasix  Cardio consult reviewed  No recommendations made  Isordil/Coreg/Lasix/Apresoline  Nephro consult reviewed- possible PO Lasix for discharge        Moderate protein-calorie malnutrition (HCC)   Assessment & Plan    Malnutrition    Megace        COPD (chronic obstructive pulmonary disease) ([facility])   Assessment & Plan    COPD    O2            Vitals:  Temp:  [96 °F (35 6 °C)-97 9 °F (36 6 °C)] 96 °F (35 6 °C)  HR:  [75-80] 80  Resp:  [16] 16  BP: (100-118)/(60-70) 118/70    Physical Exam:  General: alert, no apparent distress, cooperative and comfortable  HENMT: Head: Normal, normocephalic, atraumatic  Eye: Normal external eye, conjunctiva, lids   Ears: Normal external ears  Nose: Normal external nose, mucus membranes  COR: [id number]  Pulmonary: chest expansion normal, no retractions, no accessory muscle usage, no wheezes, rales, or rhonchi   Abdomen: soft, nontender, nondistended, no masses or organomegaly  Skin/Extremity: no rashes, no erythema, no peripheral edema   Neurologic: Awake alert orientedx3  Psych: normal mood, behavior, speech, dress, and thought processes  Musculoskeletal: AROM wfl all extremities  Severe generalized atrophy  Patient is progressing with ADLs and functional mobility, cognition & speech        Current Facility-Administered Medications   Medication Dose Route Frequency Provider Last Rate Last Dose    acetaminophen (TYLENOL) tablet 650 mg  650 mg Oral 4x Daily PRN Les Esposito MD        aluminum-magnesium hydroxide-simethicone (MYLANTA) 200-200-20 mg/5 mL oral suspension 30 mL 30 mL Oral Q4H PRN Karel Benson MD   30 mL at 07/02/18 0954    aspirin chewable tablet 81 mg  81 mg Oral Daily Hermelinda Ray MD   81 mg at 07/04/18 0842    bisacodyl (DULCOLAX) rectal suppository 10 mg  10 mg Rectal Daily PRN Karel Benson MD        carvedilol (COREG) tablet 3 125 mg  3 125 mg Oral BID With Meals JESUS Brewer   3 125 mg at 07/04/18 0843    docusate sodium (COLACE) capsule 100 mg  100 mg Oral BID Karel Benson MD   100 mg at 06/29/18 1718    famotidine (PEPCID) tablet 20 mg  20 mg Oral Daily Karel Benson MD   20 mg at 07/04/18 0843    furosemide (LASIX) injection 40 mg  40 mg Intravenous BID (diuretic) Mallika Hill MD   40 mg at 07/04/18 0844    heparin (porcine) subcutaneous injection 5,000 Units  5,000 Units Subcutaneous Q12H [address] Karel Benson MD   5,000 Units at 07/04/18 0842    hydrALAZINE (APRESOLINE) tablet 10 mg  10 mg Oral BID JESUS Brewer   10 mg at 07/04/18 3418    isosorbide dinitrate (ISORDIL) tablet 10 mg  10 mg Oral Daily JESUS Brewer   10 mg at 07/04/18 0843    meclizine (ANTIVERT) tablet 12 5 mg  12 5 mg Oral TID PRN Karel Benson MD        megestrol (MEGACE) tablet 20 mg  20 mg Oral 4x Daily Karel Benson MD   20 mg at 07/04/18 0845    ondansetron (ZOFRAN-ODT) dispersible tablet 4 mg  4 mg Oral Q6H PRN Karel Benson MD        polyethylene glycol (MIRALAX) packet 17 g  17 g Oral BID Karel Benson MD   17 g at 06/28/18 0854    potassium chloride (K-DUR,KLOR-CON) CR tablet 20 mEq  20 mEq Oral Daily Mallika Hill MD   20 mEq at 07/04/18 0842    senna (SENOKOT) tablet 17 2 mg  2 tablet Oral Daily Karel Benson MD   17 2 mg at 06/29/18 0842        Laboratory:    Lab Results   Component Value Date    HGB 13 5 (L) 07/04/2018    HGB 15 4 06/22/2018    HCT 39 7 07/04/2018    HCT 46 2 06/22/2018    WBC 7 50 07/04/2018    WBC 8 1 06/22/2018     Lab Results   Component Value Date    BUN 69 (H) 07/04/2018 BUN 53 (H) 06/22/2018     (H) 07/04/2018     06/22/2018    K 4 1 07/04/2018    K 4 3 06/22/2018     07/04/2018     06/22/2018    GLUCOSE 106 (H) 07/04/2018    CREATININE 1 78 (H) 07/04/2018    CREATININE 2 34 (H) 06/22/2018     Lab Results   Component Value Date    INR 1 31 06/22/2018

## 2018-07-04 NOTE — PROGRESS NOTES
PT PROGRESS NOTE:   07/04/18 0930   Pain Assessment   Pain Assessment No/denies pain   Pain Score No Pain   Restrictions/Precautions   Precautions (oxygen management)   Cognition   Overall Cognitive Status WFL   Orientation Level Oriented X4   Memory Within functional limits   Following Commands Follows multistep commands with increased time or repetition   Comments needs general cues for safety   QI: Roll Left and Right   Assistance Needed Independent   Roll Left and Right CARE Score 6   QI: Sit to Lying   Assistance Needed Independent   Sit to Lying CARE Score 6   QI: Lying to Sitting on Side of Bed   Assistance Needed Independent   Lying to Sitting on Side of Bed CARE Score 6   QI: Sit to Stand   Assistance Needed Independent   Comment RW with verbal cues   Sit to Stand CARE Score 6   Bed Mobility   Able to Roll Left to Right;Right to Left;Scoot Up;Scoot Down   Findings needs verbal cues for safety   QI: Chair/Bed-to-Chair Transfer   Assistance Needed Independent   Comment RW with verbal cues    Chair/Bed-to-Chair Transfer CARE Score 6   Transfer Bed/Chair/Wheelchair   Limitations Noted In Balance; Other  (safety)   Adaptive Equipment Roller Walker; Other  (oxygen)   Stand Pivot Modified Independent   Sit to Stand Modified Independent   Stand to Sit Modified Independent   Supine to Sit Modified Independent   Sit to Supine Modified Independent   Findings needs cues for safety and oxygen management   Bed, Chair, Wheelchair Transfer (FIM) 6 - Patient requires assistive device/extra time/safety concerns but completes independently   QI: Walk 10 Feet   Assistance Needed Independent   Comment RW with oxygen   Walk 10 Feet CARE Score 6   QI: Walk 50 Feet with Two Turns   Assistance Needed Independent   Comment RW with oxygen   Walk 50 Feet with Two Turns CARE Score 6   QI: Walk 150 Feet   Assistance Needed Independent   Comment RW with oxygen   Walk 150 Feet CARE Score 6   QI: Walking 10 Feet on Uneven Surfaces Assistance Needed Independent   Comment RW with oxygen   Walking 10 Feet on Uneven Surfaces CARE Score 6   Ambulation   Does the patient walk? 2  Yes   Primary Discharge Mode of Locomotion Walk   Walk Assist Level Modified Independent   Assist Device Roller Walker   Distance Walked (feet) 150 ft   Limitations Noted In (oxygen management; general safety)   Walking (FIM) 6 - Patient requires assistive device/extra time/safety concerns but completes independently AND distance 150 feet or more, no rest   Wheelchair mobility   QI: Does the patient use a wheelchair? 0  No   Wheelchair (FIM) 0 - Activity does not occur   QI: 1 Step (Curb)   Reason if not Attempted Activity not applicable   1 Step (Curb) CARE Score 9   QI: 4 Steps   Assistance Needed Independent   4 Steps CARE Score 6   QI: 12 Steps   Assistance Needed Independent   12 Steps CARE Score 6   Stairs   Type Stairs   # of Steps (15)   Assist Devices Bilateral Rail   Stairs (FIM) 6 - Patient requires assistive device/extra time/safety concerns but completes independently AND goes up and down full flight (12- 14 stairs)   QI: Picking Up Object   Assistance Needed Supervision   Picking Up Object CARE Score 4   Therapeutic Interventions   Balance gait and transfer training with cues for safety and oxygen management   Assessment   Treatment Assessment Patient mod I all functional mobility using RW  Needs cues  for safety and oxygen management   PT Barriers   Physical Impairment Decreased safety awareness   PT Therapy Minutes   PT Time In 0930   PT Time Out 1000   PT Total Time (minutes) 30   PT Mode of treatment - Individual (minutes) 30   PT Mode of treatment - Concurrent (minutes) 0   PT Mode of treatment - Group (minutes) 0   PT Mode of treatment - Co-treat (minutes) 0   PT Mode of Teatment - Total time(minutes) 30 minutes   Therapy Time missed   Time missed?  No

## 2018-07-04 NOTE — PROGRESS NOTES
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  4  Atrial fibrillation: appears rate controlled at present  5  Pulmonary hypertension and chronic COPD: makes diuresis more problematic  6  Failure to thrive: not eating very well      Follow up reason for today's visit: KASANDRA/CKD    Debility    Patient Active Problem List   Diagnosis    Dilated cardiomyopathy ([facility] )    ICD (implantable cardioverter-defibrillator) in place    Acute on chronic renal insufficiency    Acute kidney injury superimposed on chronic kidney disease ([facility]    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 ([facility]    CKD (chronic kidney disease), stage III    Atrial fibrillation ([facility]    COPD (chronic obstructive pulmonary disease) ([facility]    TIA (transient ischemic attack)    Pulmonary HTN ([facility]    AAA (abdominal aortic aneurysm) ([facility]    CAD (coronary artery disease)    Hypertensive nephrosclerosis    Anorexia    Moderate protein-calorie malnutrition (HCC)    Acute on chronic systolic CHF (congestive heart failure) ([facility] )    Debility    Dysphagia         Subjective:   Denies dizziness or chests pain  Has dyspnea on exertion but not at rest  Denies abdominal pain, N V D C and is urinating frequently due to diuretic therapy  Says he feels better    Objective:     Vitals: Blood pressure 118/70, pulse 80, temperature (!) 96 °F (35 6 °C), temperature source Tympanic, resp  rate 16, height 5' 6" (1 676 m), weight 54 5 kg (120 lb 1 6 oz), SpO2 97 %  ,Body mass index is 19 38 kg/m²      Weight (last 2 days)     Date/Time Weight    07/04/18 0600  54 5 (120 1)    07/03/18 0557  53 3 (117 5)    07/02/18 0600  54 7 (120 6)                Intake/Output Summary (Last 24 hours) at 07/04/18 1409  Last data filed at 07/04/18 1045   Gross per 24 hour   Intake              240 ml   Output              550 ml   Net             -310 ml     I/O last 3 completed shifts:  In: -   Out: 650 [Urine:650]         Physical Exam: /70   Pulse 80   Temp (!) 96 °F (35 6 °C) (Tympanic)   Resp 16   Ht 5' 6" (1 676 m)   Wt 54 5 kg (120 lb 1 6 oz)   SpO2 97%   BMI 19 38 kg/m²     General Appearance:    Alert, cooperative, no distress, appears stated age   Head:    Normocephalic, without obvious abnormality, atraumatic   Eyes:    Conjunctiva/corneas clear   Ears:    Normal external ears   Nose:   Nares normal, septum midline, mucosa normal, no drainage    or sinus tenderness   Throat:   Lips, mucosa, and tongue normal; teeth and gums normal   Neck:   Supple, symmetrical, trachea midline, no adenopathy;        thyroid:  No enlargement/tenderness/nodules; no carotid    bruit or JVD   Back:     Symmetric, no curvature, ROM normal, no CVA tenderness   Lungs:     Decar to auscultation bilaterally, respirations unlabored   Chest wall:    No tenderness or deformity   Heart:    Regular rate and rhythm, S1 and S2 normal, systolic murmur, no rub   or gallop   Abdomen:     Soft, non-tender, bowel sounds active   Extremities:   Extremities normal, atraumatic, no cyanosis has 1+ edema   Skin:   Skin color, texture, turgor normal, no rashes or lesions   Lymph nodes:   Cervical normal   Neurologic:   CNII-XII intact            Lab, Imaging and other studies: I have personally reviewed pertinent labs    CBC: Lab Results   Component Value Date    WBC 7 50 07/04/2018    HGB 13 5 (L) 07/04/2018    HCT 39 7 07/04/2018     (H) 07/04/2018     (L) 07/04/2018    MCH 34 4 (H) 07/04/2018    MCHC 34 1 07/04/2018    RDW 16 2 (H) 07/04/2018    MPV 10 8 07/04/2018 NRBC 0 07/04/2018     CMP: Lab Results   Component Value Date     (H) 07/04/2018    K 4 1 07/04/2018     07/04/2018    CO2 30 07/04/2018    ANIONGAP 9 07/04/2018    BUN 69 (H) 07/04/2018    CREATININE 1 78 (H) 07/04/2018    GLUCOSE 106 (H) 07/04/2018    CALCIUM 9 0 07/04/2018    EGFR 35 07/04/2018         Results from last 7 days  Lab Units 07/04/18  0524 07/03/18  0915 07/02/18  0535   SODIUM mmol/L 145* 144* 145*   POTASSIUM mmol/L 4 1 3 7 3 7   CHLORIDE mmol/L 106 104 104   CO2 mmol/L 30 32* 34*   BUN mg/dL 69* 63* 66*   CREATININE mg/dL 1 78* 1 92* 2 26*   CALCIUM mg/dL 9 0 9 1 9 2   GLUCOSE RANDOM mg/dL 106* 101* 107*         Phosphorus: No results found for: PHOS  Magnesium: No results found for: MG  Urinalysis: No results found for: COLORU, CLARITYU, SPECGRAV, PHUR, LEUKOCYTESUR, NITRITE, PROTEINUA, GLUCOSEU, KETONESU, BILIRUBINUR, BLOODU  Ionized Calcium: No results found for: CAION  Coagulation: No results found for: PT, INR, APTT  Troponin: No results found for: TROPONINI  ABG: No results found for: PHART, GQL5JEB, PO2ART, SKZ1ELM, T8VJZQCQ, BEART, SOURCE  Radiology review:     IMAGING  Procedure: Xr Chest Portable    Result Date: 7/3/2018  Narrative: INDICATION:  Shortness of breath  ORDERING PROVIDER:  Zach Tenorio  TECHNIQUE:  Frontal chest was obtained at 0832 hours  COMPARISON:  7/1/18 XR  FINDINGS:  The cardiomediastinal silhouette is obscured by right greater than left fairly homogeneous perihilar, mid lung and bibasal opacities most consistent with pleural fluid and atelectasis  Central right greater than left increased pulmonary vascularity is also seen suggesting severe CHF/fluid overload with pulmonary edema  No change mildly enlarged heavily calcified aortic knob    No change left pacemaker/defibrillator battery with single contiguous appearing intraventricular lead noted with findings most suggestive cardiomegaly due to position of the inferior left midline pacemaker/defibrillator metal lead tip noted  No large gross pneumothorax suggested  No acute osseous process identified  No subdiaphragmatic free air seen grossly  Impression: Severe CHF/fluid overload with pulmonary edema and fairly large right and moderate left pleural effusions and atelectasis  Superimposed pneumonia or mass/neoplasm not excluded  Recommend follow-up chest exam as clinically indicated for clearing as clinically indicated to exclude pneumonia/neoplasm    Signed by Clem Gottron, MD      Current Facility-Administered Medications   Medication Dose Route Frequency    acetaminophen (TYLENOL) tablet 650 mg  650 mg Oral 4x Daily PRN    aluminum-magnesium hydroxide-simethicone (MYLANTA) 200-200-20 mg/5 mL oral suspension 30 mL  30 mL Oral Q4H PRN    aspirin chewable tablet 81 mg  81 mg Oral Daily    bisacodyl (DULCOLAX) rectal suppository 10 mg  10 mg Rectal Daily PRN    carvedilol (COREG) tablet 3 125 mg  3 125 mg Oral BID With Meals    docusate sodium (COLACE) capsule 100 mg  100 mg Oral BID    famotidine (PEPCID) tablet 20 mg  20 mg Oral Daily    furosemide (LASIX) injection 40 mg  40 mg Intravenous BID (diuretic)    heparin (porcine) subcutaneous injection 5,000 Units  5,000 Units Subcutaneous Q12H [facility]    hydrALAZINE (APRESOLINE) tablet 10 mg  10 mg Oral BID    isosorbide dinitrate (ISORDIL) tablet 10 mg  10 mg Oral Daily    meclizine (ANTIVERT) tablet 12 5 mg  12 5 mg Oral TID PRN    megestrol (MEGACE) tablet 20 mg  20 mg Oral 4x Daily    ondansetron (ZOFRAN-ODT) dispersible tablet 4 mg  4 mg Oral Q6H PRN    polyethylene glycol (MIRALAX) packet 17 g  17 g Oral BID    potassium chloride (K-DUR,KLOR-CON) CR tablet 20 mEq  20 mEq Oral Daily    senna (SENOKOT) tablet 17 2 mg  2 tablet Oral Daily     Medications Discontinued During This Encounter   Medication Reason    ondansetron (ZOFRAN) injection 4 mg     megestrol (MEGACE) tablet 20 mg     potassium chloride (K-DUR,KLOR-CON) CR tablet 40 mEq     hydrALAZINE (APRESOLINE) tablet 25 mg     furosemide (LASIX) tablet 80 mg     furosemide (LASIX) tablet 40 mg     isosorbide dinitrate (ISORDIL) tablet 20 mg     furosemide (LASIX) tablet 40 mg        Gustavo Post MD

## 2018-07-04 NOTE — PROGRESS NOTES
1800 cc fluid restriction maintained  Urine specimen ordered by Dr Aldridge Flavors , staff and pt aware  Lungs decreased right lung and bibasilar  VILLA  Oxygen maintained at 2 lpm n/c  In no distress  Showered with OT this a m  Pleasant and cooperative  Reinforce med teaching and safety awareness  Safety tab maintained

## 2018-07-04 NOTE — PROGRESS NOTES
07/04/18 1330   Pain Assessment   Pain Assessment No/denies pain   Pain Score No Pain   Cognition   Overall Cognitive Status WFL   Attention Within functional limits   Orientation Level Oriented X4   Memory Within functional limits   Following Commands Follows multistep commands with increased time or repetition   Therapeutic Interventions   Strengthening Seated ther ex 30 reps   Assessment   Treatment Assessment Pt completed seated ther ex to increase ROM and strength B LE   PT Barriers   Physical Impairment Decreased safety awareness   Plan   Treatment/Interventions LE strengthening/ROM; Therapeutic exercise   Progress Progressing toward goals   Recommendation   Recommendation Home with family support   PT Therapy Minutes   PT Time In 1330   PT Time Out 1400   PT Total Time (minutes) 30   PT Mode of treatment - Individual (minutes) 30   PT Mode of treatment - Concurrent (minutes) 0   PT Mode of treatment - Group (minutes) 0   PT Mode of treatment - Co-treat (minutes) 0   PT Mode of Teatment - Total time(minutes) 30 minutes   Therapy Time missed   Time missed?  No

## 2018-07-04 NOTE — PROGRESS NOTES
07/03/18 1233   Memory Skills   Orientation Level Oriented X4   Memory (FIM) 5 - Needs cueing reminders <10%   Social Interaction (FIM) 6 - Interacts appropriately with others BUT requires extra  time   Auditory Comprehension   Comphrehends Conversation Simple   Interfering Components Attention to detail;Processing speed   EffectiveTechniques Repetition;Stressing words   Speech/Swallow Mechanism Exam   Labial Strength Reduced   Facial Strength Reduced   Lingual Strength Mild reduced   Mandible Impaired   Volitional Cough Weak   Speech/Language/Cognition Assessmetn   Treatment Assessment Speech Pathology Daily Treatment Note - Speech/Cognitive Communication Skills   Swallow Information   Current Risks for Dysphagia & Aspiration Respiratory compromise;Weak cough   Current Diet Dysphagia mechanical soft; Thin liquid   Consistencies Assessed and Performance   Oral Stage Moderate impaired   Phargngeal Stage Mild impaired   Recommendations   General Precautions Aspiration precautions;Upright as possible for all oral intake   QI: Eating   Assistance Needed Set-up / clean-up   Eating CARE Score 5   Swallow Assessment   Swallow Treatment Assessment Speech Pathology Daily Treatment Note - Swallow Oral Function   Swallow Assessment Prognosis   Prognosis Good   SLP Therapy Minutes   SLP Time In 0274   SLP Time Out 1335   SLP Total Time (minutes) 62   SLP Mode of treatment - Individual (minutes) 62   Therapy Time missed   Time missed?  No   Daily FIM Score   Problem solving (FIM) 5 - Solves basic problems 90% of time   Comprehension (FIM) 5 - Needs help/cues, repetition only RARELY ( < 10% of the time)   Expression (FIM) 5 - Needs help/cues only RARELY (< 10% of the time)   Eating (FIM) 6 - Patient requires modified diet

## 2018-07-04 NOTE — PROGRESS NOTES
07/04/18 1625   Executive Function Skills   Planning Reduced planning skills   Organization Mildly disorganized   Memory Skills   Long Term Biographical Recall WFL - Within Functional Limits   Short Term Recent Recall Mild Impairment   Memory (FIM) 4 - Recognizes/recalls/performs 75-89%   Social Interaction (FIM) 5 - Interacts appropriately with others 90% of time   Auditory Comprehension   Comphrehends Conversation Simple   Speech/Swallow Mechanism Exam   Labial Strength Reduced   Facial Strength Reduced   Lingual Strength Mild reduced   Volitional Cough Weak   Speech/Language/Cognition Assessmetn   Treatment Assessment Speech Pathology Daily Treatment Note - Speech/Cognitive Communication Skills   Consistencies Assessed and Performance   Oral Stage Mild impaired; Moderate impaired   Phargngeal Stage Mild impaired   Recommendations   Diet Solid Recommendation Level 2 Dysphagia/ mechanical soft/altered   Diet Liquid Recommendation Thin liquid   QI: Eating   Assistance Needed Set-up / clean-up   Eating CARE Score 5   Swallow Assessment   Swallow Treatment Assessment Speech Pathology Daily Treatment Note - Swallow Oral Function   Swallow Assessment Prognosis   Prognosis Fair   SLP Therapy Minutes   SLP Time In 8839   SLP Time Out 6088   SLP Total Time (minutes) 60   SLP Mode of treatment - Individual (minutes) 30   SLP Mode of treatment - Concurrent (minutes) 30   Therapy Time missed   Time missed?  No   Daily FIM Score   Problem solving (FIM) 4 - Solves basic problems 75-89% of time   Comprehension (FIM) 5 - Needs help/cues, repetition only RARELY ( < 10% of the time)   Expression (FIM) 5 - Needs help/cues only RARELY (< 10% of the time)   Eating (FIM) 5 - Patient needs help to open contianers or set up tray

## 2018-07-04 NOTE — ASSESSMENT & PLAN NOTE
Debility d/t acute on chronic systolic CHF with Weight gain, increased pleural effusion, KASANDRA  H/O Pacemaker placement          PT/OT/ST   IV Lasix  Cardio consult reviewed  No recommendations made    Isordil/Coreg/Lasix/Apresoline  Nephro consult reviewed- possible PO Lasix for discharge

## 2018-07-04 NOTE — PROGRESS NOTES
07/04/18 0715   Pain Assessment   Pain Assessment No/denies pain   Pain Score No Pain   Grooming   Able To Initiate Tasks;Comb/Brush Hair;Wash/Dry Face;Brush/Clean Teeth;Wash/Dry Hands   Grooming (FIM) 6 - Patient requires assistive device/extra time/safety concerns but completes independently   [address]   Anticipated D/C Bath Style Shower   Able to [employer] Temperature No   Able to Wash/Rinse/Dry (body part) Left Arm;Right Arm;L Upper Leg;R Upper Leg;L Lower Leg/Foot;R Lower Leg/Foot;Chest;Abdomen   Bathing (FIM) 6 - Patient requires assistive device/extra time/safety concerns but completes independently   Tub/Shower Transfer   Limitations Noted In Coordination;Problem Solving   Shower Transfer (FIM) 4 - Patient requires steadying assist or light touching   Dressing/Undressing Clothing   Remove UB Clothes Pullover Shirt   Remove LB Clothes Pants; Undergarment;Socks   [facility]; Shorts;Socks   UB Dressing (FIM) 5 - Patient requires supervision/monitoring   LB Dressing (FIM) 5 - Patient requires supervision/monitoring   Transfer Bed/Chair/Wheelchair   Supine to Sit Minimal   Bed, Chair, Wheelchair Transfer (FIM) 4 - Patient requires steadying assist or light touching   Transfers   Sit to Stand 6  Modified independent   Stand to Sit 6  Modified independent   Activity Tolerance   Activity Tolerance Patient limited by fatigue   Assessment   Treatment Assessment ( cues for safety w/tubing mgt  Fatigue increases fall risk)   Recommendation   OT Discharge Recommendation (home with support)   OT Therapy Minutes   OT Time In 0715   OT Time Out 0830   OT Total Time (minutes) 75   OT Mode of treatment - Individual (minutes) 75   OT Mode of treatment - Concurrent (minutes) 0   OT Mode of treatment - Group (minutes) 0   OT Mode of treatment - Co-treat (minutes) 0   OT Mode of Teatment - Total time(minutes) 75 minutes   Therapy Time missed   Time missed? No

## 2018-07-05 VITALS
OXYGEN SATURATION: 95 % | HEIGHT: 66 IN | SYSTOLIC BLOOD PRESSURE: 118 MMHG | HEART RATE: 70 BPM | WEIGHT: 121.9 LBS | BODY MASS INDEX: 19.59 KG/M2 | RESPIRATION RATE: 20 BRPM | TEMPERATURE: 94.8 F | DIASTOLIC BLOOD PRESSURE: 60 MMHG

## 2018-07-05 LAB
ANION GAP SERPL CALCULATED.3IONS-SCNC: 8 MMOL/L (ref 4–13)
BUN SERPL-MCNC: 81 MG/DL (ref 7–25)
CALCIUM SERPL-MCNC: 9.2 MG/DL (ref 8.6–10.5)
CHLORIDE SERPL-SCNC: 103 MMOL/L (ref 98–107)
CO2 SERPL-SCNC: 33 MMOL/L (ref 21–31)
CREAT SERPL-MCNC: 2.17 MG/DL (ref 0.7–1.3)
GFR SERPL CREATININE-BSD FRML MDRD: 27 ML/MIN/1.73SQ M
GLUCOSE SERPL-MCNC: 103 MG/DL (ref 65–99)
POTASSIUM SERPL-SCNC: 4.1 MMOL/L (ref 3.5–5.5)
SODIUM SERPL-SCNC: 144 MMOL/L (ref 134–143)

## 2018-07-05 PROCEDURE — 92526 ORAL FUNCTION THERAPY: CPT

## 2018-07-05 PROCEDURE — 97535 SELF CARE MNGMENT TRAINING: CPT

## 2018-07-05 PROCEDURE — 92507 TX SP LANG VOICE COMM INDIV: CPT

## 2018-07-05 PROCEDURE — G0515 COGNITIVE SKILLS DEVELOPMENT: HCPCS

## 2018-07-05 PROCEDURE — 80048 BASIC METABOLIC PNL TOTAL CA: CPT | Performed by: INTERNAL MEDICINE

## 2018-07-05 RX ORDER — FUROSEMIDE 80 MG
80 TABLET ORAL
Qty: 60 TABLET | Refills: 0 | Status: ON HOLD | OUTPATIENT
Start: 2018-07-05 | End: 2018-07-26

## 2018-07-05 RX ORDER — MEGESTROL ACETATE 20 MG/1
20 TABLET ORAL 4 TIMES DAILY
Qty: 30 TABLET | Refills: 0 | Status: SHIPPED | OUTPATIENT
Start: 2018-07-05

## 2018-07-05 RX ORDER — FUROSEMIDE 80 MG
80 TABLET ORAL
Status: DISCONTINUED | OUTPATIENT
Start: 2018-07-05 | End: 2018-07-05 | Stop reason: HOSPADM

## 2018-07-05 RX ORDER — POTASSIUM CHLORIDE 20 MEQ/1
20 TABLET, EXTENDED RELEASE ORAL DAILY
Qty: 30 TABLET | Refills: 0 | Status: SHIPPED | OUTPATIENT
Start: 2018-07-06

## 2018-07-05 RX ADMIN — HEPARIN SODIUM 5000 UNITS: 5000 INJECTION INTRAVENOUS; SUBCUTANEOUS at 08:04

## 2018-07-05 RX ADMIN — MEGESTROL ACETATE 20 MG: 20 TABLET ORAL at 16:52

## 2018-07-05 RX ADMIN — CARVEDILOL 3.12 MG: 3.12 TABLET, FILM COATED ORAL at 16:51

## 2018-07-05 RX ADMIN — CARVEDILOL 3.12 MG: 3.12 TABLET, FILM COATED ORAL at 08:13

## 2018-07-05 RX ADMIN — DOCUSATE SODIUM 100 MG: 100 CAPSULE, LIQUID FILLED ORAL at 08:04

## 2018-07-05 RX ADMIN — DOCUSATE SODIUM 100 MG: 100 CAPSULE, LIQUID FILLED ORAL at 16:51

## 2018-07-05 RX ADMIN — FUROSEMIDE 40 MG: 10 INJECTION, SOLUTION INTRAVENOUS at 08:04

## 2018-07-05 RX ADMIN — HYDRALAZINE HYDROCHLORIDE 10 MG: 10 TABLET, FILM COATED ORAL at 16:50

## 2018-07-05 RX ADMIN — FUROSEMIDE 80 MG: 80 TABLET ORAL at 16:51

## 2018-07-05 RX ADMIN — HYDRALAZINE HYDROCHLORIDE 10 MG: 10 TABLET, FILM COATED ORAL at 08:04

## 2018-07-05 RX ADMIN — MEGESTROL ACETATE 20 MG: 20 TABLET ORAL at 12:10

## 2018-07-05 RX ADMIN — ISOSORBIDE DINITRATE 10 MG: 10 TABLET ORAL at 08:03

## 2018-07-05 RX ADMIN — SENNOSIDES 17.2 MG: 8.6 TABLET, FILM COATED ORAL at 08:04

## 2018-07-05 RX ADMIN — POTASSIUM CHLORIDE 20 MEQ: 1500 TABLET, EXTENDED RELEASE ORAL at 08:04

## 2018-07-05 RX ADMIN — ASPIRIN 81 MG 81 MG: 81 TABLET ORAL at 08:04

## 2018-07-05 RX ADMIN — FAMOTIDINE 20 MG: 20 TABLET, FILM COATED ORAL at 08:03

## 2018-07-05 NOTE — PROGRESS NOTES
07/05/18 0830   Pain Assessment   Pain Assessment No/denies pain   Pain Score No Pain   QI: Eating   Assistance Needed Independent   Eating CARE Score 6   Eating Assessment   Eating (FIM) 7 - Patient completely independent   QI: Oral Hygiene   Assistance Needed Independent   Oral Hygiene CARE Score 6   Grooming   Able To Comb/Brush Hair;Wash/Dry Face;Brush/Clean Teeth;Wash/Dry Hands   Limitation Noted In (Endurance)   Grooming (FIM) 6 - Patient requires assistive device/extra time/safety concerns but completes independently   QI: Shower/Bathe [address] [facility] Provided by [place] No physical assistance   Shower/Bathe Self CARE Score 6   Bathing   Assessed Bath Style Sponge Bath   Anticipated D/C Bath Style Sponge Bath   Able to Wash/Rinse/Dry (body part) Left Arm;Right Arm;L Upper Leg;R Upper Leg;L Lower Leg/Foot;R Lower Leg/Foot;Chest;Abdomen   Limitations Noted in Endurance; Safety   Positioning Seated   Bathing (FIM) 6 - Patient requires assistive device/extra time/safety concerns but completes independently   QI: Upper Body Dressing   Assistance Needed Independent   Upper Body Dressing CARE Score 6   QI: Lower Body Dressing   Assistance Needed Independent   Lower Body Dressing CARE Score 6   QI: Putting On/Taking Off [address]   Putting On/Taking Off Footwear CARE Score 6   Dressing/Undressing Clothing   Remove [address] LB Clothes Pants;Socks   [address] [place] [address] Rd LB Clothes Pants;Socks   Limitations Noted In Endurance   UB Dressing (FIM) 6 - Patient requires assistive device/extra time/safety concerns but completes independently   LB Dressing (FIM) 6 - Patient requires assistive device/extra time/safety concerns but completes independently   QI: Sit to [address]   Sit to Stand CARE Score 6   QI: [facility] CARE Score 6 Toileting   Able to Pull Clothing down yes, up yes  Findings (Stands using urinal)   Toileting (FIM) 6 - Patient requires assistive device/extra time/safety concerns but completes independently   Transfers   Sit to Stand 7  Independent   Stand to Sit 7  Independent   Cognition   Arousal/Participation Responsive; Cooperative   Attention Attends with cues to redirect   Orientation Level Oriented X4   Memory Decreased short term memory;Decreased recall of recent events   Assessment   Treatment Assessment (Pt ready for D/C)   Recommendation   OT Discharge Recommendation Home OT   OT Therapy Minutes   OT Time In 0830   OT Time Out 0900   OT Total Time (minutes) 30   OT Mode of treatment - Individual (minutes) 30   OT Mode of treatment - Concurrent (minutes) 0   OT Mode of treatment - Group (minutes) 0   OT Mode of treatment - Co-treat (minutes) 0   OT Mode of Teatment - Total time(minutes) 30 minutes   Therapy Time missed   Time missed?  No

## 2018-07-05 NOTE — PROGRESS NOTES
07/05/18 0830   Self-care   Eating (FIM) 7 - Patient completely independent   Grooming (FIM) 6 - Patient requires assistive device/extra time/safety concerns but completes independently   Bathing (FIM) 6 - Patient requires assistive device/extra time/safety concerns but completes independently   UB Dressing (FIM) 6 - Patient requires assistive device/extra time/safety concerns but completes independently   LB Dressing (FIM) 6 - Patient requires assistive device/extra time/safety concerns but completes independently   Toileting (FIM) 6 - Patient requires assistive device/extra time/safety concerns but completes independently   Communication   Comprehension (FIM) 5 - Needs help/cues, repetition only RARELY ( < 10% of the time)   Expression (FIM) 5 - Needs help/cues only RARELY (< 10% of the time)   Psychosocial    Social Interaction (FIM) 5 - Needs monitoring/encouragement  to participate/interact   Cognition   Problem solving (FIM) 4 - Solves basic problems 75-89% of time   Memory (FIM) 4 - Recognizes/recalls/performs 75-89%

## 2018-07-05 NOTE — DISCHARGE INSTRUCTIONS
Heart Failure   WHAT YOU NEED TO KNOW:   What is heart failure? Heart failure (HF) is a condition that does not allow your heart to fill or pump properly  Not enough oxygen in your blood gets to your organs and tissues  HF can occur in the right side, the left side, or both lower chambers of your heart  HF is often caused by damage or injury to your heart  The damage is caused by other heart problems or high blood pressure  HF is a long-term condition that tends to get worse over time  It is important to manage your health to improve your quality of life  HF can be worsened by heavy alcohol use, smoking, diabetes that is not controlled, or obesity  What are the signs and symptoms of HF? The signs and symptoms depend on how severe your HF is  You may have any of the following:  · Difficulty breathing with activity that worsens to difficulty breathing at rest    · Shortness of breath while lying flat    · Severe shortness of breath and coughing at night that usually wakes you     · Chest pain at night    · Periods of no breathing, then breathing fast    · Fatigue or lack of energy (often worsened by physical activity)     · Swelling in your ankles, legs, or abdomen    · Fast heartbeat, purple color around your mouth and nails    · Fingers and toes cool to the touch  How is HF diagnosed? Tell your healthcare provider about your health history and the medicines you take  He or she will ask questions about your shortness of breath and other symptoms  Your healthcare provider will make a diagnosis based on your physical exam, symptoms, and tests  You may need any of the following:  · Blood tests  are used to check for any damage to your heart  Blood tests also give healthcare providers information about your kidney, liver, and thyroid function  The results can also show an infection  · An EKG  test records your heart rhythm and how fast your heart beats   It shows healthcare providers if you have heart block or have had a heart attack  · An echocardiogram  is a type of ultrasound  Sound waves are used to show the structure and function of your heart  · X-ray, CT, or MRI  pictures may be taken of your heart and lungs  The pictures may show the cause of your HF, or blood clots or fluid in your lungs  You may be given contrast liquid before a CT scan or MRI to help healthcare providers see the pictures better  Tell the healthcare provider if you have ever had an allergic reaction to contrast liquid  Do not enter the MRI room with anything metal  Metal can cause serious injury  Tell the healthcare provider if you have any metal in or on your body  How is HF treated? The goals of treatment are to manage and slow, or reverse, heart damage  Your healthcare providers will manage your other health conditions that may be causing your HF  Examples include high blood pressure and hyperlipidemia  Treatment may include the following:  · Medicines  help regulate your heart rhythm, lower your blood pressure, and get rid of extra fluids  · Cardiac rehab  is a program run by specialists who will help you safely strengthen your heart  The program includes exercise, relaxation, stress management, and heart-healthy nutrition  Healthcare providers will also make sure your medicines are helping to reduce your symptoms  · Oxygen  may help you breathe easier if your oxygen level is lower than normal  A CPAP machine may be used to keep your airway open while you sleep  · Surgery  can be done to implant a pacemaker in your chest to regulate your heart rhythm  Other types of surgery can open blocked heart vessels, replace a damaged heart valve, or remove scar tissue  What can I do to manage HF? Your quality of life may improve with treatment and the following:  · Do not smoke  Nicotine and other chemicals in cigarettes and cigars can cause lung damage and make HF difficult to manage   Ask your healthcare provider for information if you currently smoke and need help to quit  E-cigarettes or smokeless tobacco still contain nicotine  Talk to your healthcare provider before you use these products  · Do not drink alcohol or take illegal drugs  Alcohol and drugs can worsen your symptoms quickly  · Weigh yourself every morning  Use the same scale, in the same spot  Do this after you use the bathroom, but before you eat or drink anything  Wear the same type of clothing  Do not wear shoes  Record your weight each day so you will notice any sudden weight gain  Swelling and weight gain are signs of fluid retention  If you are overweight, ask how to lose weight safely  · Check your blood pressure and heart rate every day  Ask for more information about how to measure your blood pressure and heart rate correctly  Ask what these numbers should be for you  · Manage any chronic health conditions you have  These include high blood pressure, diabetes, obesity, high cholesterol, metabolic syndrome, and COPD  You will have fewer symptoms if you manage these health conditions  Follow your healthcare provider's recommendations and follow up with him or her regularly  · Eat heart-healthy foods and limit sodium (salt)  An easy way to do this is to eat more fresh fruits and vegetables and fewer canned and processed foods  Replace butter and margarine with heart-healthy oils such as olive oil and canola oil  Other heart-healthy foods include walnuts, whole-grain breads, low-fat dairy products, beans, and lean meats  Fatty fish such as salmon and tuna are also heart healthy  Ask how much salt you can eat each day  Do not use salt substitutes  · Drink liquids as directed  You may need to limit the amount of liquids you drink if you retain fluid  Ask how much liquid to drink each day and which liquids are best for you  · Stay active  If you are not active, your symptoms are likely to worsen quickly   Walking, bicycling, and other types of physical activity help maintain your strength and improve your mood  Physical activity also helps you manage your weight  Work with your healthcare provider to create an exercise plan that is right for you  · Get vaccines as directed  Get a flu shot every year  You may also need the pneumonia vaccine  The flu and pneumonia can be severe for a person who has HF  Vaccines protect you from these infections  Call 911 if:   · You have any of the following signs of a heart attack:      ¨ Squeezing, pressure, or pain in your chest that lasts longer than 5 minutes or returns    ¨ Discomfort or pain in your back, neck, jaw, stomach, or arm     ¨ Trouble breathing    ¨ Nausea or vomiting    ¨ Lightheadedness or a sudden cold sweat, especially with chest pain or trouble breathing    When should I seek immediate care? · You gain 3 or more pounds (1 4 kg) in a day, or more than your healthcare provider says you should  · Your heartbeat is fast, slow, or uneven all the time  When should I contact my healthcare provider? · You have symptoms of worsening HF:      ¨ Shortness of breath at rest, at night, or that is getting worse in any way     ¨ Weight gain of 5 or more pounds (2 2 kg) in a week     ¨ More swelling in your legs or ankles     ¨ Abdominal pain or swelling     ¨ More coughing     ¨ Feeling tired all the time    · You feel hopeless or depressed, or you have lost interest in things you used to enjoy  · You often feel worried or afraid  · You have questions or concerns about your condition or care  CARE AGREEMENT:   You have the right to help plan your care  Learn about your health condition and how it may be treated  Discuss treatment options with your caregivers to decide what care you want to receive  You always have the right to refuse treatment  The above information is an  only   It is not intended as medical advice for individual conditions or treatments  Talk to your doctor, nurse or pharmacist before following any medical regimen to see if it is safe and effective for you  © 2017 [employer] Information is for End User's use only and may not be sold, redistributed or otherwise used for commercial purposes  All illustrations and images included in CareNotes® are the copyrighted property of [employer]  or Esau Henning  Megestrol Acetate (By mouth)   Megestrol Acetate (me-JANETH-trol AS-e-high)  Megestrol tablets treat breast and uterine cancer  Megace® ES liquid is used to increase appetite and prevent weight loss in patients with AIDS  Brand Name(s): Megace, Megace ES   There may be other brand names for this medicine  When This Medicine Should Not Be Used: You should not use this medicine if you have had an allergic reaction to megestrol  Do not use this medicine if you are pregnant, or if you think you might be pregnant  How to Use This Medicine:   Tablet, Liquid  · Your doctor will tell you how much medicine to use  Do not use more than directed  · For cancer treatment, you may need to take this medicine for at least 2 months before it starts to work  · Measure the oral liquid medicine with a marked measuring spoon, oral syringe, or medicine cup  Shake well before using  If a dose is missed:   · Take a dose as soon as you remember  If it is almost time for your next dose, wait until then and take a regular dose  Do not take extra medicine to make up for a missed dose  How to Store and Dispose of This Medicine:   · Store the medicine in a closed container at room temperature, away from heat, moisture, and direct light  · Ask your pharmacist, doctor, or health caregiver about the best way to dispose of any outdated medicine or medicine no longer needed  · Keep all medicine out of the reach of children  Never share your medicine with anyone    Drugs and Foods to Avoid:      Ask your doctor or pharmacist before using any other medicine, including over-the-counter medicines, vitamins, and herbal products  Warnings While Using This Medicine:   · It is not safe to take this medicine during pregnancy  It could harm an unborn baby  Tell your doctor right away if you become pregnant  · Make sure your doctor knows if you are breastfeeding, or if you have diabetes, Cushing's syndrome, kidney disease, or a history of blood clots  · Using this medicine for a long time may increase your risk of some kinds of cancer  Talk to your doctor about this risk  · This medicine may cause weight gain as a result of an increased appetite  · Your doctor will check your progress and the effects of this medicine at regular visits  Keep all appointments  Possible Side Effects While Using This Medicine:   Call your doctor right away if you notice any of these side effects:  · Allergic reaction: Itching or hives, swelling in your face or hands, swelling or tingling in your mouth or throat, chest tightness, trouble breathing  · Chest pain, shortness of breath, or coughing up blood  · Extreme nausea, vomiting, dizziness, or weakness  · Increased thirst, increase in how much or how often you urinate  · Numbness or weakness in your arm or leg, or on one side of your body  · Pain in your lower leg (calf)  · Rapid weight gain  · Sudden or severe headache, problems with vision, speech, or walking  · Swelling in your hands, ankles, or feet  · Unusual bleeding, bruising, or weakness  If you notice these less serious side effects, talk with your doctor:   · Bleeding between menstrual periods  · Hair loss  · Hot flashes, sweating  · Mood changes  · Pain in your hand or wrist   · Rash  · Trouble having or maintaining an erection  · Trouble sleeping  If you notice other side effects that you think are caused by this medicine, tell your doctor  Call your doctor for medical advice about side effects   You may report side effects to FDA at 1-800-FDA-1088  © 2017 [employer] Information is for End User's use only and may not be sold, redistributed or otherwise used for commercial purposes  The above information is an  only  It is not intended as medical advice for individual conditions or treatments  Talk to your doctor, nurse or pharmacist before following any medical regimen to see if it is safe and effective for you  Furosemide (By mouth)   Furosemide (srol-IK-vs-mide)  Treats fluid retention (edema) and high blood pressure  This medicine is a diuretic (water pill)  Brand Name(s): Active-Medicated Specimen Collection Kit, Diuscreen Multi-Drug Medicated Test Kit, Lasix, RX-Specimen Collection Kit, Specimen Collection Kit   There may be other brand names for this medicine  When This Medicine Should Not Be Used: This medicine is not right for everyone  Do not use it if you had an allergic reaction to furosemide  How to Use This Medicine:   Liquid, Tablet  · Take your medicine as directed  Your dose may need to be changed several times to find what works best for you  · You may take this medicine with food if it upsets your stomach  · Oral liquid: Measure the oral liquid medicine with a marked measuring spoon, oral syringe, or medicine cup  · Tablet: Swallow the tablet whole  Do not crush, break, or chew it  · Missed dose: Take a dose as soon as you remember  If it is almost time for your next dose, wait until then and take a regular dose  Do not take extra medicine to make up for a missed dose  · Store the medicine in a closed container at room temperature, away from heat, moisture, and direct light  Drugs and Foods to Avoid:   Ask your doctor or pharmacist before using any other medicine, including over-the-counter medicines, vitamins, and herbal products  · Some medicines can affect how furosemide works   Tell your doctor if you are also using any of the following:  ¨ Cisplatin, cyclosporine, digoxin, ethacrynic acid, licorice, lithium, methotrexate, or phenytoin  ¨ Adrenocorticotropic hormone (ACTH)  ¨ Laxative  ¨ Medicine to treat an infection  ¨ NSAID pain or arthritis medicine (including aspirin, diclofenac, ibuprofen, indomethacin, naproxen)  ¨ Other blood pressure medicines  ¨ Steroid medicine (including dexamethasone, hydrocortisone, methylprednisolone, prednisolone, prednisone)  ¨ Thyroid medicine  · If you also take sucralfate, allow at least 2 hours between the time you take furosemide and the time you take sucralfate  · Alcohol, narcotic pain medicine, or sleeping pills may cause you to feel more lightheaded, dizzy, or faint when used with this medicine  Warnings While Using This Medicine:   · Tell your doctor if you are pregnant or breastfeeding, or if you have kidney disease, liver disease (including cirrhosis), diabetes, gout, low blood pressure, lupus, an enlarged prostate, trouble urinating, or an allergy to sulfa drugs  Tell your doctor if you are on a low-salt diet  · This medicine may cause the following problems:   ¨ Low levels of minerals in your blood, such as potassium and sodium  ¨ Blood sugar level changes  ¨ Hearing problems  · Make sure any doctor or dentist who treats you knows that you are using this medicine  · This medicine could lower your blood pressure too much, especially when you first use it or if you are dehydrated  Stand or sit up slowly if you feel lightheaded or dizzy  · This medicine may make your skin more sensitive to sunlight  Wear sunscreen  Do not use sunlamps or tanning beds  · Your doctor will do lab tests at regular visits to check on the effects of this medicine  Keep all appointments  · Keep all medicine out of the reach of children  Never share your medicine with anyone    Possible Side Effects While Using This Medicine:   Call your doctor right away if you notice any of these side effects:  · Allergic reaction: Itching or hives, swelling in your face or hands, swelling or tingling in your mouth or throat, chest tightness, trouble breathing  · Blistering, peeling, red skin rash  · Confusion, weakness, muscle twitching  · Dry mouth, increased thirst, muscle cramps, uneven heartbeat  · Sudden and severe stomach pain, nausea, vomiting, fever, lightheadedness  · Hearing loss, ringing in the ears  · Lightheadedness, dizziness, fainting  · Severe diarrhea  · Unusual bleeding or bruising  · Yellow skin or eyes  If you notice these less serious side effects, talk with your doctor:   · Loss of appetite, stomach cramps  If you notice other side effects that you think are caused by this medicine, tell your doctor  Call your doctor for medical advice about side effects  You may report side effects to FDA at 9-335-FDA-0748  © 2017 [address] [employer] Information is for End User's use only and may not be sold, redistributed or otherwise used for commercial purposes  The above information is an  only  It is not intended as medical advice for individual conditions or treatments  Talk to your doctor, nurse or pharmacist before following any medical regimen to see if it is safe and effective for you

## 2018-07-05 NOTE — DISCHARGE SUMMARY
Discharge Summary - Keyla Shirley 80 y o  male MRN: [id number]  Unit/Bed#: [facility] 217-02 Encounter: [id number]    Admission Date: 6/25/18  Admission Orders     Ordered        06/25/18 1717  Admit Patient to  Once               Discharge Date: 7/5/18    Rehabilitation Diagnosis: Debility    Etiologic Diagnosis: Debility    HPI: Sheela Guzman is a 80 y o  male with a h/o chronic systolic chf, poorly compliant who presented to the ER  C/o progressively worsening SOB and weakness  He had not taken his Lasix and was dx with acute on chronic chf  He had undergone thoracocentesis for bilateral pleural effusions in Sept 2017  Patient had been medically managed, is stable but significantly debilitated  He also is quite malnourished  He was admitted for inptient rehab  He was placed on 1800 cc/d fluid restriction for weight gain d/t CHF & A/CKD  He was treated with IV lasix and is discharged on po Lasix per nephrologist   Pt participated in PT/OT/ST and is now stable for DC home  Significant Findings, Care, Treatment and Services Provided: PT/OT/ST    Complications: none    Functional Status :Functional Status Prior to Admission: I ADLS and mobility  Functional Status on Admission: I eating, S grooming,UE dressing, MCA bathing,LE dressing,Toileting,transfers  Amb  80ft RW S  Rehab goals were Mod I in ADLs and mobility  He reached his goals  He passed VFSS      Physical Exam: /68   Pulse 75   Temp (!) 94 8 °F (34 9 °C) (Tympanic)   Resp 20   Ht 5' 6" (1 676 m)   Wt 55 3 kg (121 lb 14 4 oz)   SpO2 95%   BMI 19 68 kg/m²   /68   Pulse 75   Temp (!) 94 8 °F (34 9 °C) (Tympanic)   Resp 20   Ht 5' 6" (1 676 m)   Wt 55 3 kg (121 lb 14 4 oz)   SpO2 95%   BMI 19 68 kg/m²     General Appearance:    Alert, cooperative, no distress, appears stated age   Head:    Normocephalic, without obvious abnormality, atraumatic   Eyes:    PERRL, conjunctiva/corneas clear, EOM's intact, fundi     benign, both eyes Ears:    Normal TM's and external ear canals, both ears   Nose:   Nares normal, septum midline, mucosa normal, no drainage    or sinus tenderness   Throat:   Lips, mucosa, and tongue normal; teeth and gums normal   Neck:   Supple, symmetrical, trachea midline, no adenopathy;        thyroid:  No enlargement/tenderness/nodules; no carotid    bruit or JVD   Back:     Symmetric, no curvature, ROM normal, no CVA tenderness   Lungs:     Clear to auscultation bilaterally, respirations unlabored   Chest wall:    No tenderness or deformity   Heart:    Regular rate and rhythm, S1 and S2 normal, no murmur, rub   or gallop   Abdomen:     Soft, non-tender, bowel sounds active all four quadrants,     no masses, no organomegaly   Genitalia:    Normal male without lesion, discharge or tenderness   Rectal:    Normal tone, normal prostate, no masses or tenderness;    guaiac negative stool   Extremities:   Extremities normal, atraumatic, no cyanosis or edema   Pulses:   2+ and symmetric all extremities   Skin:   Skin color, texture, turgor normal, no rashes or lesions   Lymph nodes:   Cervical, supraclavicular, and axillary nodes normal   Neurologic:   CNII-XII intact  Normal strength, sensation and reflexes       Throughout                        Musculoskeletal: AROM AFL  Sever generalized muscle atrophy  Discharge Diagnosis: Improved debility and appetite    Resolved Problems  Date Reviewed: 7/4/2018    None          Discharge Medications:   Current Discharge Medication List      START taking these medications    Details   megestrol (MEGACE) 20 mg tablet Take 1 tablet (20 mg total) by mouth 4 (four) times a day  Qty: 30 tablet, Refills: 0    Associated Diagnoses: Moderate protein-calorie malnutrition (HCC)      potassium chloride (K-DUR,KLOR-CON) 20 mEq tablet Take 1 tablet (20 mEq total) by mouth daily  Qty: 30 tablet, Refills: 0    Associated Diagnoses: Dilated cardiomyopathy ([facility] [address] );  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 ([facility] [address] )             Condition at Discharge: good         Discharge instructions/Information to patient and family:   See after visit summary for information provided to patient and family  Provisions for Follow-Up Care:  See after visit summary for information related to follow-up care and any pertinent home health orders  Disposition: Home    Planned Readmission: No    Discharge Statement   I spent 45 minutes discharging the patient  This time was spent on the day of discharge  I had direct contact with the patient on the day of discharge  Additional documentation is required if more than 30 minutes were spent on discharge  Discharge Medications:  See after visit summary for reconciled discharge medications provided to patient and family

## 2018-07-05 NOTE — OCCUPATIONAL THERAPY NOTE
Discharge Summary   Pt participates in ADLs, transfers/mobility and BUE therex  Pt is currently MOD I with basic self care tasks only and S with shower transfer/ showering  Recommend extra support at home for meal prep and homemaking because currently supervision  Able to manage OT line for short distances in room although fatigues quickly requiring rest breaks and pacing  Discharge to home planned 7/6/18 with goals met and an in home agency to assist with transition to home  RW and shower chair in place at home

## 2018-07-05 NOTE — PHYSICAL THERAPY NOTE
Attempted multiple times to have pt participate in therapy he stated I'm going home today and I don't need therapy  Educated the pt the importance of therapy and still refused  Pt reached all of his goals yesterday in therapy

## 2018-07-05 NOTE — PROGRESS NOTES
Progress Note - Nephrology   Neptali Youssef 80 y o  male MRN: [id number]  Unit/Bed#: [facility] 217-02 Encounter: [id number]    A/P:  1  KASANDRA: possibly due to cardiorenal syndrome  2  CHF: as per most recent chest xray does remain fluid overloaded,and will require close out patient monitoring and continued diuresis  3 Hypertensive heart disease with CKD  4  Atrial fibrillation is controlled at this time  5  Pulmonary hypertension and COPD         Follow up reason for today's visit: KASANDRA/CKD    Debility    Patient Active Problem List   Diagnosis    Dilated cardiomyopathy ([facility] )    ICD (implantable cardioverter-defibrillator) in place    Acute on chronic renal insufficiency    Acute kidney injury superimposed on chronic kidney disease ([facility] )    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 ([facility] )    CKD (chronic kidney disease), stage III    Atrial fibrillation ([facility] )    COPD (chronic obstructive pulmonary disease) ([facility]    TIA (transient ischemic attack)    Pulmonary HTN ([facility]    AAA (abdominal aortic aneurysm) ([facility] )    CAD (coronary artery disease)    Hypertensive nephrosclerosis    Anorexia    Moderate protein-calorie malnutrition (HCC)    Acute on chronic systolic CHF (congestive heart failure) ([facility] )    Debility    Dysphagia         Subjective:   Patient is evaluated today working with ot, he is slated for discharge home later today  He remains feeling sob, and using his oxygen  He is denying any chest pain, or palpitations  Does state positive fatigue  Denies any n/v or abdominal pain  Objective:     Vitals: Blood pressure 112/68, pulse 75, temperature (!) 94 8 °F (34 9 °C), temperature source Tympanic, resp  rate 20, height 5' 6" (1 676 m), weight 55 3 kg (121 lb 14 4 oz), SpO2 95 %  ,Body mass index is 19 68 kg/m²      Weight (last 2 days)     Date/Time   Weight    07/05/18 0600  55 3 (121 9)    07/04/18 1757  57 8 (127 4)    07/04/18 1401  55 1 (121 56)    07/04/18 0600  54 5 (120 1)    07/03/18 0557  53 3 (117 5)                Intake/Output Summary (Last 24 hours) at 07/05/18 0932  Last data filed at 07/05/18 0825   Gross per 24 hour   Intake              350 ml   Output              200 ml   Net              150 ml     I/O last 3 completed shifts: In: 240 [P O :240]  Out: 650 [Urine:650]         Physical Exam: /68   Pulse 75   Temp (!) 94 8 °F (34 9 °C) (Tympanic)   Resp 20   Ht 5' 6" (1 676 m)   Wt 55 3 kg (121 lb 14 4 oz)   SpO2 95%   BMI 19 68 kg/m²     General Appearance:    Alert, cooperative, no distress, appears stated age   Head:    Normocephalic, without obvious abnormality, atraumatic   Eyes:    Conjunctiva/corneas clear   Ears:    Normal external ears   Nose:   Nares normal, septum midline, mucosa normal, no drainage    or sinus tenderness   Throat:   Lips, mucosa, and tongue normal; teeth and gums normal   Neck:   Supple, symmetrical, trachea midline, no adenopathy;        thyroid:  No enlargement/tenderness/nodules; no carotid    bruit or JVD   Back:     Symmetric, no curvature, ROM normal, no CVA tenderness   Lungs:     Decreased throughout auscultation bilaterally, respirations unlabored   Chest wall:    No tenderness or deformity   Heart:    Regular rate and rhythm, S1 and S2 normal, no murmur, rub   or gallop   Abdomen:     Soft, non-tender, bowel sounds active   Extremities:   Extremities normal, atraumatic, no cyanosis or edema   Skin:   Skin color, texture, turgor normal, no rashes or lesions   Lymph nodes:   Cervical normal   Neurologic:   CNII-XII intact            Lab, Imaging and other studies: I have personally reviewed pertinent labs    CBC: No results found for: WBC, HGB, HCT, MCV, PLT, ADJUSTEDWBC, MCH, MCHC, RDW, MPV, NRBC  CMP: Lab Results   Component Value Date     (H) 07/05/2018    K 4 1 07/05/2018     07/05/2018    CO2 33 (H) 07/05/2018    ANIONGAP 8 07/05/2018    BUN 81 (H) 07/05/2018    CREATININE 2 17 (H) 07/05/2018 GLUCOSE 103 (H) 07/05/2018    CALCIUM 9 2 07/05/2018    EGFR 27 07/05/2018         Results from last 7 days  Lab Units 07/05/18  0512 07/04/18  0524 07/03/18  0915   SODIUM mmol/L 144* 145* 144*   POTASSIUM mmol/L 4 1 4 1 3 7   CHLORIDE mmol/L 103 106 104   CO2 mmol/L 33* 30 32*   BUN mg/dL 81* 69* 63*   CREATININE mg/dL 2 17* 1 78* 1 92*   CALCIUM mg/dL 9 2 9 0 9 1   GLUCOSE RANDOM mg/dL 103* 106* 101*         Phosphorus: No results found for: PHOS  Magnesium: No results found for: MG  Urinalysis: No results found for: COLORU, CLARITYU, SPECGRAV, PHUR, LEUKOCYTESUR, NITRITE, PROTEINUA, GLUCOSEU, KETONESU, BILIRUBINUR, BLOODU  Ionized Calcium: No results found for: CAION  Coagulation: No results found for: PT, INR, APTT  Troponin: No results found for: TROPONINI  ABG: No results found for: PHART, KSU4MLL, PO2ART, UDS9FFR, I5YUVDIP, BEART, SOURCE  Radiology review:     IMAGING  Procedure: Xr Chest Portable    Result Date: 7/3/2018  Narrative: INDICATION:  Shortness of breath  ORDERING PROVIDER:  Frank Gómez  TECHNIQUE:  Frontal chest was obtained at 0832 hours  COMPARISON:  7/1/18 XR  FINDINGS:  The cardiomediastinal silhouette is obscured by right greater than left fairly homogeneous perihilar, mid lung and bibasal opacities most consistent with pleural fluid and atelectasis  Central right greater than left increased pulmonary vascularity is also seen suggesting severe CHF/fluid overload with pulmonary edema  No change mildly enlarged heavily calcified aortic knob  No change left pacemaker/defibrillator battery with single contiguous appearing intraventricular lead noted with findings most suggestive cardiomegaly due to position of the inferior left midline pacemaker/defibrillator metal lead tip noted  No large gross pneumothorax suggested  No acute osseous process identified  No subdiaphragmatic free air seen grossly        Impression: Severe CHF/fluid overload with pulmonary edema and fairly large right and moderate left pleural effusions and atelectasis  Superimposed pneumonia or mass/neoplasm not excluded  Recommend follow-up chest exam as clinically indicated for clearing as clinically indicated to exclude pneumonia/neoplasm    Signed by Mark Montoya MD      Current Facility-Administered Medications   Medication Dose Route Frequency    acetaminophen (TYLENOL) tablet 650 mg  650 mg Oral 4x Daily PRN    aluminum-magnesium hydroxide-simethicone (MYLANTA) 200-200-20 mg/5 mL oral suspension 30 mL  30 mL Oral Q4H PRN    aspirin chewable tablet 81 mg  81 mg Oral Daily    bisacodyl (DULCOLAX) rectal suppository 10 mg  10 mg Rectal Daily PRN    carvedilol (COREG) tablet 3 125 mg  3 125 mg Oral BID With Meals    docusate sodium (COLACE) capsule 100 mg  100 mg Oral BID    famotidine (PEPCID) tablet 20 mg  20 mg Oral Daily    furosemide (LASIX) injection 40 mg  40 mg Intravenous BID (diuretic)    heparin (porcine) subcutaneous injection 5,000 Units  5,000 Units Subcutaneous Q12H [address]    hydrALAZINE (APRESOLINE) tablet 10 mg  10 mg Oral BID    isosorbide dinitrate (ISORDIL) tablet 10 mg  10 mg Oral Daily    meclizine (ANTIVERT) tablet 12 5 mg  12 5 mg Oral TID PRN    megestrol (MEGACE) tablet 20 mg  20 mg Oral 4x Daily    ondansetron (ZOFRAN-ODT) dispersible tablet 4 mg  4 mg Oral Q6H PRN    polyethylene glycol (MIRALAX) packet 17 g  17 g Oral BID    potassium chloride (K-DUR,KLOR-CON) CR tablet 20 mEq  20 mEq Oral Daily    senna (SENOKOT) tablet 17 2 mg  2 tablet Oral Daily     Medications Discontinued During This Encounter   Medication Reason    ondansetron (ZOFRAN) injection 4 mg     megestrol (MEGACE) tablet 20 mg     potassium chloride (K-DUR,KLOR-CON) CR tablet 40 mEq     hydrALAZINE (APRESOLINE) tablet 25 mg     furosemide (LASIX) tablet 80 mg     furosemide (LASIX) tablet 40 mg     isosorbide dinitrate (ISORDIL) tablet 20 mg     furosemide (LASIX) tablet 40 mg        JESUS Colvin

## 2018-07-05 NOTE — PLAN OF CARE
DISCHARGE PLANNING     Discharge to home or other facility with appropriate resources Adequate for Discharge        INFECTION - ADULT     Absence or prevention of progression during hospitalization Adequate for Discharge     Absence of fever/infection during neutropenic period Adequate for Discharge        Nutrition/Hydration-ADULT     Nutrient/Hydration intake appropriate for improving, restoring or maintaining nutritional needs Adequate for Discharge        PAIN - ADULT     Verbalizes/displays adequate comfort level or baseline comfort level Adequate for Discharge        Potential for Falls     Patient will remain free of falls Adequate for Discharge        Prexisting or High Potential for Compromised Skin Integrity     Skin integrity is maintained or improved Adequate for Discharge        SAFETY ADULT     Maintain or return to baseline ADL function Adequate for Discharge     Maintain or return mobility status to optimal level Adequate for Discharge

## 2018-07-05 NOTE — PROGRESS NOTES
07/05/18 1415   Executive Function Skills   Insight WFL; Mild insight   Impulsive Mildly impulsive   Task Initiation WFL   Flexibility of Thought [facility]   Planning Reduced planning skills  (benefits from cues with complex situations)   Processing Speed Delayed   Memory Skills   Orientation Level Oriented X4   Memory (FIM) 5 - [place] cues patient   Social Interaction (FIM) 6 - Interacts appropriately with others BUT requires extra  time   Auditory Comprehension   Comphrehends Conversation Simple   Interfering Components Attention - sustained;Processing speed   EffectiveTechniques Repetition;Stressing words   Speech/Language/Cognition Assessmetn   Treatment Assessment Speech Pathology Daily Treatment Note - Speech/Cognitive Communication Skillsaily Treatment Note - Speech/Cognitive Communication Skills   SLP Therapy Minutes   SLP Time In 9833   SLP Time Out 1446   SLP Total Time (minutes) 31   SLP Mode of treatment - Individual (minutes) 31   Therapy Time missed   Time missed?  No   Daily FIM Score   Problem solving (FIM) 5 - Solves complex problems But requires cues from helper   Comprehension (FIM) 5 - Understands basic directions and conversation   Expression (FIM) 5 - Needs help/cues only RARELY (< 10% of the time)

## 2018-07-05 NOTE — SPEECH THERAPY NOTE
Speech Pathology Discharge Summary    Patient has been seen for skilled speech services focusing on swallow oral function and speech cognitive communication skills  Patient has made gains in management and tolerance of ground minced solids and thin liquids  Patient is now able to verbalize and demonstrate safe swallow techniques learned in the therapeutic setting  He is stable on his diet texture without overt s/s aspiration  Speech is essentially clear, despite ongoing mild open mouth posture  He is able to convey wants and needs and has a working memory for scheduling appointments and remembering daily tasks and people  Cognitively patient presents at a supervision to mod I level for reasoning his level of assistance and problem solving safe solutions which takes into account his need to ask for help in complex situations  Discharge plan is to the home setting where supervision is available by his significant other and her daughter  Home based skilled ST is indicated for further upgrade of diet once patient has his partial dentures in place and ensure adequate diet texture modification and following of techniques in the home environment in the home setting

## 2018-07-05 NOTE — PROGRESS NOTES
Per Dr Ana María Wright patient is to have meclizine as needed  Patient explained same by this nurse

## 2018-07-05 NOTE — PHYSICAL THERAPY NOTE
PT DISCHARGE SUMMARY:      Patient has met max benefit for impatient PT  Patient mod I bed mobility; mod I transfers with RW; mod I ambulation with RW; mod I carpet with RW; mod I steps with 2 handrails  Patient needs occasional cues for oxygen management  For discharge to home with continued PT services 7/5/18  Patient being transported home via wheelchair van

## 2018-07-05 NOTE — CASE MANAGEMENT
Nsg staff reported that Pt can not be off of oxygen for the ride home  BLS unable to be arranged  Spoke with Quincy López in [id number] supervisor; Respiratory approved for Pt to leave with [facility]; once home Pt has his own oxygen supplies  Pt's family was unable to bring in his oxygen supplies  But Pt's emergency contact stated that Pt called & apologized to his significant other & she will be returning to the home with him

## 2018-07-05 NOTE — DISCHARGE INSTR - OTHER ORDERS
Eat your fruits and vegetables and drink your fluids to prevent constipation  If unable to have a BM consider over the counter MOM, senekot, colace, or fleet enema  If unable to have a BM in 3-5 days notify physician or go to the emergency room

## 2018-07-05 NOTE — DISCHARGE SUMMARY
Discharge Summary - Kanchan MarquesReedley 80 y o  male MRN: [id number]  Unit/Bed#: [facility] 217-02 Encounter: [id number]    Admission Date: 6/25/18  Admission Orders     Ordered        06/25/18 1717  Admit Patient to  Once               Discharge Date: 7/5/18    Rehabilitation Diagnosis: Debility d/t CHF    Etiologic Diagnosis: Debility Cardiac    HPI:  Andrew Viera is a 80 y o  male with a h/o chronic systolic chf, poorly compliant who presented to the ER  C/o progressively worsening SOB and weakness  He had not taken his Lasix and was dx with acute on chronic chf  He had undergone thoracocentesis for bilateral pleural effusions in Sept 2017  Patient had been medically managed, is stable but significantly debilitated  He also is quite malnourished  He was  admitted for inptient rehab, participated in PT/OT/ST and is stable for dc to home  Significant Findings, Care, Treatment and Services Provided: PT/OT/ST/VFSS    Complications: none    Functional Status :Functional Status Prior to Admission: I ADLS and mobility  Functional Status on Admission: I eating, S grooming,UE dressing, MCA bathing,LE dressing,Toileting,transfers  Amb  80ft RW S  Rehab goals were mod I for ADLs & mobility  He passed a VFSS and is tolerating mech soft, thin liquids  He reached his goals  Physical Exam: /68   Pulse 75   Temp (!) 94 8 °F (34 9 °C) (Tympanic)   Resp 20   Ht 5' 6" (1 676 m)   Wt 55 3 kg (121 lb 14 4 oz)   SpO2 95%   BMI 19 68 kg/m²   /68   Pulse 75   Temp (!) 94 8 °F (34 9 °C) (Tympanic)   Resp 20   Ht 5' 6" (1 676 m)   Wt 55 3 kg (121 lb 14 4 oz)   SpO2 95%   BMI 19 68 kg/m²     General Appearance:    Alert, cooperative, no distress, appears stated age               HENMT: Head: Normal, normocephalic, atraumatic  Eye: Normal external eye, conjunctiva, lids   Ears: Normal external ears    Nose: Normal external nose, mucus membranes  COR: [id number]  Pulmonary: chest expansion normal, no retractions, no accessory muscle usage, no wheezes, rales, or rhonchi   Abdomen: soft, nontender, nondistended, no masses or organomegaly  Skin/Extremity: no rashes, no erythema, no peripheral edema   Neurologic: Awake alert orientedx3  Psych: normal mood, behavior, speech, dress, and thought processes  Musculoskeletal: AROM wfl all extremities  Severe generalized atrophy                                                            Discharge Diagnosis: Improved debility    Resolved Problems  Date Reviewed: 7/4/2018    None          Discharge Medications:   Current Discharge Medication List          Condition at Discharge: good         Discharge instructions/Information to patient and family:   See after visit summary for information provided to patient and family  Provisions for Follow-Up Care:  See after visit summary for information related to follow-up care and any pertinent home health orders  Disposition: Home    Planned Readmission: No    Discharge Statement   I spent 48 minutes discharging the patient  This time was spent on the day of discharge  I had direct contact with the patient on the day of discharge  Additional documentation is required if more than 30 minutes were spent on discharge  Discharge Medications:  See after visit summary for reconciled discharge medications provided to patient and family

## 2018-07-05 NOTE — NURSING NOTE
Patient resting comfortably in bed at this time  No signs of distress noted  Patient remains forgetful at times bed alarm in place for patient safety  Patient was able to ambulate to the bathroom with one assist for supervision overnight  Patient remains on 2L of oxygen via N/C  Call bell within reach  Will continue to monitor patient and follow plan of care

## 2018-07-05 NOTE — PROGRESS NOTES
07/05/18 1200   Speech/Swallow Mechanism Exam   Labial Strength WFL   Lingual Strength Mild reduced   Dentition Nearly edentulous   Volitional Cough (essentialy adequate)   Volitional Swallow (essentially adequate with current diet texture )   Swallow Information   Current Diet Dysphagia mechanical soft; Thin liquid   Baseline Diet Dyphagia advanced   Consistencies Assessed and Performance   Oral Stage WFL; With limited texture   Oral Stage Comment (stable on mechanical soft solids)   Phargngeal Stage WFL; With limited amount   Pharyngeal Stage Comment (Essentially WFL using compensatory techniques )   Recommendations   Diet Solid Recommendation Level 2 Dysphagia/ mechanical soft/altered   General Precautions Aspiration precautions;Upright as possible for all oral intake   Compensatory Swallowing Strategies Alternate solids and liquids;Swallow 2 times per bite/sip   Swallow Assessment   Swallow Treatment Assessment Speech Pathology Daily Treatment Note - Swallow Oral Function   SLP Therapy Minutes   SLP Time In 1200   SLP Time Out 1230   SLP Total Time (minutes) 30   SLP Mode of treatment - Group (minutes) 30   Daily FIM Score   Eating (FIM) 7 - Patient completely independent

## 2018-07-05 NOTE — NURSING NOTE
Patient given all discharge instructions  All medications, follow up appointments, and instructions gone over  Patient educated on importance of fluid restriction and maintaining weight  All questions answered  Wheel chair Patricia Luke here to  patient  02 tank sent with patient per respiratory and set at 2 liters  Interdisciplinary team assessed patient and determined safest means of transportation is via wheel chair van  All belongings with patient

## 2018-07-05 NOTE — CASE MANAGEMENT
Spoke with Pt regarding DC  Discussed concerns related to the uncertainty with his family's involvement in his care upon DC  He refuses to consider [facility] or SNF even temporarily  Pt insistent that he is going to his home  Pt to be [address] home today, being transported by [facility], which tx team has determined to be a safe mode of transport  He is aware that he is responsible for the cost of transport  [facility] will porovide PT, OT, Nsg, SLP, HHA starting tomorrow  Private duty care will be provided by Gail Juan ([phone]), 4 hours daily  Spoke with emergency contact, Doni Romo, who stated that she believes that Pt's significant other will return to the residence "if he apologizes to her"  She asked that this worker ask Pt to call her

## 2018-07-12 RX ORDER — FUROSEMIDE 80 MG
TABLET ORAL
Qty: 30 TABLET | Refills: 0 | Status: ON HOLD | OUTPATIENT
Start: 2018-07-12 | End: 2018-07-21 | Stop reason: CLARIF

## 2018-07-17 RX ORDER — HYDRALAZINE HYDROCHLORIDE 25 MG/1
TABLET, FILM COATED ORAL
Qty: 60 TABLET | Refills: 5 | Status: SHIPPED | OUTPATIENT
Start: 2018-07-17 | End: 2018-07-26 | Stop reason: HOSPADM

## 2018-07-20 ENCOUNTER — HOSPITAL ENCOUNTER (INPATIENT)
Facility: HOSPITAL | Age: 83
LOS: 6 days | Discharge: NURSING, REHAB OR LONG-TERM CARE | DRG: 871 | End: 2018-07-26
Attending: EMERGENCY MEDICINE | Admitting: INTERNAL MEDICINE
Payer: MEDICARE

## 2018-07-20 ENCOUNTER — APPOINTMENT (EMERGENCY)
Dept: RADIOLOGY | Facility: HOSPITAL | Age: 83
DRG: 871 | End: 2018-07-20
Payer: MEDICARE

## 2018-07-20 ENCOUNTER — APPOINTMENT (EMERGENCY)
Dept: CT IMAGING | Facility: HOSPITAL | Age: 83
DRG: 871 | End: 2018-07-20
Payer: MEDICARE

## 2018-07-20 DIAGNOSIS — A41.9 SEPSIS (HCC): ICD-10-CM

## 2018-07-20 DIAGNOSIS — N17.9 ACUTE KIDNEY INJURY (HCC): ICD-10-CM

## 2018-07-20 DIAGNOSIS — Z78.9 DNI (DO NOT INTUBATE): ICD-10-CM

## 2018-07-20 DIAGNOSIS — I13.0 HYPERTENSIVE HEART AND KIDNEY DISEASE WITH HF AND WITH CKD STAGE III (HCC): ICD-10-CM

## 2018-07-20 DIAGNOSIS — Z66 DNR (DO NOT RESUSCITATE): ICD-10-CM

## 2018-07-20 DIAGNOSIS — L89.301: ICD-10-CM

## 2018-07-20 DIAGNOSIS — R62.7 FAILURE TO THRIVE IN ADULT: ICD-10-CM

## 2018-07-20 DIAGNOSIS — H54.7 VISUAL LOSS: Primary | ICD-10-CM

## 2018-07-20 DIAGNOSIS — I42.0 DILATED CARDIOMYOPATHY (HCC): ICD-10-CM

## 2018-07-20 DIAGNOSIS — I95.9 HYPOTENSION: ICD-10-CM

## 2018-07-20 DIAGNOSIS — N18.30 HYPERTENSIVE HEART AND KIDNEY DISEASE WITH HF AND WITH CKD STAGE III (HCC): ICD-10-CM

## 2018-07-20 DIAGNOSIS — E87.6 HYPOKALEMIA: ICD-10-CM

## 2018-07-20 PROBLEM — H54.3 VISION LOSS, BILATERAL: Status: ACTIVE | Noted: 2018-07-20

## 2018-07-20 LAB
ABO GROUP BLD: NORMAL
ALBUMIN SERPL BCP-MCNC: 2.6 G/DL (ref 3.5–5)
ALP SERPL-CCNC: 59 U/L (ref 46–116)
ALT SERPL W P-5'-P-CCNC: 11 U/L (ref 12–78)
ANION GAP BLD CALC-SCNC: 15 MMOL/L (ref 4–13)
ANION GAP SERPL CALCULATED.3IONS-SCNC: 7 MMOL/L (ref 4–13)
APTT PPP: 31 SECONDS (ref 24–36)
AST SERPL W P-5'-P-CCNC: 22 U/L (ref 5–45)
ATRIAL RATE: 107 BPM
BASE EX.OXY STD BLDV CALC-SCNC: 53.2 % (ref 60–80)
BASE EXCESS BLDV CALC-SCNC: 9.3 MMOL/L
BASOPHILS # BLD MANUAL: 0 THOUSAND/UL (ref 0–0.1)
BASOPHILS NFR MAR MANUAL: 0 % (ref 0–1)
BILIRUB SERPL-MCNC: 2.1 MG/DL (ref 0.2–1)
BLD GP AB SCN SERPL QL: NEGATIVE
BUN BLD-MCNC: 49 MG/DL (ref 5–25)
BUN SERPL-MCNC: 56 MG/DL (ref 5–25)
CA-I BLD-SCNC: 0.7 MMOL/L (ref 1.12–1.32)
CALCIUM SERPL-MCNC: 9 MG/DL (ref 8.3–10.1)
CHLORIDE BLD-SCNC: 109 MMOL/L (ref 100–108)
CHLORIDE SERPL-SCNC: 103 MMOL/L (ref 100–108)
CO2 SERPL-SCNC: 36 MMOL/L (ref 21–32)
CREAT BLD-MCNC: 1.4 MG/DL (ref 0.6–1.3)
CREAT SERPL-MCNC: 2.19 MG/DL (ref 0.6–1.3)
EOSINOPHIL # BLD MANUAL: 0 THOUSAND/UL (ref 0–0.4)
EOSINOPHIL NFR BLD MANUAL: 0 % (ref 0–6)
ERYTHROCYTE [DISTWIDTH] IN BLOOD BY AUTOMATED COUNT: 15.8 % (ref 11.6–15.1)
ETHANOL SERPL-MCNC: <3 MG/DL (ref 0–3)
GFR SERPL CREATININE-BSD FRML MDRD: 27 ML/MIN/1.73SQ M
GFR SERPL CREATININE-BSD FRML MDRD: 46 ML/MIN/1.73SQ M
GLUCOSE SERPL-MCNC: 148 MG/DL (ref 65–140)
GLUCOSE SERPL-MCNC: 54 MG/DL (ref 65–140)
GLUCOSE SERPL-MCNC: 58 MG/DL (ref 65–140)
HCO3 BLDV-SCNC: 36.1 MMOL/L (ref 24–30)
HCT VFR BLD AUTO: 51.6 % (ref 36.5–49.3)
HCT VFR BLD CALC: 36 % (ref 36.5–49.3)
HGB BLD-MCNC: 16.6 G/DL (ref 12–17)
HGB BLDA-MCNC: 12.2 G/DL (ref 12–17)
INR PPP: 1.49 (ref 0.86–1.17)
LACTATE SERPL-SCNC: 1.7 MMOL/L (ref 0.5–2)
LYMPHOCYTES # BLD AUTO: 0 % (ref 14–44)
LYMPHOCYTES # BLD AUTO: 0 THOUSAND/UL (ref 0.6–4.47)
MAGNESIUM SERPL-MCNC: 2.2 MG/DL (ref 1.6–2.6)
MCH RBC QN AUTO: 32.7 PG (ref 26.8–34.3)
MCHC RBC AUTO-ENTMCNC: 32.2 G/DL (ref 31.4–37.4)
MCV RBC AUTO: 102 FL (ref 82–98)
MONOCYTES # BLD AUTO: 0.54 THOUSAND/UL (ref 0–1.22)
MONOCYTES NFR BLD: 2 % (ref 4–12)
NEUTROPHILS # BLD MANUAL: 26.16 THOUSAND/UL (ref 1.85–7.62)
NEUTS BAND NFR BLD MANUAL: 11 % (ref 0–8)
NEUTS SEG NFR BLD AUTO: 86 % (ref 43–75)
NRBC BLD AUTO-RTO: 0 /100 WBCS
O2 CT BLDV-SCNC: 13 ML/DL
PCO2 BLD: 26 MMOL/L (ref 21–32)
PCO2 BLDV: 55.6 MM HG (ref 42–50)
PH BLDV: 7.43 [PH] (ref 7.3–7.4)
PLATELET # BLD AUTO: 127 THOUSANDS/UL (ref 149–390)
PLATELET BLD QL SMEAR: ABNORMAL
PMV BLD AUTO: 13 FL (ref 8.9–12.7)
PO2 BLDV: 30.2 MM HG (ref 35–45)
POTASSIUM BLD-SCNC: 2.6 MMOL/L (ref 3.5–5.3)
POTASSIUM SERPL-SCNC: 3.7 MMOL/L (ref 3.5–5.3)
PROT SERPL-MCNC: 7.2 G/DL (ref 6.4–8.2)
PROTHROMBIN TIME: 17.8 SECONDS (ref 11.8–14.2)
QRS AXIS: -51 DEGREES
QRSD INTERVAL: 138 MS
QT INTERVAL: 346 MS
QTC INTERVAL: 446 MS
RBC # BLD AUTO: 5.07 MILLION/UL (ref 3.88–5.62)
RBC MORPH BLD: NORMAL
RH BLD: POSITIVE
SODIUM BLD-SCNC: 146 MMOL/L (ref 136–145)
SODIUM SERPL-SCNC: 146 MMOL/L (ref 136–145)
SPECIMEN EXPIRATION DATE: NORMAL
SPECIMEN SOURCE: ABNORMAL
T WAVE AXIS: 121 DEGREES
TOTAL CELLS COUNTED SPEC: 100
URATE SERPL-MCNC: 13.9 MG/DL (ref 4.2–8)
VARIANT LYMPHS # BLD AUTO: 1 %
VENTRICULAR RATE: 100 BPM
WBC # BLD AUTO: 26.97 THOUSAND/UL (ref 4.31–10.16)

## 2018-07-20 PROCEDURE — 99222 1ST HOSP IP/OBS MODERATE 55: CPT | Performed by: INTERNAL MEDICINE

## 2018-07-20 PROCEDURE — 80047 BASIC METABLC PNL IONIZED CA: CPT

## 2018-07-20 PROCEDURE — 84550 ASSAY OF BLOOD/URIC ACID: CPT | Performed by: INTERNAL MEDICINE

## 2018-07-20 PROCEDURE — 82948 REAGENT STRIP/BLOOD GLUCOSE: CPT

## 2018-07-20 PROCEDURE — 93005 ELECTROCARDIOGRAM TRACING: CPT

## 2018-07-20 PROCEDURE — 83735 ASSAY OF MAGNESIUM: CPT | Performed by: EMERGENCY MEDICINE

## 2018-07-20 PROCEDURE — 82805 BLOOD GASES W/O2 SATURATION: CPT | Performed by: EMERGENCY MEDICINE

## 2018-07-20 PROCEDURE — 85610 PROTHROMBIN TIME: CPT | Performed by: EMERGENCY MEDICINE

## 2018-07-20 PROCEDURE — 71045 X-RAY EXAM CHEST 1 VIEW: CPT

## 2018-07-20 PROCEDURE — 80053 COMPREHEN METABOLIC PANEL: CPT | Performed by: EMERGENCY MEDICINE

## 2018-07-20 PROCEDURE — 36415 COLL VENOUS BLD VENIPUNCTURE: CPT | Performed by: EMERGENCY MEDICINE

## 2018-07-20 PROCEDURE — 85007 BL SMEAR W/DIFF WBC COUNT: CPT | Performed by: EMERGENCY MEDICINE

## 2018-07-20 PROCEDURE — 96374 THER/PROPH/DIAG INJ IV PUSH: CPT

## 2018-07-20 PROCEDURE — 85027 COMPLETE CBC AUTOMATED: CPT | Performed by: EMERGENCY MEDICINE

## 2018-07-20 PROCEDURE — 96375 TX/PRO/DX INJ NEW DRUG ADDON: CPT

## 2018-07-20 PROCEDURE — 80320 DRUG SCREEN QUANTALCOHOLS: CPT | Performed by: EMERGENCY MEDICINE

## 2018-07-20 PROCEDURE — 83605 ASSAY OF LACTIC ACID: CPT | Performed by: EMERGENCY MEDICINE

## 2018-07-20 PROCEDURE — 93010 ELECTROCARDIOGRAM REPORT: CPT | Performed by: INTERNAL MEDICINE

## 2018-07-20 PROCEDURE — 85730 THROMBOPLASTIN TIME PARTIAL: CPT | Performed by: EMERGENCY MEDICINE

## 2018-07-20 PROCEDURE — 86900 BLOOD TYPING SEROLOGIC ABO: CPT | Performed by: EMERGENCY MEDICINE

## 2018-07-20 PROCEDURE — 86850 RBC ANTIBODY SCREEN: CPT | Performed by: EMERGENCY MEDICINE

## 2018-07-20 PROCEDURE — 86901 BLOOD TYPING SEROLOGIC RH(D): CPT | Performed by: EMERGENCY MEDICINE

## 2018-07-20 PROCEDURE — 99285 EMERGENCY DEPT VISIT HI MDM: CPT

## 2018-07-20 PROCEDURE — 85014 HEMATOCRIT: CPT

## 2018-07-20 PROCEDURE — 87040 BLOOD CULTURE FOR BACTERIA: CPT | Performed by: EMERGENCY MEDICINE

## 2018-07-20 PROCEDURE — 70450 CT HEAD/BRAIN W/O DYE: CPT

## 2018-07-20 RX ORDER — POTASSIUM CHLORIDE 29.8 MG/ML
40 INJECTION INTRAVENOUS ONCE
Status: DISCONTINUED | OUTPATIENT
Start: 2018-07-20 | End: 2018-07-20

## 2018-07-20 RX ORDER — ASPIRIN 81 MG/1
81 TABLET, CHEWABLE ORAL ONCE
Status: DISCONTINUED | OUTPATIENT
Start: 2018-07-20 | End: 2018-07-26 | Stop reason: HOSPADM

## 2018-07-20 RX ORDER — FENTANYL CITRATE 50 UG/ML
INJECTION, SOLUTION INTRAMUSCULAR; INTRAVENOUS
Status: COMPLETED
Start: 2018-07-20 | End: 2018-07-20

## 2018-07-20 RX ORDER — DEXTROSE MONOHYDRATE 25 G/50ML
INJECTION, SOLUTION INTRAVENOUS
Status: DISPENSED
Start: 2018-07-20 | End: 2018-07-21

## 2018-07-20 RX ORDER — SODIUM CHLORIDE 9 MG/ML
75 INJECTION, SOLUTION INTRAVENOUS CONTINUOUS
Status: DISCONTINUED | OUTPATIENT
Start: 2018-07-20 | End: 2018-07-21

## 2018-07-20 RX ORDER — ACETAMINOPHEN 325 MG/1
650 TABLET ORAL EVERY 6 HOURS PRN
Status: DISCONTINUED | OUTPATIENT
Start: 2018-07-20 | End: 2018-07-26 | Stop reason: HOSPADM

## 2018-07-20 RX ORDER — POTASSIUM CHLORIDE 20 MEQ/1
20 TABLET, EXTENDED RELEASE ORAL DAILY
Status: DISCONTINUED | OUTPATIENT
Start: 2018-07-20 | End: 2018-07-20

## 2018-07-20 RX ORDER — FENTANYL CITRATE 50 UG/ML
25 INJECTION, SOLUTION INTRAMUSCULAR; INTRAVENOUS ONCE
Status: COMPLETED | OUTPATIENT
Start: 2018-07-20 | End: 2018-07-20

## 2018-07-20 RX ORDER — FENTANYL CITRATE 50 UG/ML
25 INJECTION, SOLUTION INTRAMUSCULAR; INTRAVENOUS
Status: DISCONTINUED | OUTPATIENT
Start: 2018-07-20 | End: 2018-07-21

## 2018-07-20 RX ORDER — DEXTROSE MONOHYDRATE 25 G/50ML
25 INJECTION, SOLUTION INTRAVENOUS ONCE
Status: COMPLETED | OUTPATIENT
Start: 2018-07-20 | End: 2018-07-20

## 2018-07-20 RX ORDER — FUROSEMIDE 80 MG
80 TABLET ORAL
Status: DISCONTINUED | OUTPATIENT
Start: 2018-07-20 | End: 2018-07-21

## 2018-07-20 RX ORDER — MECLIZINE HYDROCHLORIDE 25 MG/1
25 TABLET ORAL 3 TIMES DAILY
Status: DISCONTINUED | OUTPATIENT
Start: 2018-07-20 | End: 2018-07-24

## 2018-07-20 RX ORDER — POTASSIUM CHLORIDE 14.9 MG/ML
20 INJECTION INTRAVENOUS
Status: DISCONTINUED | OUTPATIENT
Start: 2018-07-20 | End: 2018-07-20

## 2018-07-20 RX ORDER — ASPIRIN 325 MG
325 TABLET ORAL ONCE
Status: DISCONTINUED | OUTPATIENT
Start: 2018-07-20 | End: 2018-07-26 | Stop reason: HOSPADM

## 2018-07-20 RX ORDER — PRAVASTATIN SODIUM 40 MG
40 TABLET ORAL
Status: DISCONTINUED | OUTPATIENT
Start: 2018-07-20 | End: 2018-07-26 | Stop reason: HOSPADM

## 2018-07-20 RX ORDER — VANCOMYCIN HYDROCHLORIDE 500 MG/100ML
500 INJECTION, SOLUTION INTRAVENOUS EVERY 24 HOURS
Status: DISCONTINUED | OUTPATIENT
Start: 2018-07-21 | End: 2018-07-23

## 2018-07-20 RX ORDER — MEGESTROL ACETATE 40 MG/1
20 TABLET ORAL 4 TIMES DAILY
Status: DISCONTINUED | OUTPATIENT
Start: 2018-07-20 | End: 2018-07-26 | Stop reason: HOSPADM

## 2018-07-20 RX ADMIN — FENTANYL CITRATE 25 MCG: 50 INJECTION, SOLUTION INTRAMUSCULAR; INTRAVENOUS at 15:12

## 2018-07-20 RX ADMIN — ENOXAPARIN SODIUM 30 MG: 30 INJECTION SUBCUTANEOUS at 16:53

## 2018-07-20 RX ADMIN — DEXTROSE MONOHYDRATE 50 ML: 25 INJECTION, SOLUTION INTRAVENOUS at 14:18

## 2018-07-20 RX ADMIN — SODIUM CHLORIDE 75 ML/HR: 0.9 INJECTION, SOLUTION INTRAVENOUS at 16:14

## 2018-07-20 RX ADMIN — VANCOMYCIN HYDROCHLORIDE 750 MG: 750 INJECTION, SOLUTION INTRAVENOUS at 15:29

## 2018-07-20 RX ADMIN — CEFEPIME HYDROCHLORIDE 1000 MG: 1 INJECTION, SOLUTION INTRAVENOUS at 15:10

## 2018-07-20 RX ADMIN — FENTANYL CITRATE 25 MCG: 50 INJECTION, SOLUTION INTRAMUSCULAR; INTRAVENOUS at 14:20

## 2018-07-20 RX ADMIN — SODIUM CHLORIDE 500 ML: 0.9 INJECTION, SOLUTION INTRAVENOUS at 15:10

## 2018-07-20 RX ADMIN — CEFEPIME HYDROCHLORIDE 1000 MG: 1 INJECTION, SOLUTION INTRAVENOUS at 16:53

## 2018-07-20 NOTE — ED PROVIDER NOTES
History  Chief Complaint   Patient presents with    Loss of Vision     Patient stated that he woke up this AM and he was not able to see  Patient told EMS that "he was ready to go"     Sudden onset visual loss both eyes this morning  Exact time of onset unclear  addnl hx per caregiver  Pt c/o generalized weakness, blurry vision, and "feel like i'm going to die" which started sometime since he went to bed last night  Complete hx limited due to medical urgency  Prior to Admission Medications   Prescriptions Last Dose Informant Patient Reported?  Taking?   aspirin (ASPIRIN LOW DOSE) 81 MG tablet   Yes No   Sig: Take 1 tablet by mouth daily   carvedilol (COREG) 3 125 mg tablet   Yes No   Sig: Take 3 125 mg by mouth 2 (two) times a day with meals   colchicine (COLCRYS) 0 6 mg tablet   Yes No   Sig: Take 0 6 mg by mouth as needed Take 2 tablets at onset of symptoms and then 1 tablet 1 hour later   furosemide (LASIX) 80 mg tablet   No No   Sig: TAKE 1 TABLET EVERY DAY HOLD FOR WEIGHT LESS THAN 120   furosemide (LASIX) 80 mg tablet   No No   Sig: Take 1 tablet (80 mg total) by mouth 2 (two) times a day   hydrALAZINE (APRESOLINE) 25 mg tablet   No No   Sig: TAKE 1 TABLET TWICE DAILY HOLD IF SBP <100   isosorbide dinitrate (ISORDIL) 20 mg tablet   Yes No   Sig: Take 20 mg by mouth daily at bedtime   meclizine (ANTIVERT) 25 mg tablet   Yes No   Sig: Take 25 mg by mouth 3 (three) times a day   megestrol (MEGACE) 20 mg tablet   No No   Sig: Take 1 tablet (20 mg total) by mouth 4 (four) times a day   potassium chloride (K-DUR,KLOR-CON) 20 mEq tablet   No No   Sig: Take 1 tablet (20 mEq total) by mouth daily      Facility-Administered Medications: None       Past Medical History:   Diagnosis Date    [facility])     Arthritis     CAD (coronary artery disease)     CHF (congestive heart failure) ([facility]) 06/19/2018    CKD (chronic kidney disease), stage III     COPD (chronic obstructive pulmonary disease) ([facility] [address] )     Hypertension     Hypertensive nephrosclerosis     Ischemic cardiomyopathy     Pleural effusion due to CHF (congestive heart failure) (HCC)     Pulmonary HTN (HCC)        Past Surgical History:   Procedure Laterality Date    CARDIAC PACEMAKER PLACEMENT         Family History   Problem Relation Age of Onset    Coronary artery disease Father      I have reviewed and agree with the history as documented  Social History   Substance Use Topics    Smoking status: Former Smoker     Packs/day: 0 00     Types: Cigarettes     Quit date: 1/1/2016    Smokeless tobacco: Never Used    Alcohol use 0 6 oz/week     1 Glasses of wine per week      Comment: daily with dinner        Review of Systems   Unable to perform ROS: Acuity of condition       Physical Exam  Physical Exam   Constitutional: No distress  Malnourished, cachectic, tachypneic   HENT:   Head: Normocephalic and atraumatic  Eyes: EOM are normal  Pupils are equal, round, and reactive to light  Neck: Normal range of motion  Neck supple  No JVD present  Cardiovascular: Regular rhythm  Tachycardic, regular rhythm   Pulmonary/Chest: No stridor  No respiratory distress  He has rales  He exhibits no tenderness  Tachypneic, increased WOB, speaking only short sentences   Abdominal: Soft    scaphoid   Musculoskeletal: He exhibits no edema or deformity  Neurological: He is alert  No sensory deficit  He exhibits normal muscle tone  Speech clear, follows commands, moves all extremities equally   Skin: Skin is warm and dry  Capillary refill takes 2 to 3 seconds  He is not diaphoretic  Nursing note and vitals reviewed        Vital Signs  ED Triage Vitals [07/20/18 1346]   Temperature Pulse Resp Blood Pressure SpO2   98 3 °F (36 8 °C) 78 -- (!) 87/48 --      Temp Source Heart Rate Source Patient Position - Orthostatic VS BP Location FiO2 (%)   Axillary -- Lying Right arm --      Pain Score       --           Vitals:    07/20/18 1346   BP: (!) 87/48   Pulse: 78   Patient Position - Orthostatic VS: Lying       Visual Acuity      ED Medications  Medications   fentanyl citrate (PF) 100 MCG/2ML 25 mcg (not administered)   fentanyl citrate (PF) 100 MCG/2ML 25 mcg (25 mcg Intravenous Given 7/20/18 1420)   sodium chloride 0 9 % bolus 500 mL (not administered)   dextrose 50 % IV solution **AcuDose Override Pull** (  Not Given 7/20/18 1437)   cefepime (MAXIPIME) IVPB (premix) 1,000 mg (not administered)   vancomycin (VANCOCIN) IVPB (premix) 750 mg (not administered)   potassium chloride 20 mEq IVPB (premix) (not administered)   aspirin chewable tablet 81 mg (not administered)   sodium chloride 0 9 % infusion (not administered)   dextrose 50 % IV solution 25 mL (50 mL Intravenous Given 7/20/18 1418)       Diagnostic Studies  Results Reviewed     Procedure Component Value Units Date/Time    CBC and differential [[id number]]  (Abnormal) Collected:  07/20/18 1407    Lab Status:  Final result Specimen:  Blood from Arm, Right Updated:  07/20/18 1451     WBC 26 97 (H) Thousand/uL      RBC 5 07 Million/uL      Hemoglobin 16 6 g/dL      Hematocrit 51 6 (H) %       (H) fL      MCH 32 7 pg      MCHC 32 2 g/dL      RDW 15 8 (H) %      MPV 13 0 (H) fL      Platelets 007 (L) Thousands/uL      nRBC 0 /100 WBCs     Ethanol [[id number]]  (Normal) Collected:  07/20/18 1407    Lab Status:  Final result Specimen:  Blood from Arm, Right Updated:  07/20/18 1451     Ethanol Lvl <3 mg/dL     Lactic acid, plasma [[id number]]  (Normal) Collected:  07/20/18 1407    Lab Status:  Final result Specimen:  Blood from Arm, Right Updated:  07/20/18 1445     LACTIC ACID 1 7 mmol/L     Narrative:         Result may be elevated if tourniquet was used during collection      Protime-INR [[id number]]  (Abnormal) Collected:  07/20/18 1407    Lab Status:  Final result Specimen:  Blood from Arm, Right Updated:  07/20/18 1439     Protime 17 8 (H) seconds      INR 1 49 (H)    APTT [[id number]] (Normal) Collected:  07/20/18 1407    Lab Status:  Final result Specimen:  Blood from Arm, Right Updated:  07/20/18 1439     PTT 31 seconds     Comprehensive metabolic panel [[id number]]  (Abnormal) Collected:  07/20/18 1407    Lab Status:  Final result Specimen:  Blood from Arm, Right Updated:  07/20/18 1436     Sodium 146 (H) mmol/L      Potassium 3 7 mmol/L      Chloride 103 mmol/L      CO2 36 (H) mmol/L      Anion Gap 7 mmol/L      BUN 56 (H) mg/dL      Creatinine 2 19 (H) mg/dL      Glucose 58 (L) mg/dL      Calcium 9 0 mg/dL      AST 22 U/L      ALT 11 (L) U/L      Alkaline Phosphatase 59 U/L      Total Protein 7 2 g/dL      Albumin 2 6 (L) g/dL      Total Bilirubin 2 10 (H) mg/dL      eGFR 27 ml/min/1 73sq m     Narrative:         National Kidney Disease Education Program recommendations are as follows:  GFR calculation is accurate only with a steady state creatinine  Chronic Kidney disease less than 60 ml/min/1 73 sq  meters  Kidney failure less than 15 ml/min/1 73 sq  meters  Magnesium [[id number]]  (Normal) Collected:  07/20/18 1407    Lab Status:  Final result Specimen:  Blood from Arm, Right Updated:  07/20/18 1436     Magnesium 2 2 mg/dL     POCT Chem 8+ [[id number]]  (Abnormal) Collected:  07/20/18 1431    Lab Status:  Final result Updated:  07/20/18 1436     SODIUM, I-STAT 146 (H) mmol/l      Potassium, i-STAT 2 6 (L) mmol/L      Chloride, istat 109 (H) mmol/L      CO2, i-STAT 26 mmol/L      Anion Gap, Istat 15 (H) mmol/L      Calcium, Ionized i-STAT 0 70 (LL) mmol/L      BUN, I-STAT 49 (H) mg/dl      Creatinine, i-STAT 1 4 (H) mg/dl      eGFR 46 ml/min/1 73sq m      Glucose, i-STAT 148 (H) mg/dl      Hct, i-STAT 36 (L) %      Hgb, i-STAT 12 2 g/dl      Specimen Type VENOUS    Blood culture #2 [[id number]] Collected:  07/20/18 1407    Lab Status: In process Specimen:  Blood from Arm, Right Updated:  07/20/18 1419    Blood culture #1 [[id number]] Collected:  07/20/18 1407    Lab Status:   In process Specimen: Blood from Arm, Right Updated:  07/20/18 1418    Blood gas, venous [[id number]]  (Abnormal) Collected:  07/20/18 1407    Lab Status:  Final result Specimen:  Blood from Arm, Right Updated:  07/20/18 1418     pH, Dustin 7 430 (H)     pCO2, Dustin 55 6 (H) mm Hg      pO2, Dustin 30 2 (L) mm Hg      HCO3, Dustin 36 1 (H) mmol/L      Base Excess, Dustin 9 3 mmol/L      O2 Content, Dustin 13 0 ml/dL      O2 HGB, VENOUS 53 2 (L) %     Fingerstick Glucose (POCT) [[id number]]  (Abnormal) Collected:  07/20/18 1401    Lab Status:  Final result Updated:  07/20/18 1402     POC Glucose 54 (L) mg/dl     UA w Reflex to Microscopic w Reflex to Culture [[id number]]     Lab Status:  No result Specimen:  Urine                  XR chest 1 view portable   Final Result by Berto Lara MD (07/20 1451)      Persistent bibasilar opacities with moderate to large pleural effusions, minimally improved from the prior exam   Central vascular congestion  Workstation performed: [id number]         CT head without contrast   Final Result by Antoine Garcia MD (07/20 1447)      No acute intracranial abnormality  Chronic small and large vessel ischemic changes, similar from MRI of September 2016  Workstation performed: [id number]                    Procedures  ECG 12 Lead Documentation  Date/Time: 7/20/2018 2:28 PM  Performed by: William Sorto  Authorized by: Adriana COTE     Indications / Diagnosis:  Visual loss, suspected CVA  ECG reviewed by me, the ED Provider: yes    Patient location:  ED  Rate:     ECG rate:  100  Rhythm:     Rhythm: sinus rhythm    Comments:      LAD, LBBB, abnormal EKG, lateral ST depression   Impression - abnormal EKG, changed from prior     CriticalCare Time  Performed by: William Sorto  Authorized by: William Sorto     Critical care provider statement:     Critical care time (minutes):  60    Critical care time was exclusive of:  Separately billable procedures and treating other patients    Critical care was necessary to treat or prevent imminent or life-threatening deterioration of the following conditions:  Renal failure, respiratory failure, sepsis, dehydration and CNS failure or compromise    Critical care was time spent personally by me on the following activities:  Blood draw for specimens, obtaining history from patient or surrogate, development of treatment plan with patient or surrogate, evaluation of patient's response to treatment and examination of patient           Phone Contacts  ED Phone Contact    ED Course  ED Course as of Jul 20 1459 Fri Jul 20, 2018   1403 2:03 PM spoke at length w patient's caregiver  Attempted to contact family given my concern for a likely imminent demise, no contact info available  Caretaker reports multiple months of failed attempts at contacting his daughter  Caretaker knows pt well and feels strongly that his wishes are to allow natural death without aggressive intervention  Although it is challenging to ascertain with certainty what his wishes are for end-of-life care, the best information available at this time from his caregiver is that pt would NOT want aggressive intervention and therefore I will honor what I believe to be the pt's wishes  1413 2:13 PM I was able to have a discussion w pt about his wishes  At this time he states again that he feels that he is dying and he clearly and repeatedly states that he wishes for NO CPR and NO INTUBATION and "let me pass through when it's my time " although he appears ill and is mildly hypoglycemic I feel confident that this conversation accurately reflects his wishes for care and I will honor these requests  His caregiver remains at bedside and feels that this conversation reflects the patients wishes for care                                   MDM    The patient presented with a condition in which there was a high probability of imminent or life-threatening deterioration, and critical care services (excluding separately billable procedures) totalled 30-74 minutes  Disposition  Final diagnoses:   Visual loss   DNR (do not resuscitate)   DNI (do not intubate)   Hypotension   Hypokalemia   Acute kidney injury ([facility] [address] )     Time reflects when diagnosis was documented in both MDM as applicable and the Disposition within this note     Time User Action Codes Description Comment    7/20/2018  2:44 PM Skip Montane Add [H54 7] Visual loss     7/20/2018  2:44 PM Skip Montane Add Denisha Ottadrianne DNR (do not resuscitate)     7/20/2018  2:44 PM [place], Dorethia Comfort Add [Z78 9] DNI (do not intubate)     7/20/2018  2:44 PM Skip Montane Add [I95 9] Hypotension     7/20/2018  2:44 PM Skip Montane Add [E87 6] Hypokalemia     7/20/2018  2:44 PM [place], Dorethia Comfort Add [N17 9] Acute kidney injury [facility])       ED Disposition     None      Follow-up Information    None         Patient's Medications   Discharge Prescriptions    No medications on file     No discharge procedures on file      ED Provider  Electronically Signed by           Savage Johnson MD  07/20/18 9433

## 2018-07-20 NOTE — H&P
History and Physical - [facility]    Patient Information: Kenia Rocha 80 y o  male MRN: [id number]  Unit/Bed#: ED 06 Encounter: [id number]  Admitting Physician: Fernando Hanks MD  PCP: Ayla Vega MD  Date of Admission:  07/20/18    Assessment/Plan:    Hospital Problem List:     Principal Problem:    Sepsis [facility])  Active Problems:    Dilated cardiomyopathy ([facility] )    ICD (implantable cardioverter-defibrillator) in place    Acute on chronic renal insufficiency    Acute kidney injury superimposed on chronic kidney disease ([facility] )    Atrial fibrillation ([facility] )    COPD (chronic obstructive pulmonary disease) ([facility] )    TIA (transient ischemic attack)    CAD (coronary artery disease)    Moderate protein-calorie malnutrition ([facility] )    Debility    Vision loss, bilateral      Plan for the Primary Problem(s):  · 1  Sepsis- source possibly urinary  Will place on IV vancomycin and cefepime  CXR clear  · 2  Acute vision loss in both eyes- patient vision has returned  Will r/o TIA vs  CVA  Patient CT head negative  He is unable to get an MRI due to his pacemaker  On aspirin and statin  Will continue to monitor  · 3  COPD- not in acute exacerbation  · 4  Cachexia- on megase  · 5  CAD s/p AICD-   · 6  Chronic systolic HF- on lasix  · 7  Severe protein calorie malnutrition  · 8  Acute on chronic CKD 3- will give gentle IVF  · 9  Hypotension- secondary to sepsis  Will give IVF  · 10  HTN- will hold all medications since patient is hypotensive  · 11  H/o gout  · 12  Patient DNR- he states that he just wants to be comfortable  ·     Plan for Additional Problems:   ·     VTE Prophylaxis: Enoxaparin (Lovenox)  / sequential compression device   Code Status: DNR  POLST: There is no POLST form on file for this patient (pre-hospital)    Anticipated Length of Stay:  Patient will be admitted on an Inpatient basis with an anticipated length of stay of  more 2 midnights     Justification for [facility] Stay: sepsis    Total Time for Visit, including Counseling / Coordination of Care: 30 minutes  Greater than 50% of this total time spent on direct patient counseling and coordination of care  Chief Complaint:   Patient unable to see in both eyes in the morning  History of Present Illness:    Jessy Selby is a 80 y o  male who presents with HTN, CHF, COPD, CKD, cachexia, has caregiver and lives alone was brought in by his caregiver because he was unable to see in both of his eyes which has now resolved  Patient denies any extremity weakness  No facial droop or slurred speech  No numbness  In ER patient found to have elevated WBC of 26k and hypotensive  Patient will be admitted for further evaluation  Patient denies any chest pain, palpitations or diaphoresis  No cough, fever or chills but patient is a poor historian  He also denies any burning on urination, or increased frequency  Review of Systems:    Review of Systems   Constitutional: Negative for activity change, chills, diaphoresis, fatigue, fever and unexpected weight change  HENT: Negative  Respiratory: Negative  Cardiovascular: Negative  Gastrointestinal: Negative  Genitourinary: Negative  Musculoskeletal: Positive for arthralgias and joint swelling  Negative for back pain, gait problem, myalgias, neck pain and neck stiffness  Neurological: Positive for weakness  Negative for dizziness, tremors, seizures, syncope, facial asymmetry, speech difficulty, light-headedness, numbness and headaches         Past Medical and Surgical History:     Past Medical History:   Diagnosis Date    AAA (abdominal aortic aneurysm) ([facility] )     Arthritis     CAD (coronary artery disease)     CHF (congestive heart failure) ([facility]) 06/19/2018    CKD (chronic kidney disease), stage III     COPD (chronic obstructive pulmonary disease) ([facility])     Hypertension     Hypertensive nephrosclerosis     Ischemic cardiomyopathy     Pleural effusion due to CHF (congestive heart failure) (HCC)     Pulmonary HTN (HCC)        Past Surgical History:   Procedure Laterality Date    CARDIAC PACEMAKER PLACEMENT         Meds/Allergies:    Prior to Admission medications    Medication Sig Start Date End Date Taking? Authorizing Provider   aspirin (ASPIRIN LOW DOSE) 81 MG tablet Take 1 tablet by mouth daily    Historical Provider, MD   carvedilol (COREG) 3 125 mg tablet Take 3 125 mg by mouth 2 (two) times a day with meals    Historical Provider, MD   colchicine (COLCRYS) 0 6 mg tablet Take 0 6 mg by mouth as needed Take 2 tablets at onset of symptoms and then 1 tablet 1 hour later    Historical Provider, MD   furosemide (LASIX) 80 mg tablet TAKE 1 TABLET EVERY DAY HOLD FOR WEIGHT LESS THAN 120 7/12/18   Zara Harris MD   furosemide (LASIX) 80 mg tablet Take 1 tablet (80 mg total) by mouth 2 (two) times a day 7/5/18   Elizaebth Lisa MD   hydrALAZINE (APRESOLINE) 25 mg tablet TAKE 1 TABLET TWICE DAILY HOLD IF SBP <100 7/17/18   Robe Stone PA-C   isosorbide dinitrate (ISORDIL) 20 mg tablet Take 20 mg by mouth daily at bedtime    Historical Provider, MD   meclizine (ANTIVERT) 25 mg tablet Take 25 mg by mouth 3 (three) times a day    Historical Provider, MD   megestrol (MEGACE) 20 mg tablet Take 1 tablet (20 mg total) by mouth 4 (four) times a day 7/5/18   Elizabeth Lisa MD   potassium chloride (K-DUR,KLOR-CON) 20 mEq tablet Take 1 tablet (20 mEq total) by mouth daily 7/6/18   Elizabeth Lisa MD     I have reviewed home medications with patient personally      Allergies: No Known Allergies    Social History:     Marital Status: Single   Occupation:   Patient Pre-hospital Living Situation: home  Patient Pre-hospital Level of Mobility: walks  Patient Pre-hospital Diet Restrictions: cardiac  Substance Use History:   History   Alcohol Use    0 6 oz/week    1 Glasses of wine per week     Comment: daily with dinner     History   Smoking Status    Former Smoker    Packs/day: 0 00    Types: Cigarettes    Quit date: 1/1/2016   Smokeless Tobacco    Never Used     History   Drug Use No       Family History:    non-contributory    Physical Exam:     Vitals:   Blood Pressure: (!) 87/48 (07/20/18 1346)  Pulse: 78 (07/20/18 1346)  Temperature: 98 3 °F (36 8 °C) (07/20/18 1346)  Temp Source: Axillary (07/20/18 1346)  Height: 5' 6" (167 6 cm) (07/20/18 1346)  Weight - Scale: 47 8 kg (105 lb 6 1 oz) (07/20/18 1346)    Physical Exam   Constitutional: He is oriented to person, place, and time  cachexic   HENT:   Head: Normocephalic and atraumatic  Eyes: Conjunctivae and EOM are normal  Pupils are equal, round, and reactive to light  Neck: Normal range of motion  Neck supple  No JVD present  No tracheal deviation present  No thyromegaly present  Cardiovascular: Normal rate, regular rhythm and normal heart sounds  Exam reveals no gallop and no friction rub  No murmur heard  Pulmonary/Chest: Effort normal and breath sounds normal  No respiratory distress  He has no wheezes  He has no rales  Abdominal: Soft  Bowel sounds are normal  He exhibits no distension  There is no tenderness  There is no rebound  Musculoskeletal: Normal range of motion  He exhibits no edema  Neurological: He is alert and oriented to person, place, and time  Vitals reviewed  Additional Data:     Lab Results: I have personally reviewed pertinent reports          Results from last 7 days  Lab Units 07/20/18  1431 07/20/18  1407   WBC Thousand/uL  --  26 97*   HEMOGLOBIN g/dL  --  16 6   I STAT HEMOGLOBIN g/dl 12 2  --    HEMATOCRIT %  --  51 6*   PLATELETS Thousands/uL  --  127*   LYMPHO PCT %  --  0*   MONO PCT MAN %  --  2*   EOSINO PCT MANUAL %  --  0       Results from last 7 days  Lab Units 07/20/18  1431 07/20/18  1407   SODIUM mmol/L  --  146*   POTASSIUM mmol/L  --  3 7   CHLORIDE mmol/L  --  103   CO2 mmol/L  --  36*   BUN mg/dL  --  56*   CREATININE mg/dL  --  2 19*   CALCIUM mg/dL  --  9 0   TOTAL PROTEIN g/dL  --  7 2   BILIRUBIN TOTAL mg/dL  --  2 10*   ALK PHOS U/L  --  59   ALT U/L  --  11*   AST U/L  --  22   GLUCOSE RANDOM mg/dL  --  58*   GLUCOSE, ISTAT mg/dl 148*  --        Results from last 7 days  Lab Units 07/20/18  1407   INR  1 49*       Imaging: I have personally reviewed pertinent reports  Xr Chest 1 View Portable    Result Date: 7/20/2018  Narrative: CHEST INDICATION:   hypoxia  COMPARISON:  7/3/2018 EXAM PERFORMED/VIEWS:  XR CHEST PORTABLE FINDINGS:  Left-sided chest wall pacemaker is identified  Pacemaker leads are intact  Heart shadow is obscured by adjacent opacity  Persistent bibasilar opacities with moderate to large pleural effusions, minimally improved from the prior exam   Central vascular congestion  Osseous structures appear within normal limits for patient age  Impression: Persistent bibasilar opacities with moderate to large pleural effusions, minimally improved from the prior exam   Central vascular congestion  Workstation performed: [id number]     Xr Chest Portable    Result Date: 7/3/2018  Narrative: INDICATION:  Shortness of breath  ORDERING PROVIDER:  Angel Martinez  TECHNIQUE:  Frontal chest was obtained at 0832 hours  COMPARISON:  7/1/18 XR  FINDINGS:  The cardiomediastinal silhouette is obscured by right greater than left fairly homogeneous perihilar, mid lung and bibasal opacities most consistent with pleural fluid and atelectasis  Central right greater than left increased pulmonary vascularity is also seen suggesting severe CHF/fluid overload with pulmonary edema  No change mildly enlarged heavily calcified aortic knob  No change left pacemaker/defibrillator battery with single contiguous appearing intraventricular lead noted with findings most suggestive cardiomegaly due to position of the inferior left midline pacemaker/defibrillator metal lead tip noted  No large gross pneumothorax suggested  No acute osseous process identified  No subdiaphragmatic free air seen grossly  Impression: Severe CHF/fluid overload with pulmonary edema and fairly large right and moderate left pleural effusions and atelectasis  Superimposed pneumonia or mass/neoplasm not excluded  Recommend follow-up chest exam as clinically indicated for clearing as clinically indicated to exclude pneumonia/neoplasm  Signed by Andrew Myers MD    Xr Chest Portable    Result Date: 7/1/2018  Narrative: INDICATION:  Shortness of breath  ORDERING PROVIDER:  Eloy Eisenmenger  TECHNIQUE:  Frontal chest was obtained at 12:08 hours  COMPARISON:  06/26/2018 XR  FINDINGS:  The cardiomediastinal silhouette appears enlarged but stable  Diffuse pulmonary vascular congestion is unchanged  There is a left-sided pacemaker     There has been slight increase in the small left basilar effusion with worsening atelectasis  There has been moderate increase of the right pleural effusion which is now moderate in size     There is no pneumothorax  No acute osseous process  Impression: Significant increase in right pleural effusion which is now moderate in size  Slight increase in the left basilar effusion with worsening adjacent left basilar atelectasis  Unchanged diffuse pulmonary vascular congestion  Signed by Phil Figueroa DO    Xr Chest Portable    Result Date: 6/26/2018  Narrative: INDICATION:  Shortness of breath  Hypoxia  ORDERING PROVIDER: Noble Paulson  TECHNIQUE:  Frontal chest was obtained at 18:36 hours  COMPARISON:  06/24/18 XR  FINDINGS:  The cardiomediastinal silhouette remains mildly enlarged and again seen is the left-sided cardiac pacer     The congestive failure or pulmonary edema pattern is only very minimally improved from the 24th     Lateral pleural effusions remain, larger on the right     There is no pneumothorax  No acute osseous process  Impression: There is only minimal improvement in the pulmonary edema or congestive failure pattern from the 24th    Bilateral pleural effusions remain     Signed by Moon Freitas MD    Xr Chest Pa & Lateral    Result Date: 6/22/2018  Narrative: INDICATION:  Weakness  ORDERING PROVIDER: Dawson Mancuso  TECHNIQUE:  Frontal and lateral chest  COMPARISON:  4/18/18 XR  FINDINGS:  The cardiomediastinal silhouette is enlarged  Left-sided single lead ICD pacemaker is in place  Bibasilar air space opacities are demonstrated with small bilateral effusions and likely mild vascular congestion  There is no pneumothorax  No acute osseous process  IMPRESSION: Bibasilar airspace opacities with small effusions and mild pulmonary vascular congestion  Signed by Vikki Balderas MD    Ct Head Without Contrast    Result Date: 7/20/2018  Narrative: CT BRAIN - WITHOUT CONTRAST INDICATION:   Visual loss, sudden onset  COMPARISON:  Brain MRI performed September 7, 2016 TECHNIQUE:  CT examination of the brain was performed  In addition to axial images, coronal 2D reformatted images were created and submitted for interpretation  Radiation dose length product (DLP) for this visit:  990 mGy-cm   This examination, like all CT scans performed in the [facility], was performed utilizing techniques to minimize radiation dose exposure, including the use of iterative reconstruction and automated exposure control  IMAGE QUALITY:  Diagnostic  FINDINGS: PARENCHYMA: Cortical encephalomalacia in right temporal lobe and in the right superior parietal lobule, unchanged and related to remote prior MCA distribution infarctions  Decreased attenuation is noted in periventricular and subcortical white matter demonstrating an appearance that is statistically most likely to represent advanced microangiopathic change; this appearance is similar when compared to most recent prior examination  Chronic lacunar infarction(s) are noted in basal ganglia, unchanged from prior exam  No CT signs of acute infarction  No intracranial mass, mass effect or midline shift    No acute parenchymal hemorrhage  VENTRICLES AND EXTRA-AXIAL SPACES:  Enlargement of ventricles and extra-axial CSF with volume loss, unchanged from prior examination  No hydrocephalus  VISUALIZED ORBITS AND PARANASAL SINUSES:  Unremarkable  CALVARIUM AND EXTRACRANIAL SOFT TISSUES:  Normal      Impression: No acute intracranial abnormality  Chronic small and large vessel ischemic changes, similar from MRI of September 2016  Workstation performed: [id number]     Fl Barium Swallow Video W Speech    Result Date: 6/29/2018  Narrative: Indication: Dysphagia  COMPARISON: None  TECHNIQUE: Modified video swallowing under physician supervision with speech therapist present  Patient administered solids impregnated with barium  These were puree, soft, ice cream and mixed fruit  Patient was administered liquid barium nectar and water thin consistencies via teaspoon, cup and straw  Videofluoroscopy obtained  7 minutes 6 seconds fluoroscopy   26 1 mGy total dose  FINDINGS: Oral phase demonstrated reduced bolus formation with spillage, oral retention, piecemeal posterior transfer and prolonged transfer  Pharyngeal phase demonstrated delayed initiation of swallowing with chronic vallecular and milder piriform retention  Vallecular retention, incompletely cleared with repeat swallow and liquid wash  Chin tuck transiently facilitated clearing with swallowing  Base of tongue, vallecular and posterior pharyngeal wall retention  No penetration nor aspiration  Impression: No penetration nor aspiration  Signed by Alicia Barrett MD    Xr Toe Right Second Min 2 Views    Result Date: 6/25/2018  Narrative: INDICATION:  Pain right great toe tender to touch  It is the second toe which is labeled as painful on the images  TECHNIQUE:  Three view(s) of the right toe  COMPARISON:  None Available  FINDINGS:  The great toe is not fully included on imaging  The second toe is targeted for evaluation  There is no displaced fracture    There is minimal hallux valgus with ossification along the medial margin of the first metacarpal head with possible underlying erosion  Bipartite tibial sesamoid  Cystic change or erosion of the sesamoids also seen  Osteoarthritis of the visualized interphalangeal joints  Mild soft tissue swelling of the tips of the great toe and second toe  Mild flattening of the articular surface of the second and third metatarsals, likely stress related  Question of ulceration of the tip of the second toe  No erosion is appreciated  No soft tissue gas or radiopaque foreign body  IMPRESSION: There is soft tissue swelling and possibly an ulcer at the tip of the second toe with no osteomyelitis  As far as the great toe is concerned, possible changes of gout of the region of the first MTP joint  Clinical correlation is needed  If clinically warranted consider MRI if not contraindicated  Signed by Anni Sol MD    Xr Chest 1 View    Result Date: 6/24/2018  Narrative: INDICATION:  Shortness of breath  ORDERING PROVIDER:      TECHNIQUE:  Frontal chest was obtained at 0657 hours  COMPARISON:  6/22/2018 XR  FINDINGS:  The cardiomediastinal silhouette is large  Left dual defibrillator leads are properly positioned  There is acute pulmonary edema  There is bibasilar vascular congestion with bibasilar effusions  There is a fluid collection in the interlobar minor fissure on the right There is no pneumothorax  No acute osseous process  IMPRESSION: Cardiomegaly  Acute pulmonary edema  Bibasilar vascular congestion with bibasilar effusions and the failure pattern is rapidly progressive in the interval of 48 hours      Signed by Celia Wang MD      EKG, Pathology, and Other Studies Reviewed on Admission:   · EKG:     Allscripts / Epic Records Reviewed: Yes     ** Please Note: This note has been constructed using a voice recognition system   **

## 2018-07-21 LAB
ANION GAP SERPL CALCULATED.3IONS-SCNC: 7 MMOL/L (ref 4–13)
BACTERIA UR QL AUTO: ABNORMAL /HPF
BILIRUB UR QL STRIP: NEGATIVE
BUN SERPL-MCNC: 56 MG/DL (ref 5–25)
CALCIUM SERPL-MCNC: 8.3 MG/DL (ref 8.3–10.1)
CHLORIDE SERPL-SCNC: 107 MMOL/L (ref 100–108)
CLARITY UR: CLEAR
CO2 SERPL-SCNC: 34 MMOL/L (ref 21–32)
COLOR UR: YELLOW
CREAT SERPL-MCNC: 1.98 MG/DL (ref 0.6–1.3)
ERYTHROCYTE [DISTWIDTH] IN BLOOD BY AUTOMATED COUNT: 15.1 % (ref 11.6–15.1)
GFR SERPL CREATININE-BSD FRML MDRD: 30 ML/MIN/1.73SQ M
GLUCOSE SERPL-MCNC: 73 MG/DL (ref 65–140)
GLUCOSE UR STRIP-MCNC: NEGATIVE MG/DL
HCT VFR BLD AUTO: 44.9 % (ref 36.5–49.3)
HGB BLD-MCNC: 14.6 G/DL (ref 12–17)
HGB UR QL STRIP.AUTO: ABNORMAL
HYALINE CASTS #/AREA URNS LPF: ABNORMAL /LPF
KETONES UR STRIP-MCNC: NEGATIVE MG/DL
LEUKOCYTE ESTERASE UR QL STRIP: NEGATIVE
MCH RBC QN AUTO: 33 PG (ref 26.8–34.3)
MCHC RBC AUTO-ENTMCNC: 32.5 G/DL (ref 31.4–37.4)
MCV RBC AUTO: 102 FL (ref 82–98)
NITRITE UR QL STRIP: NEGATIVE
NON-SQ EPI CELLS URNS QL MICRO: ABNORMAL /HPF
PH UR STRIP.AUTO: 5.5 [PH] (ref 4.5–8)
PLATELET # BLD AUTO: 104 THOUSANDS/UL (ref 149–390)
PMV BLD AUTO: 12.5 FL (ref 8.9–12.7)
POTASSIUM SERPL-SCNC: 3.2 MMOL/L (ref 3.5–5.3)
PROT UR STRIP-MCNC: NEGATIVE MG/DL
RBC # BLD AUTO: 4.42 MILLION/UL (ref 3.88–5.62)
RBC #/AREA URNS AUTO: ABNORMAL /HPF
SODIUM SERPL-SCNC: 148 MMOL/L (ref 136–145)
SP GR UR STRIP.AUTO: 1.02 (ref 1–1.03)
UROBILINOGEN UR QL STRIP.AUTO: 0.2 E.U./DL
WBC # BLD AUTO: 23.25 THOUSAND/UL (ref 4.31–10.16)
WBC #/AREA URNS AUTO: ABNORMAL /HPF

## 2018-07-21 PROCEDURE — 81001 URINALYSIS AUTO W/SCOPE: CPT | Performed by: EMERGENCY MEDICINE

## 2018-07-21 PROCEDURE — 80048 BASIC METABOLIC PNL TOTAL CA: CPT | Performed by: INTERNAL MEDICINE

## 2018-07-21 PROCEDURE — 85027 COMPLETE CBC AUTOMATED: CPT | Performed by: INTERNAL MEDICINE

## 2018-07-21 PROCEDURE — 87086 URINE CULTURE/COLONY COUNT: CPT | Performed by: INTERNAL MEDICINE

## 2018-07-21 PROCEDURE — 92610 EVALUATE SWALLOWING FUNCTION: CPT

## 2018-07-21 PROCEDURE — 84145 PROCALCITONIN (PCT): CPT | Performed by: INTERNAL MEDICINE

## 2018-07-21 PROCEDURE — 99232 SBSQ HOSP IP/OBS MODERATE 35: CPT | Performed by: INTERNAL MEDICINE

## 2018-07-21 RX ORDER — FUROSEMIDE 40 MG/1
40 TABLET ORAL
Status: DISCONTINUED | OUTPATIENT
Start: 2018-07-21 | End: 2018-07-22

## 2018-07-21 RX ORDER — COLCHICINE 0.6 MG/1
1.2 TABLET ORAL ONCE
Status: COMPLETED | OUTPATIENT
Start: 2018-07-21 | End: 2018-07-21

## 2018-07-21 RX ORDER — POTASSIUM CHLORIDE 20 MEQ/1
40 TABLET, EXTENDED RELEASE ORAL DAILY
Status: DISCONTINUED | OUTPATIENT
Start: 2018-07-21 | End: 2018-07-26 | Stop reason: HOSPADM

## 2018-07-21 RX ORDER — COLCHICINE 0.6 MG/1
0.6 TABLET ORAL ONCE
Status: COMPLETED | OUTPATIENT
Start: 2018-07-21 | End: 2018-07-21

## 2018-07-21 RX ORDER — ASPIRIN 81 MG/1
81 TABLET ORAL DAILY
Status: DISCONTINUED | OUTPATIENT
Start: 2018-07-21 | End: 2018-07-26 | Stop reason: HOSPADM

## 2018-07-21 RX ADMIN — SODIUM CHLORIDE 75 ML/HR: 0.9 INJECTION, SOLUTION INTRAVENOUS at 05:33

## 2018-07-21 RX ADMIN — ASPIRIN 81 MG: 81 TABLET, COATED ORAL at 13:10

## 2018-07-21 RX ADMIN — FUROSEMIDE 80 MG: 80 TABLET ORAL at 08:39

## 2018-07-21 RX ADMIN — MECLIZINE HYDROCHLORIDE 25 MG: 25 TABLET ORAL at 08:39

## 2018-07-21 RX ADMIN — ENOXAPARIN SODIUM 30 MG: 30 INJECTION SUBCUTANEOUS at 08:43

## 2018-07-21 RX ADMIN — MEGESTROL ACETATE 20 MG: 40 TABLET ORAL at 17:01

## 2018-07-21 RX ADMIN — COLCHICINE 0.6 MG: 0.6 TABLET, FILM COATED ORAL at 14:27

## 2018-07-21 RX ADMIN — VANCOMYCIN HYDROCHLORIDE 500 MG: 500 INJECTION, SOLUTION INTRAVENOUS at 14:27

## 2018-07-21 RX ADMIN — MECLIZINE HYDROCHLORIDE 25 MG: 25 TABLET ORAL at 21:09

## 2018-07-21 RX ADMIN — MEGESTROL ACETATE 20 MG: 40 TABLET ORAL at 21:09

## 2018-07-21 RX ADMIN — FUROSEMIDE 40 MG: 40 TABLET ORAL at 16:59

## 2018-07-21 RX ADMIN — CEFEPIME HYDROCHLORIDE 2000 MG: 2 INJECTION, POWDER, FOR SOLUTION INTRAVENOUS at 16:56

## 2018-07-21 RX ADMIN — MEGESTROL ACETATE 20 MG: 40 TABLET ORAL at 13:22

## 2018-07-21 RX ADMIN — MECLIZINE HYDROCHLORIDE 25 MG: 25 TABLET ORAL at 16:59

## 2018-07-21 RX ADMIN — COLCHICINE 1.2 MG: 0.6 TABLET, FILM COATED ORAL at 13:09

## 2018-07-21 RX ADMIN — PRAVASTATIN SODIUM 40 MG: 40 TABLET ORAL at 16:58

## 2018-07-21 RX ADMIN — POTASSIUM CHLORIDE 40 MEQ: 1500 TABLET, EXTENDED RELEASE ORAL at 13:08

## 2018-07-21 NOTE — SPEECH THERAPY NOTE
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 ([facility]    CKD (chronic kidney disease), stage III    Atrial fibrillation ([facility]    COPD (chronic obstructive pulmonary disease) ([facility] )    TIA (transient ischemic attack)    Pulmonary HTN ([facility]    AAA (abdominal aortic aneurysm) ([facility]    CAD (coronary artery disease)    Hypertensive nephrosclerosis    Anorexia    Moderate protein-calorie malnutrition (HCC)    Acute on chronic systolic CHF (congestive heart failure) ([facility]    Debility    Dysphagia    Sepsis ([facility]    Vision loss, bilateral       Past Medical History  Past Medical History:   Diagnosis Date    AAA (abdominal aortic aneurysm) (HCC)     Arthritis     CAD (coronary artery disease)     CHF (congestive heart failure) ([facility] ) 06/19/2018    CKD (chronic kidney disease), stage III     COPD (chronic obstructive pulmonary disease) ([facility]     Hypertension     Hypertensive nephrosclerosis     Ischemic cardiomyopathy     Pleural effusion due to CHF (congestive heart failure) ([facility] )     Pulmonary HTN ([facility] )        Past Surgical History  Past Surgical History:   Procedure Laterality Date    CARDIAC PACEMAKER PLACEMENT         Summary   Patient presented with a moderate oral and suspected moderate pharyngeal dysphagia  Patient presented with suspected pharyngeal residue of puree and soft solids requiring 1/2 tsps at a time of puree with use of liquid wash and chin tuck to clear  Patient with inconsistent gagging/hawking up secretions from pharynx however, secretions primarily clear   Patient with reduced gagging/hawking with honey thick liquids  Patient with immediate s/s aspiration of thin liquids  Consider repeat VBS in future if swallow function does not appear to improve  Risk for Aspiration: Patient is at risk for aspiration of thin liquids and suspected pharyngeal residue  Recommendations: puree/level 1 diet and honey thick liquids     Recommended Form of Meds: crushed in pudding, given with 1/2 tsps at a time and liquid wash     Aspiration precautions and compensatory swallowing strategies: upright posture, only feed when fully alert, slow rate of feeding, small bites/sips, chin tuck, effortful swallow and alternating bites and sips     **Encourage patient to take 1/2 tsp of puree then sip of honey thick liquid and chin tuck to clear**      Current Medical Status  Pt is a 80 y o  male who presented to [facility] brought in by his caregiver because he was unable to see in both of his eyes which has now resolved  Patient denies any extremity weakness  No facial droop or slurred speech  No numbness  In ER patient found to have elevated WBC of 26k and hypotensive  Patient will be admitted for further evaluation  Patient denies any chest pain, palpitations or diaphoresis  No cough, fever or chills but patient is a poor historian  He also denies any burning on urination, or increased frequency  Patient dx with Sepsis  In ED, patient had difficulty swallowing his pills/ice/water and made NPO  Swallowing evaluation orders placed and evaluation completed at bedside  Patient previously had VBS at [address] [place] [address] on 6/29/18  Per VBS report: Pt presents with mild-moderate oropharyngeal dysphagia characterized by reduced bolus formation and prolonged mastication and A-P transfer, premature spillage, oral retention with piecemeal deglutition, appearing delayed swallow with reduced elevation and pharyngeal constriction with mild-mod pharyngeal retention   Airway closure appears complete with no penetration/aspiration observed  Pt does experience mild backflow/burping with intake  Mild throat clearing/coughing present throughout, suspected to be caused by sensation of residual and backflow as no penetration/aspiration was seen  Recommended: Mechanical soft level 2 diet and thin liquids with slow rate, small bites, and chin tuck  Recommended pills whole one at a time with liquids  Past medical history:  HTN, COPD, CKD and CHF  Please see H&P for details      Special Studies:  CT Head 7/20/18: no acute intracranial abnormality  CXR 7/20/18: Persistent bibasilar opacities with moderate to large pleural effusions, minimally improved from the prior exam   Central vascular congestion  Social/Education/Vocational Hx:  Pt lives alone with caregiver      Swallow Information   Current Risks for Dysphagia & Aspiration: known history of dysphagia     Current Symptoms/Concerns: coughing with po and change in respiratory status    Current Diet: NPO      Baseline Diet: mechanically altered/level 2 diet and thin liquids      Baseline Assessment   Behavior/Cognition: alert    Speech/Language Status: able to participate in conversation and able to follow commands    Patient Positioning: upright in bed    Pain Status/Interventions/Response to Interventions:   No report of or nonverbal indications of pain  Swallow Mechanism Exam   Oral-motor structures and function are WNL for symmetry, strength, ROM & coordination      Facial: symmetrical  Labial: WFL  Lingual: WFL   Velum: unable to visualize  Mandible: adequate ROM  Dentition: limited dentition  Vocal quality:weak and hoarse   Volitional Cough: weak   Tracheostomy: n/a      Consistencies Assessed and Performance   Consistencies Administered: thin liquids, nectar thick, honey thick, puree and mechanical soft solids, meds crushed in pudding w/ RN  Specific materials administered included water, nectar thick apple juice, honey thick orange juice, applesauce, soggy cereal in NTL milk, pudding w/ meds crushed,     Oral Stage: moderate dysphagia  Patient took tsps of puree with adequate labial seal to spoon, manipulation, and transfer  Patient took tsp of soggy/soft cereal with NTL milk with prolonged and reduced mastication and transfer  Patient took HTL, NTL, and thin liquid via cup with adequate labial seal, minimally delayed swallow initiation, and suspected decreased oral control of thin liquids  Pharyngeal Stage: moderate dysphagia  Patient suspected to have minimally delayed swallow initiation, reduced HLE, with inconsistent gagging/hawking secretions from pharynx when given tsps of puree and soft solid  Secretions expectorated were primarily clear with occasional tinge of applesauce  Patient benefit from small 1/2 tsps of puree with use of liquid wash and chin tuck to clear suspected pharyngeal residue  Patient appeared to have less pharyngeal residue with cohesive puree of pudding vs  applesauce  Patient tolerated 1/2 tsps of pudding with crushed meds while alternating with sips of HTL  Patient with reduced gagging/hawking with HTL then NTL  Patient with immediate weak cough with thin liquids x2 and wet vocal quality  Esophageal Concerns: belching, c/o feeling things "go down" or "stuck"    Strategies and Efficacy: small 1/2 tsps of puree, chin tuck with hard swallows, slow rate, frequent breaks, assistance/fed all meals      Summary and Recommendations (see above)    Results Reviewed with: patient, RN and MD     Consider VBS if swallow function does not appear to improve  Treatment Recommended: Speech therapy to address swallow function and determine least restrictive diet  Frequency of treatment: 3-4x/week    Dysphagia Goals per SLP: Patient will tolerate least restrictive diet with no overt s/s aspiration  Pt/Family Education: initiated  Pt and caregivers would benefit from/require continued education      Speech Therapy Prognosis Prognosis: fair    Prognosis Considerations: age, medical status, cognitive status and respiratory status

## 2018-07-21 NOTE — PLAN OF CARE
Problem: SLP ADULT - SWALLOWING, IMPAIRED  Goal: Initial SLP swallow eval performed  Outcome: Completed Date Met: 07/21/18  Puree and honey thick liquids, meds crushed in pudding

## 2018-07-21 NOTE — PLAN OF CARE
Activity Intolerance/Impaired Mobility     Mobility/activity is maintained at optimum level for patient Progressing        Communication Impairment     Ability to express needs and understand communication Bernardo Herrera Discharge to home or other facility with appropriate resources Progressing        HEMATOLOGIC - ADULT     Maintains hematologic stability Progressing        INFECTION - ADULT     Absence or prevention of progression during hospitalization Progressing     Absence of fever/infection during neutropenic period Progressing        Knowledge Deficit     Patient/family/caregiver demonstrates understanding of disease process, treatment plan, medications, and discharge instructions Progressing        Neurological Deficit     Neurological status is stable or improving Progressing        Nutrition     Nutrition/Hydration status is improving Progressing        Nutrition/Hydration-ADULT     Nutrient/Hydration intake appropriate for improving, restoring or maintaining nutritional needs Progressing        PAIN - ADULT     Verbalizes/displays adequate comfort level or baseline comfort level Progressing        Potential for Aspiration     Non-ventilated patient's risk of aspiration is minimized Progressing     Ventilated patient's risk of aspiration is minimized Progressing        Potential for Falls     Patient will remain free of falls Progressing        Prexisting or High Potential for Compromised Skin Integrity     Skin integrity is maintained or improved Progressing        RESPIRATORY - ADULT     Achieves optimal ventilation and oxygenation Progressing        SAFETY ADULT     Maintain or return to baseline ADL function Progressing     Maintain or return mobility status to optimal level Progressing        SKIN/TISSUE INTEGRITY - ADULT     Skin integrity remains intact Progressing     Incision(s), wounds(s) or drain site(s) healing without S/S of infection Progressing  Oral mucous membranes remain intact Progressing

## 2018-07-21 NOTE — MALNUTRITION/BMI
This medical record reflects one or more clinical indicators suggestive of malnutrition  Malnutrition Findings:   Malnutrition type: Chronic illness (Malnutrition of moderate degree of chronic illness related to prolonged inadequate energy intake seen in use of appetite stimulants as evidenced by orbitals slightly depressed with slightly dark circles, visble clavicle, 6% (7lb) weight loss in 1 month )  Degree of Malnutrition: Malnutrition of moderate degree (Treated with Pureed with honey thick diet and supplements)  Malnutrition Characteristics: Fat loss, Muscle loss, Weight loss    BMI Findings:  BMI Classifications: Underweight < 18 5     Body mass index is 16 72 kg/m²  See Nutrition note dated 7/21/18 for additional details  Completed nutrition assessment is viewable in the nutrition documentation

## 2018-07-21 NOTE — PROGRESS NOTES
[facility] Progress Note  Patient: Yehuda Bustillo 80 y o  male   MRN: [id number]  PCP: Zina Sylvester MD  Unit/Bed#: [facility] Encounter: [id number]  Date Of Visit: 07/21/18    Assessment:    Principal Problem:    [facility]epsis ([facility] )  Active Problems:    Dilated cardiomyopathy ([facility] )    ICD (implantable cardioverter-defibrillator) in place    Acute on chronic renal insufficiency    Acute kidney injury superimposed on chronic kidney disease (HCC)    Atrial fibrillation (HCC)    COPD (chronic obstructive pulmonary disease) ([facility] )    TIA (transient ischemic attack)    CAD (coronary artery disease)    Moderate protein-calorie malnutrition ([facility] )    Debility    Vision loss, bilateral      Plan:    # SIRs; meets criteria with tachycardia and leukocytosis  - source unclear  - Was started on broad spectrum abx  - unfortunately no U/a, urine cx was ordered from the ED, will order now  - CXR w/o consolidation; if u/a is ruled out as a source may have to consider ct chest w/o contrast  - f/u blood cx  - wonder if leukocytosis could be as a result of acute gout   - cont to trend WBC  - lactic acid normal  - check procalcitonin  - Will d/c abx if no source is found    # Chronic systolic heart failure, NICM ef of 35%, ICD in place  - cont lasix though lower to 40mg bid given BP on low side  - d/c IVF     # ? Vision deficit - pt denies acute vision loss he does underlying vision impairment he states from 3 yrs ago  No visual deficit on exam   Low suspicious for acute intracranial given supposedly was b/l vision deficit besides ct head is w/o acute intracranial pathology    Unable to proceed with MRI given ICD    # RT big toe pain - consistent with acute gout; elevated uric acid as well  - Will give colchicine 1 2mg x 1 then 0 6 thereafter; dosing per pharmacy; given crcl of < 30 just cannot receive repeated dosing  - Given CKD unable to use nsaids and would want to avoid steroids until infxn process is ruled out    # Chronic afib - rate controlled, not chronically on a/c, cont asa    # Severe protein -caloric malnutrition - cont megace    # Chronic respiratory failure secondary to COPD - no sign of exacerbation  - on home o2 of 2L    # CKD stg IV - cr w/in baseline range    # HTN - bp on low side     # Hypokalemia - replete accordingly    # Dysphagia, chronic - s/p eval per speech recommending pureed with honey thick; was on dysphagia level 2 prior    # Hx of CAD  - on asa/statin; imdur on hold for now given low bp    VTE Pharmacologic Prophylaxis:   Pharmacologic: Enoxaparin (Lovenox)  Mechanical VTE Prophylaxis in Place: No    Patient Centered Rounds: I have performed bedside rounds with nursing staff today  Discussions with Specialists or Other Care Team Provider:     Education and Discussions with Family / Patient: Patient    Time Spent for Care: 45 minutes  More than 50% of total time spent on counseling and coordination of care as described above  Current Length of Stay: 1 day(s)    Current Patient Status: Inpatient   Certification Statement: The patient will continue to require additional inpatient hospital stay due to SIRs source unidentified, acute gout     Discharge Plan / Estimated Discharge Date:     Code Status: Level 3 - DNAR and DNI      Subjective:   Patient seen and examined at bedside  C/o of RT big toe pain  Afebrile  Denies sob; not clear on  symptoms    Objective:     Vitals:   Temp (24hrs), Av 1 °F (36 7 °C), Min:97 8 °F (36 6 °C), Max:98 3 °F (36 8 °C)    HR:  [] 72  Resp:  [18-19] 18  BP: ()/(48-71) 106/55  SpO2:  [90 %-99 %] 98 %  Body mass index is 16 72 kg/m²  Input and Output Summary (last 24 hours):     No intake or output data in the 24 hours ending 18 1042    Physical Exam:     Physical Exam   Constitutional: He appears well-developed  cachetic   Neck: Neck supple  Cardiovascular: Normal rate, normal heart sounds and intact distal pulses    Exam reveals no gallop and no friction rub  No murmur heard  Irregularly, irregular   Pulmonary/Chest: Effort normal and breath sounds normal  No respiratory distress  He has no wheezes  He has no rales  He exhibits no tenderness  Abdominal: Soft  Bowel sounds are normal  He exhibits no distension  Additional Data:     Labs:      Results from last 7 days  Lab Units 07/21/18  0446  07/20/18  1407   WBC Thousand/uL 23 25*  --  26 97*   HEMOGLOBIN g/dL 14 6  --  16 6   I STAT HEMOGLOBIN   --   < >  --    HEMATOCRIT % 44 9  --  51 6*   PLATELETS Thousands/uL 104*  --  127*   LYMPHO PCT %  --   --  0*   MONO PCT MAN %  --   --  2*   EOSINO PCT MANUAL %  --   --  0   < > = values in this interval not displayed  Results from last 7 days  Lab Units 07/21/18  0446  07/20/18  1407   SODIUM mmol/L 148*  --  146*   POTASSIUM mmol/L 3 2*  --  3 7   CHLORIDE mmol/L 107  --  103   CO2 mmol/L 34*  --  36*   BUN mg/dL 56*  --  56*   CREATININE mg/dL 1 98*  --  2 19*   CALCIUM mg/dL 8 3  --  9 0   TOTAL PROTEIN g/dL  --   --  7 2   BILIRUBIN TOTAL mg/dL  --   --  2 10*   ALK PHOS U/L  --   --  59   ALT U/L  --   --  11*   AST U/L  --   --  22   GLUCOSE RANDOM mg/dL 73  --  58*   GLUCOSE, ISTAT   --   < >  --    < > = values in this interval not displayed  Results from last 7 days  Lab Units 07/20/18  1407   INR  1 49*       * I Have Reviewed All Lab Data Listed Above  * Additional Pertinent Lab Tests Reviewed:  All Labs Within Last 24 Hours Reviewed    Imaging:    Imaging Reports Reviewed Today Include:   Imaging Personally Reviewed by Myself Includes:      Recent Cultures (last 7 days):           Last 24 Hours Medication List:     Current Facility-Administered Medications:  acetaminophen 650 mg Oral Q6H PRN Prema Núñez MD   aspirin 81 mg Oral Once Reji Delaney MD   aspirin 325 mg Oral Once Prema Núñez MD   cefepime 2,000 mg Intravenous Q24H Prema Núñez MD   enoxaparin 30 mg Subcutaneous Daily Prema Núñez MD   fentanyl citrate (PF) 25 mcg Intravenous Q1H PRN Lisa Carbajal MD   furosemide 80 mg Oral BID (diuretic) Armen Meneses MD   meclizine 25 mg Oral TID Armen Meneses MD   megestrol 20 mg Oral 4x Daily Armen Meneses MD   pravastatin 40 mg Oral Daily With Kehinde Rosales MD   vancomycin 500 mg Intravenous Q24H Armen Meneses MD        Today, Patient Was Seen By: Holli Green DO    ** Please Note: This note has been constructed using a voice recognition system   **

## 2018-07-22 ENCOUNTER — APPOINTMENT (INPATIENT)
Dept: CT IMAGING | Facility: HOSPITAL | Age: 83
DRG: 871 | End: 2018-07-22
Payer: MEDICARE

## 2018-07-22 LAB
ANION GAP SERPL CALCULATED.3IONS-SCNC: 6 MMOL/L (ref 4–13)
BACTERIA UR CULT: NORMAL
BUN SERPL-MCNC: 57 MG/DL (ref 5–25)
CALCIUM SERPL-MCNC: 9.1 MG/DL (ref 8.3–10.1)
CHLORIDE SERPL-SCNC: 108 MMOL/L (ref 100–108)
CO2 SERPL-SCNC: 34 MMOL/L (ref 21–32)
CREAT SERPL-MCNC: 1.91 MG/DL (ref 0.6–1.3)
ERYTHROCYTE [DISTWIDTH] IN BLOOD BY AUTOMATED COUNT: 15.4 % (ref 11.6–15.1)
GFR SERPL CREATININE-BSD FRML MDRD: 32 ML/MIN/1.73SQ M
GLUCOSE SERPL-MCNC: 91 MG/DL (ref 65–140)
HCT VFR BLD AUTO: 47.9 % (ref 36.5–49.3)
HGB BLD-MCNC: 15.2 G/DL (ref 12–17)
MCH RBC QN AUTO: 33 PG (ref 26.8–34.3)
MCHC RBC AUTO-ENTMCNC: 31.7 G/DL (ref 31.4–37.4)
MCV RBC AUTO: 104 FL (ref 82–98)
PLATELET # BLD AUTO: 103 THOUSANDS/UL (ref 149–390)
PMV BLD AUTO: 12.5 FL (ref 8.9–12.7)
POTASSIUM SERPL-SCNC: 3.5 MMOL/L (ref 3.5–5.3)
PROCALCITONIN SERPL-MCNC: 1.91 NG/ML
RBC # BLD AUTO: 4.61 MILLION/UL (ref 3.88–5.62)
SODIUM SERPL-SCNC: 148 MMOL/L (ref 136–145)
VANCOMYCIN TROUGH SERPL-MCNC: 7.9 UG/ML (ref 10–20)
WBC # BLD AUTO: 18.53 THOUSAND/UL (ref 4.31–10.16)

## 2018-07-22 PROCEDURE — 99232 SBSQ HOSP IP/OBS MODERATE 35: CPT | Performed by: INTERNAL MEDICINE

## 2018-07-22 PROCEDURE — 85027 COMPLETE CBC AUTOMATED: CPT | Performed by: INTERNAL MEDICINE

## 2018-07-22 PROCEDURE — 80202 ASSAY OF VANCOMYCIN: CPT | Performed by: INTERNAL MEDICINE

## 2018-07-22 PROCEDURE — 71250 CT THORAX DX C-: CPT

## 2018-07-22 PROCEDURE — 80048 BASIC METABOLIC PNL TOTAL CA: CPT | Performed by: INTERNAL MEDICINE

## 2018-07-22 RX ORDER — DEXTROSE MONOHYDRATE 50 MG/ML
100 INJECTION, SOLUTION INTRAVENOUS CONTINUOUS
Status: DISCONTINUED | OUTPATIENT
Start: 2018-07-22 | End: 2018-07-23

## 2018-07-22 RX ADMIN — VANCOMYCIN HYDROCHLORIDE 500 MG: 500 INJECTION, SOLUTION INTRAVENOUS at 15:31

## 2018-07-22 RX ADMIN — DEXTROSE 100 ML/HR: 5 SOLUTION INTRAVENOUS at 21:18

## 2018-07-22 RX ADMIN — FUROSEMIDE 40 MG: 40 TABLET ORAL at 08:12

## 2018-07-22 RX ADMIN — MEGESTROL ACETATE 20 MG: 40 TABLET ORAL at 08:13

## 2018-07-22 RX ADMIN — ENOXAPARIN SODIUM 30 MG: 30 INJECTION SUBCUTANEOUS at 08:12

## 2018-07-22 RX ADMIN — MEGESTROL ACETATE 20 MG: 40 TABLET ORAL at 17:54

## 2018-07-22 RX ADMIN — MECLIZINE HYDROCHLORIDE 25 MG: 25 TABLET ORAL at 21:14

## 2018-07-22 RX ADMIN — CEFEPIME HYDROCHLORIDE 2000 MG: 2 INJECTION, POWDER, FOR SOLUTION INTRAVENOUS at 16:34

## 2018-07-22 RX ADMIN — MECLIZINE HYDROCHLORIDE 25 MG: 25 TABLET ORAL at 08:11

## 2018-07-22 RX ADMIN — DEXTROSE 100 ML/HR: 5 SOLUTION INTRAVENOUS at 10:24

## 2018-07-22 RX ADMIN — ASPIRIN 81 MG: 81 TABLET, COATED ORAL at 08:12

## 2018-07-22 RX ADMIN — PRAVASTATIN SODIUM 40 MG: 40 TABLET ORAL at 15:33

## 2018-07-22 RX ADMIN — MECLIZINE HYDROCHLORIDE 25 MG: 25 TABLET ORAL at 15:33

## 2018-07-22 RX ADMIN — POTASSIUM CHLORIDE 40 MEQ: 1500 TABLET, EXTENDED RELEASE ORAL at 08:11

## 2018-07-22 RX ADMIN — MEGESTROL ACETATE 20 MG: 40 TABLET ORAL at 13:34

## 2018-07-22 RX ADMIN — MEGESTROL ACETATE 20 MG: 40 TABLET ORAL at 21:13

## 2018-07-22 NOTE — PHYSICIAN ADVISOR
Current patient class: Inpatient  The patient is currently on Hospital Day: 3 at [address]        The patient was admitted to the hospital  on 7/20/18 at 21  for the following diagnosis:  Hypokalemia [E87 6]  Visual loss [H54 7]  Hypotension [I95 9]  DNI (do not intubate) [Z78 9]  DNR (do not resuscitate) Hector La  Acute kidney injury ([address] ) [N17 9]  Vision changes [H53 9]       There is documentation in the medical record of an expected length of stay of at least 2 midnights  The patient is therefore expected to satisfy the 2 midnight benchmark and given the 2 midnight presumption is appropriate for INPATIENT ADMISSION  Given this expectation of a satisfying stay, CMS instructs us that the patient is most often appropriate for inpatient admission under part A provided medical necessity is documented in the chart  After review of the relevant documentation, labs, vital signs and test results, the patient is appropriate for INPATIENT ADMISSION  Admission to the hospital as an inpatient is a complex decision making process which requires the practitioner to consider the patients presenting complaint, history and physical examination and all relevant testing  With this in mind, in this case, the patient was deemed appropriate for INPATIENT ADMISSION  After review of the documentation and testing available at the time of the admission I concur with this clinical determination of medical necessity  The patient does have an inpatient admission within the previous 30 days  The patient was admitted on 6/25/18 and discharged on 7/5/18 as an inpatient  The patient therefore required readmission review  In this case the patient should be considered a SEPARATE and UNRELATED INPATIENT ADMISSION  The patient had been discharged in stable condition with a completed care plan   There were no unresolved acute medical issues at the time of discharged which would have reasonably been expected to prompt this readmission  Rationale is as follows: The patient is a 80 yrs old Male who presented to the ED at 7/20/2018  1:41 PM with a chief complaint of Loss of Vision (Patient stated that he woke up this AM and he was not able to see  Patient told EMS that "he was ready to go")  Patient admitted for sepsis - source possibly urinary - labs showed elevated WBC , he was hypotensive on arrival to ED - patient  Started on IV antibiotics  Started on IV fluids for acute kidney injury  , serial monitoring of renal function  also diagnosed with a cute gout and started on colchicine   Will be undergoing CT chest for further workup as he has elevated pro calcitonin   Patient was admitted from 6/25/28-7/5/18 for worsening SOB and weakness - he was placed on fluid restriction and IV lasix for CHF and underwent inpatient rehab and was discharged home in stable condition       Current admission is therefore separate and unrelated     The patients vitals on arrival were ED Triage Vitals   Temperature Pulse Respirations Blood Pressure SpO2   07/20/18 1346 07/20/18 1346 07/20/18 1600 07/20/18 1346 07/20/18 1600   98 3 °F (36 8 °C) 78 19 (!) 87/48 90 %      Temp Source Heart Rate Source Patient Position - Orthostatic VS BP Location FiO2 (%)   07/20/18 1346 07/22/18 0700 07/20/18 1346 07/20/18 1346 --   Axillary Monitor Lying Right arm       Pain Score       07/20/18 1600       No Pain           Past Medical History:   Diagnosis Date    AAA [facility]ab[facility]) [facility]     Arthritis     CAD (coronary artery disease)     CHF [facility]congestive heart failure) ([facility] ) 06/19/2018    CKD (chronic kidney disease), stage III     COPD (chronic obstructive pulmonary disease) ([facility]     Hypertension     Hypertensive nephrosclerosis     Ischemic cardiomyopathy     Pleural effusion due to CHF (congestive heart failure) ([facility])     Pulmonary HTN ([facility] )      Past Surgical History:   Procedure Laterality Date    CARDIAC PACEMAKER PLACEMENT             Consults have been placed to:   IP CONSULT TO WOUND CARE    Vitals:    07/21/18 1500 07/21/18 2300 07/22/18 0700 07/22/18 1500   BP: 101/50 105/56 116/58 112/61   BP Location: Left arm Left arm Right arm Right arm   Pulse: 78 76 77 69   Resp: 22 22 22 (!) 24   Temp: 98 2 °F (36 8 °C) 98 8 °F (37 1 °C) 97 9 °F (36 6 °C) 97 6 °F (36 4 °C)   TempSrc: Oral Axillary Axillary Axillary   SpO2: 92% 93% 92% 91%   Weight:       Height:           Most recent labs:    Recent Labs      07/20/18   1407   07/22/18   0437  07/22/18   0608   WBC  26 97*   < >  18 53*   --    HGB  16 6   < >  15 2   --    HCT  51 6*   < >  47 9   --    PLT  127*   < >  103*   --    K  3 7   < >   --   3 5   NA  146*   < >   --   148*   CALCIUM  9 0   < >   --   9 1   BUN  56*   < >   --   57*   CREATININE  2 19*   < >   --   1 91*   INR  1 49*   --    --    --    AST  22   --    --    --    ALT  11*   --    --    --    ALKPHOS  59   --    --    --    BILITOT  2 10*   --    --    --     < > = values in this interval not displayed  Scheduled Meds:  Current Facility-Administered Medications:  acetaminophen 650 mg Oral Q6H PRN Juliette Lee MD    aspirin 81 mg Oral Daily Franci Harkins DO    aspirin 81 mg Oral Once Neto Raman MD    aspirin 325 mg Oral Once Juliette Lee MD    cefepime 2,000 mg Intravenous Q24H Juliette Lee MD Last Rate: 2,000 mg (07/22/18 1634)   dextrose 100 mL/hr Intravenous Continuous Franci Harkins DO Last Rate: 100 mL/hr (07/22/18 1024)   enoxaparin 30 mg Subcutaneous Daily Juliette Lee MD    meclizine 25 mg Oral TID Juliette Lee MD    megestrol 20 mg Oral 4x Daily Juliette Lee MD    potassium chloride 40 mEq Oral Daily Franci Harkins DO    pravastatin 40 mg Oral Daily With Phil Mckeon MD    vancomycin 500 mg Intravenous Q24H Juliette Lee MD Last Rate: 500 mg (07/22/18 1531)     Continuous Infusions:  dextrose 100 mL/hr Last Rate: 100 mL/hr (07/22/18 1024)     PRN Meds:   acetaminophen    Surgical procedures (if appropriate):

## 2018-07-22 NOTE — CASE MANAGEMENT
Initial Clinical Review    Admission: Date/Time/Statement: 7/20/18 @ 1449     Orders Placed This Encounter   Procedures    Inpatient Admission (expected length of stay for this patient is greater than two midnights)     Standing Status:   Standing     Number of Occurrences:   1     Order Specific Question:   Admitting Physician     Answer:   Harjeet Lockett, [place]     Order Specific Question:   Level of Care     Answer:   Med Surg [16]     Order Specific Question:   Estimated length of stay     Answer:   More than 2 Midnights     Order Specific Question:   Certification     Answer:   I certify that inpatient services are medically necessary for this patient for a duration of greater than two midnights  See H&P and MD Progress Notes for additional information about the patient's course of treatment  ED: Date/Time/Mode of Arrival:   ED Arrival Information     Expected Arrival Acuity Means of Arrival Escorted By Service Admission Type    7/20/2018 13:42 7/20/2018 13:41 Emergent Ambulance [facility] General Medicine Emergency    Arrival Complaint    [address] DIST[address]BANCE          Chief Complaint:   Chief Complaint   Patient presents with    Loss of Vision     Patient stated that he woke up this AM and he was not able to see  Patient told EMS that "he was ready to go"       History of Illness: who presents with HTN, CHF, COPD, CKD, cachexia, has caregiver and lives alone was brought in by his caregiver because he was unable to see in both of his eyes which has now resolved  In ER patient found to have elevated WBC of 26k and hypotensive  Patient will be admitted for further evaluation         ED Vital Signs:   ED Triage Vitals   Temperature Pulse Respirations Blood Pressure SpO2   07/20/18 1346 07/20/18 1346 07/20/18 1600 07/20/18 1346 07/20/18 1600   98 3 °F (36 8 °C) 78 19 (!) 87/48 90 %      Temp Source Heart Rate Source Patient Position - Orthostatic VS BP Location FiO2 (%)   07/20/18 1346 07/22/18 0700 07/20/18 1346 07/20/18 1346 --   Axillary Monitor Lying Right arm       Pain Score       07/20/18 1600       No Pain        Wt Readings from Last 1 Encounters:   07/20/18 47 kg (103 lb 9 9 oz)     O2 5L N/C 92%    Vital Signs (abnormal):   Date/Time  Temp  Pulse  Resp  BP  SpO2  O2 Device  Patient Position - Orthostatic VS   07/22/18 0700  97 9 °F (36 6 °C)  77  22  116/58  92 %  Nasal cannula  Lying   07/21/18 2300  98 8 °F (37 1 °C)  76  22  105/56  93 %  Nasal cannula  Lying   07/21/18 1949  --  --  --  --  --  Nasal cannula  --   07/21/18 1500  98 2 °F (36 8 °C)  78  22  101/50  92 %  Nasal cannula  Lying       Abnormal Labs:    07/20/18 1407    pH, Dustin 7 300 - 7 400 7 430     pCO2, Dustin 42 0 - 50 0 mm Hg 55 6     pO2, Dustin 35 0 - 45 0 mm Hg 30 2     HCO3, Dustin 24 - 30 mmol/L 36 1     Base Excess, Dustin mmol/L 9 3    O2 Content, Dustin ml/dL 13 0       07/21/18 0446    Sodium 136 - 145 mmol/L 148     Potassium 3 5 - 5 3 mmol/L 3 2     Chloride 100 - 108 mmol/L 107    CO2 21 - 32 mmol/L 34     Anion Gap 4 - 13 mmol/L 7    BUN 5 - 25 mg/dL 56     Creatinine 0 60 - 1 30 mg/dL 1 98       07/20/18 1407     WBC 4 31 - 10 16 Thousand/uL 26 97     RBC 3 88 - 5 62 Million/uL 5 07    Hemoglobin 12 0 - 17 0 g/dL 16 6    Hematocrit 36 5 - 49 3 % 51 6     MCV 82 - 98 fL 102     MCH 26 8 - 34 3 pg 32 7    MCHC 31 4 - 37 4 g/dL 32 2    RDW 11 6 - 15 1 % 15 8     MPV 8 9 - 12 7 fL 13 0     Platelets 189 - 179 Thousands/uL 127         Diagnostic Test Results: CT Head - No acute intracranial abnormality  Chronic small and large vessel ischemic changes, similar from MRI of September 2016      PCXR -     ED Treatment:   Medication Administration from 07/20/2018 1341 to 07/20/2018 1607       Date/Time Order Dose Route Action     07/20/2018 1418 dextrose 50 % IV solution 25 mL 50 mL Intravenous Given     07/20/2018 1512 fentanyl citrate (PF) 100 MCG/2ML 25 mcg 25 mcg Intravenous Given     07/20/2018 1420 fentanyl citrate (PF) 100 MCG/2ML 25 mcg 25 mcg Intravenous Given     07/20/2018 1510 sodium chloride 0 9 % bolus 500 mL 500 mL Intravenous New Bag     07/20/2018 1510 cefepime (MAXIPIME) IVPB (premix) 1,000 mg 1,000 mg Intravenous New Bag     07/20/2018 1529 vancomycin (VANCOCIN) IVPB (premix) 750 mg 750 mg Intravenous New Bag          Past Medical/Surgical History:    Active Ambulatory Problems     Diagnosis Date Noted    Dilated cardiomyopathy ([facility] ) 06/23/2018    ICD (implantable cardioverter-defibrillator) in place 06/23/2018    Acute on chronic renal insufficiency 06/23/2018    Acute kidney injury superimposed on chronic kidney disease (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 ) 06/23/2018    CKD (chronic kidney disease), stage III 06/23/2018    Atrial fibrillation ([facility] ) 06/23/2018    COPD (chronic obstructive pulmonary disease) ([facility] ) 06/23/2018    TIA (transient ischemic attack) 06/23/2018    Pulmonary HTN ([facility] ) 06/23/2018    AAA (abdominal aortic aneurysm) ([facility] ) 06/23/2018    CAD (coronary artery disease) 06/23/2018    Hypertensive nephrosclerosis 06/23/2018    Anorexia 06/23/2018    Moderate protein-calorie malnutrition ([facility] ) 06/23/2018    Acute on chronic systolic CHF (congestive heart failure) ([facility] ) 06/23/2018    Debility 06/25/2018    Dysphagia 06/29/2018     Resolved Ambulatory Problems     Diagnosis Date Noted    Congest[facility] disease ([facility] ) 06/23/2018     Past Medical History:   Diagnosis Date    AAA (abdominal aortic aneurysm) (HCC)     Arthritis     CAD (coronary artery disease)     CHF (congestive heart failure) ([facility] ) 06/19/2018    CKD (chronic kidney disease), stage III     [facility]) [facility]     Hypertension     Hypertensive nephrosclerosis     Ischemic cardiomyopathy     Pleural effusion due to CHF (congestive heart failure) (HCC)     Pulmonary HTN (HCC)        Admitting Diagnosis: Hypokalemia [E87 6]  Visual loss [H54 7]  Hypotension [I95 9]  DNI (do not intubate) [Z78 9]  DNR (do not resuscitate) Darshan Jain  Acute kidney injury ([facility] ) [N17 9]  Vision changes [H53 9]    Age/Sex: 80 y o  male    Assessment/Plan:   Hospital Problem List:      Principal Problem:    Sepsis ([facility] )  Active Problems:    Dilated cardiomyopathy ([facility] )    ICD (implantable cardioverter-defibrillator) in place    Acute on chronic renal insufficiency    Acute kidney injury superimposed on chronic kidney disease ([facility] )    Atrial fibrillation (HCC)    COPD (chronic obstructive pulmonary disease) ([facility] )    TIA (transient ischemic attack)    CAD (coronary artery disease)    Moderate protein-calorie malnutrition ([facility] )    Debility    Vision loss, bilateral        Plan for the Primary Problem(s):  · 1  Sepsis- source possibly urinary  Will place on IV vancomycin and cefepime  CXR clear  · 2  Acute vision loss in both eyes- patient vision has returned  Will r/o TIA vs  CVA  Patient CT head negative  He is unable to get an MRI due to his pacemaker  On aspirin and statin  Will continue to monitor  · 3  COPD- not in acute exacerbation  · 4  Cachexia- on megase  · 5  CAD s/p AICD-   · 6  Chronic systolic HF- on lasix  · 7  Severe protein calorie malnutrition  · 8  Acute on chronic CKD 3- will give gentle IVF  · 9  Hypotension- secondary to sepsis  Will give IVF  · 10  HTN- will hold all medications since patient is hypotensive  · 11  H/o gout  · 12  Patient DNR- he states that he just wants to be comfortable  ?      VTE Prophylaxis: Enoxaparin (Lovenox)  / sequential compression device   Code Status: DNR  POLST: There is no POLST form on file for this patient (pre-hospital)     Anticipated Length of Stay:  Patient will be admitted on an Inpatient basis with an anticipated length of stay of  more 2 midnights     Justification for Hospital Stay: sepsis      Admission Orders:  Bld culture x2  Wound Care cons  PT/OT eval and treat    Scheduled Meds: Current Facility-Administered Medications:  aspirin 81 mg Oral Daily   aspirin 81 mg Oral Once   aspirin 325 mg Oral Once   cefepime 2,000 mg Intravenous Q24H   enoxaparin 30 mg Subcutaneous Daily   meclizine 25 mg Oral TID   megestrol 20 mg Oral 4x Daily   potassium chloride 40 mEq Oral Daily   pravastatin 40 mg Oral Daily With Dinner   vancomycin 500 mg Intravenous Q24H     Continuous Infusions:   dextrose 100 mL/hr Last Rate: 100 mL/hr (07/22/18 1024)     PRN Meds:   acetaminophen

## 2018-07-22 NOTE — PROGRESS NOTES
[facility] Progress Note  Patient: Fernie Richards 80 y o  male   MRN: [id number]  PCP: Pierce Irwin MD  Unit/Bed#: [facility] Encounter: [id number]  Date Of Visit: 07/22/18    Assessment:    Principal Problem:    Sepsis ([facility] [address] )  Active Problems:    [facility]    ICD (implantable cardioverter-defibrillator) in place    Acute on chronic renal insufficiency    Acute kidney injury superimposed on chronic kidney disease (HCC)    Atrial fibrillation (HCC)    COPD (chronic obstructive pulmonary disease) ([facility] )    TIA (transient ischemic attack)    CAD (coronary artery disease)    Moderate protein-calorie malnutrition ([facility] )    Debility    Vision loss, bilateral      Plan:    # SIRs; meets criteria with tachycardia and leukocytosis  - source unclear  - unfortunately no U/a, urine cx was ordered from the ED, thus ordered yesterday no pyuria however had at least 2 doses of abx  - CXR w/o consolidation; will proceed with ct chest to r/o pna  - blood cx neg x 1 day  - wonder if leukocytosis could be as a result of acute gout; however elevated procalcitonin as well thus will cont broad spectrum abx for now as leukocytosis is trending down as well    # Chronic systolic heart failure, NICM ef of 35%, ICD in place  - compensated  - improved cxr from prior one  - holding lasix given hypernatremia     # RT big toe pain  - consistent with acute gout; elevated uric acid as well  - s/p colchince x 2, improved  - Given CKD unable to use nsaids and would want to avoid steroids until infxn process is ruled out    # Chronic afib - rate controlled, not chronically on a/c, cont asa    # Severe protein -caloric malnutrition - cont megace    # Chronic respiratory failure secondary to COPD - no sign of exacerbation  - on home o2 of 2L    # CKD stg IV - cr w/in baseline range    # HTN - bp on low side     # Hypernatremia - appears to be clinically dry actually, dextrose free water x 500cc; hold lasix    # Dysphagia, chronic - s/p eval per speech recommending pureed with honey thick; was on dysphagia level 2 prior    # Hx of CAD  - on asa/statin; imdur on hold for now given bp on low side    # Failure to thrive -  - Spoke at length with his caretaker from caring senior agency who spends 8hrs/day with patient  Plan is to see if his hours can be increased as difficult for patient to care for himself  Apparently pt has a dtr but he is estranged from her and the caretaker does not know of any POA  The patient has a significant other but she herself was recently hospitalized, but her dtr is not involved in care of patient   - PT/OT may likely need placement    VTE Pharmacologic Prophylaxis:   Pharmacologic: Enoxaparin (Lovenox)  Mechanical VTE Prophylaxis in Place: No    Patient Centered Rounds: I have performed bedside rounds with nursing staff today  Discussions with Specialists or Other Care Team Provider:     Education and Discussions with Family / Patient: Patient; and his caretaker    Time Spent for Care: 30 minutes  More than 50% of total time spent on counseling and coordination of care as described above  Current Length of Stay: 2 day(s)    Current Patient Status: Inpatient   Certification Statement: The patient will continue to require additional inpatient hospital stay due to SIRs source unidentified, acute gout; failure to thrive    Discharge Plan / Estimated Discharge Date:     Code Status: Level 3 - DNAR and DNI      Subjective:   No acute events overnight  Reports of resolved right big toe pain  Afebrile  Objective:     Vitals:   Temp (24hrs), Av 4 °F (36 9 °C), Min:97 9 °F (36 6 °C), Max:98 8 °F (37 1 °C)    HR:  [70-78] 77  Resp:  [18-22] 22  BP: (101-116)/(50-58) 116/58  SpO2:  [92 %-96 %] 92 %  Body mass index is 16 72 kg/m²  Input and Output Summary (last 24 hours):        Intake/Output Summary (Last 24 hours) at 18 1001  Last data filed at 18 0259   Gross per 24 hour Intake              150 ml   Output             1150 ml   Net            -1000 ml       Physical Exam:     Physical Exam   Constitutional: He appears well-developed  cachetic   Neck: Neck supple  Cardiovascular: Normal rate, normal heart sounds and intact distal pulses  Exam reveals no gallop and no friction rub  No murmur heard  Irregularly, irregular   Pulmonary/Chest: Effort normal and breath sounds normal  No respiratory distress  He has no wheezes  He has no rales  He exhibits no tenderness  Abdominal: Soft  Bowel sounds are normal  He exhibits no distension  Skin:   Right big toe the the mild erythema (improved), now nontender to palpation       Additional Data:     Labs:      Results from last 7 days  Lab Units 07/22/18  0437  07/20/18  1407   WBC Thousand/uL 18 53*  < > 26 97*   HEMOGLOBIN g/dL 15 2  < > 16 6   I STAT HEMOGLOBIN   --   < >  --    HEMATOCRIT % 47 9  < > 51 6*   PLATELETS Thousands/uL 103*  < > 127*   LYMPHO PCT %  --   --  0*   MONO PCT MAN %  --   --  2*   EOSINO PCT MANUAL %  --   --  0   < > = values in this interval not displayed  Results from last 7 days  Lab Units 07/22/18  0608  07/20/18  1407   SODIUM mmol/L 148*  < > 146*   POTASSIUM mmol/L 3 5  < > 3 7   CHLORIDE mmol/L 108  < > 103   CO2 mmol/L 34*  < > 36*   BUN mg/dL 57*  < > 56*   CREATININE mg/dL 1 91*  < > 2 19*   CALCIUM mg/dL 9 1  < > 9 0   TOTAL PROTEIN g/dL  --   --  7 2   BILIRUBIN TOTAL mg/dL  --   --  2 10*   ALK PHOS U/L  --   --  59   ALT U/L  --   --  11*   AST U/L  --   --  22   GLUCOSE RANDOM mg/dL 91  < > 58*   GLUCOSE, ISTAT   --   < >  --    < > = values in this interval not displayed  Results from last 7 days  Lab Units 07/20/18  1407   INR  1 49*       * I Have Reviewed All Lab Data Listed Above  * Additional Pertinent Lab Tests Reviewed:  All Labs Within Last 24 Hours Reviewed    Imaging:    Imaging Reports Reviewed Today Include:   Imaging Personally Reviewed by Myself Includes: Recent Cultures (last 7 days):       Results from last 7 days  Lab Units 07/20/18  1407   BLOOD CULTURE  No Growth at 24 hrs  No Growth at 24 hrs  Last 24 Hours Medication List:     Current Facility-Administered Medications:  acetaminophen 650 mg Oral Q6H PRN Jason Sheehan MD    aspirin 81 mg Oral Daily Vikas Garcia DO    aspirin 81 mg Oral Once Arias Degroot MD    aspirin 325 mg Oral Once Jason Sheehan MD    cefepime 2,000 mg Intravenous Q24H Jason Sheehan MD Last Rate: Stopped (07/21/18 1816)   enoxaparin 30 mg Subcutaneous Daily Jason Sheehan MD    furosemide 40 mg Oral BID (diuretic) Vikas Garcia DO    meclizine 25 mg Oral TID Jason Sheehan MD    megestrol 20 mg Oral 4x Daily Jason Sheehan MD    potassium chloride 40 mEq Oral Daily Vikas Garcia DO    pravastatin 40 mg Oral Daily With Arnulfo Fleming MD    vancomycin 500 mg Intravenous Q24H Jason Sheehan MD Last Rate: 500 mg (07/21/18 4817)        Today, Patient Was Seen By: Vikas Garcia DO    ** Please Note: This note has been constructed using a voice recognition system   **

## 2018-07-23 LAB
ANION GAP SERPL CALCULATED.3IONS-SCNC: 6 MMOL/L (ref 4–13)
BUN SERPL-MCNC: 59 MG/DL (ref 5–25)
CALCIUM SERPL-MCNC: 9 MG/DL (ref 8.3–10.1)
CHLORIDE SERPL-SCNC: 105 MMOL/L (ref 100–108)
CO2 SERPL-SCNC: 32 MMOL/L (ref 21–32)
CREAT SERPL-MCNC: 1.92 MG/DL (ref 0.6–1.3)
ERYTHROCYTE [DISTWIDTH] IN BLOOD BY AUTOMATED COUNT: 15.2 % (ref 11.6–15.1)
GFR SERPL CREATININE-BSD FRML MDRD: 31 ML/MIN/1.73SQ M
GLUCOSE SERPL-MCNC: 113 MG/DL (ref 65–140)
HCT VFR BLD AUTO: 47.9 % (ref 36.5–49.3)
HGB BLD-MCNC: 15.2 G/DL (ref 12–17)
MCH RBC QN AUTO: 32.7 PG (ref 26.8–34.3)
MCHC RBC AUTO-ENTMCNC: 31.7 G/DL (ref 31.4–37.4)
MCV RBC AUTO: 103 FL (ref 82–98)
PLATELET # BLD AUTO: 106 THOUSANDS/UL (ref 149–390)
PMV BLD AUTO: 12.6 FL (ref 8.9–12.7)
POTASSIUM SERPL-SCNC: 3.4 MMOL/L (ref 3.5–5.3)
RBC # BLD AUTO: 4.65 MILLION/UL (ref 3.88–5.62)
SODIUM SERPL-SCNC: 143 MMOL/L (ref 136–145)
WBC # BLD AUTO: 11.39 THOUSAND/UL (ref 4.31–10.16)

## 2018-07-23 PROCEDURE — 80048 BASIC METABOLIC PNL TOTAL CA: CPT | Performed by: INTERNAL MEDICINE

## 2018-07-23 PROCEDURE — 99232 SBSQ HOSP IP/OBS MODERATE 35: CPT | Performed by: INTERNAL MEDICINE

## 2018-07-23 PROCEDURE — 97163 PT EVAL HIGH COMPLEX 45 MIN: CPT

## 2018-07-23 PROCEDURE — 85027 COMPLETE CBC AUTOMATED: CPT | Performed by: INTERNAL MEDICINE

## 2018-07-23 PROCEDURE — G8978 MOBILITY CURRENT STATUS: HCPCS

## 2018-07-23 PROCEDURE — 92526 ORAL FUNCTION THERAPY: CPT

## 2018-07-23 PROCEDURE — G8979 MOBILITY GOAL STATUS: HCPCS

## 2018-07-23 RX ORDER — FUROSEMIDE 20 MG/1
20 TABLET ORAL
Status: DISCONTINUED | OUTPATIENT
Start: 2018-07-23 | End: 2018-07-24

## 2018-07-23 RX ORDER — CEFUROXIME AXETIL 250 MG/1
500 TABLET ORAL EVERY 24 HOURS
Status: DISCONTINUED | OUTPATIENT
Start: 2018-07-23 | End: 2018-07-26 | Stop reason: HOSPADM

## 2018-07-23 RX ADMIN — MEGESTROL ACETATE 20 MG: 40 TABLET ORAL at 22:58

## 2018-07-23 RX ADMIN — MECLIZINE HYDROCHLORIDE 25 MG: 25 TABLET ORAL at 18:09

## 2018-07-23 RX ADMIN — MECLIZINE HYDROCHLORIDE 25 MG: 25 TABLET ORAL at 22:00

## 2018-07-23 RX ADMIN — MEGESTROL ACETATE 20 MG: 40 TABLET ORAL at 18:10

## 2018-07-23 RX ADMIN — PRAVASTATIN SODIUM 40 MG: 40 TABLET ORAL at 18:10

## 2018-07-23 RX ADMIN — FUROSEMIDE 20 MG: 20 TABLET ORAL at 18:09

## 2018-07-23 NOTE — PLAN OF CARE
Problem: PHYSICAL THERAPY ADULT  Goal: Performs mobility at highest level of function for planned discharge setting  See evaluation for individualized goals  Treatment/Interventions: ADL retraining, Functional transfer training, LE strengthening/ROM, Therapeutic exercise, Endurance training, Patient/family training, Bed mobility, Gait training, Spoke to nursing, Spoke to case management  Equipment Recommended: Other (Comment) (TBD at [facility])       See flowsheet documentation for full assessment, interventions and recommendations  Prognosis: Fair  Problem List: Decreased endurance, Decreased range of motion, Decreased strength, Impaired balance, Decreased mobility, Decreased cognition, Decreased safety awareness, Decreased skin integrity  Assessment: Pt is 80 y o  male seen for PT evaluation s/p admit to [facility] on 7/20/2018 w/ Sepsis ([facility] )  PT consulted to assess pt's functional mobility and d/c needs  Order placed for PT eval and tx, w/ up w/ A order  Comorbidities affecting pt's physical performance at time of assessment include: AAA, Arthritis, CAD, CHF, CKD, COPD, HTN, hypertensive nephrosclerosis, ischemic cardiomyopathy, pulmonary HTN, pleural effusion due to CHF  PTA, pt was requiring A for mobility (navigating stairs), lives in bi-level home and has 5 SAVITA  Personal factors affecting pt at time of IE include: inaccessible home environment, lives in bi-level home, stairs to enter home, inability to ambulate household distances and decreased cognition  Please find objective findings from PT assessment regarding body systems outlined above with impairments and limitations including weakness, decreased ROM, impaired balance, decreased endurance, gait deviations, decreased activity tolerance, decreased functional mobility tolerance, decreased safety awareness and fall risk   The following objective measures performed on IE also reveal limitations: Barthel Index: 35/100 and Modified [place]: 4 (moderate/severe disability)  Pt's clinical presentation is currently unstable/unpredictable  Pt to benefit from continued PT tx to address deficits as defined above and maximize level of functional independent mobility and consistency  From PT/mobility standpoint, recommendation at time of d/c would be STR pending progress in order to facilitate return to PLOF  Barriers to Discharge: Inaccessible home environment     Recommendation: Short-term skilled PT     PT - OK to Discharge: Yes (When medically cleared; if to STR)    See flowsheet documentation for full assessment

## 2018-07-23 NOTE — PHYSICAL THERAPY NOTE
Physical Therapy Evaluation     Patient's Name: Yumiko Settler    Admitting Diagnosis  Hypokalemia [E87 6]  Visual loss [H54 7]  Hypotension [I95 9]  DNI (do not intubate) [Z78 9]  DNR (do not resuscitate) Hanny Foot  Acute kidney injury ([facility] ) [N17 9]  Vision changes [H53 9]    Problem List  Patient Active Problem List   Diagnosis    [facility] ([facility]    ICD (implantable cardioverter-defibrillator) in place    Acute on chronic renal insufficiency    Acute kidney injury superimposed on chronic kidney disease ([facility] )    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 ([facility] )    CKD (chronic kidney disease), stage III    Atrial fibrillation ([facility]    COPD (chronic obstructive pulmonary disease) ([facility] )    TIA (transient ischemic attack)    Pulmonary HTN ([facility] )    AAA (abdominal aortic aneurysm) ([facility] )    CAD (coronary artery disease)    Hypertensive nephrosclerosis    Anorexia    Moderate protein-calorie malnutrition (HCC)    Acute on chronic systolic CHF (congestive heart failure) ([facility] )    Debility    Dysphagia    Sepsis ([facility] )    Vision loss, bilateral       Past Medical History  Past Medical History:   Diagnosis Date    AAA (abdominal aortic aneurysm) (HCC)     Arthritis     CAD (coronary artery disease)     CHF (congestive heart failure) ([facility] ) 06/19/2018    CKD (chronic kidney disease), stage III     COPD (chronic obstructive pulmonary disease) ([facility] )     Hypertension     Hypertensive nephrosclerosis     Ischemic cardiomyopathy     Pleural effusion due to CHF (congestive heart failure) ([facility] )     Pulmonary HTN ([facility] )        Past Surgical History  Past Surgical History:   Procedure Laterality Date    CARDIAC PACEMAKER PLACEMENT          07/23/18 1033   Note Type   Note type Eval only   Pain Assessment   Pain Assessment No/denies pain  (Pt denies pain pre- and post-session)   Pain Score No Pain   Home Living   Type of [address] Two level;Bed/bath upstairs;Stairs to enter with rails   Bathroom Shower/Tub Tub/shower unit   [address]    Additional Comments Per chart review Olaf Hull  documentation): Pt lives in a bi-level house with 5 SAVITA w/railing and then 5 steps w/railing to reach the main living area  Additional information obtained from previous PT IE in June 2018   Prior Function   Level of [place] Needs assistance with IADLs; Needs assistance with ADLs and functional mobility   Lives With Significant other   Receives Help From Home health   ADL Assistance Needs assistance   IADLs Needs assistance   Vocational Retired   Comments Per chart review Olaf Hull [employer] documentation): Pt needs assistance from caregiver to navigate steps  Pt lives with his girlfriend Kulwant Bean, but she is elderly and not able to provide any assistance  Rob Cui provides services-8 hrs/day through [employer]  He assists with ADL's, cooking, cleaning, shopping and transporting to any appointments  Pt uses a walker to ambulate and has home O2   Restrictions/Precautions   Weight Bearing Precautions Per Order No   Braces or Orthoses Other (Comment)  (None)   Other Precautions Cognitive; Chair Alarm; Bed Alarm;Multiple lines;Telemetry;O2;Fall Risk  (5 L O2 via NC)   General   Additional Pertinent History 80 y o  male patient presented to ER acute vision loss in both eyes, now resolved per chart review, and admitted for sepsis    Family/Caregiver Present No   Cognition   Overall Cognitive Status Impaired   Arousal/Participation Alert   Orientation Level Disoriented to place; Disoriented to time;Oriented to person;Disoriented to situation   Memory Decreased recall of recent events   Following Commands Follows multistep commands with increased time or repetition   RUE Assessment   RUE Assessment X  (? chronic rotator cuff tear in right shoulder)   RUE Strength   RUE Overall Strength Other (Comment)  (to at least 4/5 based on functional assessment)   LUE Assessment   LUE Assessment WFL   LUE Strength   LUE Overall Strength Other (Comment)  (to at least 4/5 based on functional assessment)   RLE Assessment   RLE Assessment WFL  (to at least 4/5 based on functional assessment)   LLE Assessment   LLE Assessment WFL  (to at least 4/5 based on functional assessment)   Coordination   Movements are Fluid and Coordinated 1   Sensation [facility]   Light Touch   RLE Light Touch Grossly intact   LLE Light Touch Grossly intact   Sharp/Dull   RLE Sharp/Dull Not tested   LLE Sharp/Dull Not tested   Proprioception   RLE Proprioception Grossly intact   LLE Proprioception Grossly Intact   Bed Mobility   Rolling R 3  Moderate assistance   Additional items Assist x 1; Increased time required;Verbal cues; Bedrails   Supine to Sit 4  Minimal assistance   Additional items Assist x 2;HOB elevated; Increased time required;Verbal cues; Bedrails   Sit to Supine 3  Moderate assistance   Additional items Assist x 2;Bedrails; Increased time required;Verbal cues   Transfers   Sit to Stand 4  Minimal assistance   Additional items Assist x 2; Increased time required;Verbal cues; Bedrails   Stand to Sit 4  Minimal assistance   Additional items Assist x 2; Increased time required;Verbal cues; Bedrails   Ambulation/Elevation   Gait pattern Short stride;Decreased foot clearance; Excessively slow  (Lateral stepping for repositioning at bedside)   Gait Assistance 4  Minimal assist   Additional items Assist x 1   Assistive Device Rolling walker   Distance 1'   Stair Management Assistance Not tested   Balance   Static Sitting Good   Dynamic Sitting Fair +   Static Standing Fair -   Dynamic Standing Poor +   Endurance Deficit   Endurance Deficit Yes  (patient denies SOB/chest pain following mobility; Ag Joyce RN present and aware)   Activity Tolerance   Activity Tolerance Patient limited by fatigue   Medical Staff Made Aware Pt w/ up w/ assistance activity order; spoke with Jerry Lim, [address] regarding therapy recommendations   Nurse Made Aware Yes, Gildardo Walls, RN verbalized pt appropriate for PT evaluation; present throughout to assist w/ functional mobility; aware of session outcomes   Assessment   Prognosis Fair   Problem List Decreased endurance;Decreased range of motion;Decreased strength; Impaired balance;Decreased mobility; Decreased cognition;Decreased safety awareness;Decreased skin integrity   Assessment Pt is 80 y o  male seen for PT evaluation s/p admit to [facility] on 7/20/2018 w/ Sepsis ([address] )  PT consulted to assess pt's functional mobility and d/c needs  Order placed for PT eval and tx, w/ up w/ A order  Comorbidities affecting pt's physical performance at time of assessment include: AAA, Arthritis, CAD, CHF, CKD, COPD, HTN, hypertensive nephrosclerosis, ischemic cardiomyopathy, pulmonary HTN, pleural effusion due to CHF  PTA, pt was requiring A for mobility (navigating stairs), lives in bi-level home and has 5 SAVITA  Personal factors affecting pt at time of IE include: inaccessible home environment, lives in bi-level home, stairs to enter home, inability to ambulate household distances and decreased cognition  Please find objective findings from PT assessment regarding body systems outlined above with impairments and limitations including weakness, decreased ROM, impaired balance, decreased endurance, gait deviations, decreased activity tolerance, decreased functional mobility tolerance, decreased safety awareness and fall risk  The following objective measures performed on IE also reveal limitations: Barthel Index: 35/100 and Modified [place]: 4 (moderate/severe disability)  Pt's clinical presentation is currently unstable/unpredictable  Pt to benefit from continued PT tx to address deficits as defined above and maximize level of functional independent mobility and consistency   From PT/mobility standpoint, recommendation at time of d/c would be STR pending progress in order to facilitate return to PLOF  Barriers to Discharge Inaccessible home environment   Goals   Patient Goals "To have a glass of real water to wash down my food "   STG Expiration Date 08/02/18   Short Term Goal #1 In 7-10 days: Increase bilateral LE strength 1/2 grade to facilitate independent mobility, Perform all bed mobility tasks modified independent to decrease caregiver burden, Perform all transfers with SBA to improve independence, Ambulate > 50 ft  with RW with CGA w/o LOB and w/ normalized gait pattern 100% of the time, Increase all balance 1 grade to decrease risk for falls and Complete exercise program independently   Treatment Day 0   Plan   Treatment/Interventions ADL retraining;Functional transfer training;LE strengthening/ROM; Therapeutic exercise; Endurance training;Patient/family training;Bed mobility;Gait training;Spoke to nursing;Spoke to case management   PT Frequency 2-3x/wk   Recommendation   Recommendation Short-term skilled PT   Equipment Recommended Other (Comment)  (TBD at STR)   PT - OK to Discharge Yes  (When medically cleared; if to STR)   Modified Mower Scale   Modified Sarah Scale 4   Barthel Index   Feeding 5   Bathing 0   Grooming Score 5   Dressing Score 5   Bladder Score 0   Bowels Score 10   Toilet Use Score 5   Transfers (Bed/Chair) Score 5   Mobility (Level Surface) Score 0   Stairs Score 0   Barthel Index Score 35         Karli Razo, PT, DPT

## 2018-07-23 NOTE — PROGRESS NOTES
[facility] Progress Note  Patient: Nathaniel Cuevas 80 y o  male   MRN: [id number]  PCP: Jolie Salgado MD  Unit/Bed#: -01 Encounter: [id number]  Date Of Visit: 07/23/18    Assessment:    Principal Problem:    Sepsis ([facility] [address] )  Active Problems:    Dilated cardiomyopathy ([facility] )    ICD (implantable cardioverter-defibrillator) in place    Acute on chronic renal insufficiency    Acute kidney injury superimposed on chronic kidney disease (HCC)    Atrial fibrillation (HCC)    COPD (chronic obstructive pulmonary disease) ([facility] )    TIA (transient ischemic attack)    CAD (coronary artery disease)    Moderate protein-calorie malnutrition ([facility] )    Debility    Vision loss, bilateral      Plan:    # Sepsis met SIRs criteria with tachycardia and leukocytosis  - source initially unclear as neg urine cx and cxr w/o consolidation    Proceeded with CT chest and noted bibasilar consolidation pna unable to be ruled out thus HCAP  - s/p broad spectrum abx, leukocytosis is trending down  - Will de-escalate abx to ceftin  - chronic respiratory failure on home o2 of 2L    # Chronic systolic heart failure, NICM ef of 35%, ICD in place  - compensated  - resume back on lasix though at lowered dose    # RT big toe pain  - consistent with acute gout; elevated uric acid as well  - s/p colchince x 2, improved  - Given CKD unable to use nsaids and would want to avoid steroids until infxn process is ruled out    # Chronic afib - rate controlled, not chronically on a/c, cont asa    # Severe protein-caloric malnutrition -   - cont megace  - on nutrition supplements    # Chronic respiratory failure secondary to COPD - no sign of exacerbation  - on home o2 of 2L    # CKD stg IV - cr w/in baseline range    # HTN - stable    # Hypernatremia - resolved    # Dysphagia, chronic - s/p eval per speech recommending pureed with honey thick; was on dysphagia level 2 prior    # Hx of CAD  - on asa/statin; imdur on hold for now given bp on low side    # Failure to thrive -  - Apparently he had expressed to the staff that he just wants to die, I spoke at length with him and he denies  I specifically spoke about palliative/hospice but he declines  He states he wishes to get better so that he can return home independently  He has a dtr but he is estranged from her and she is not involved in his care  - He is agreeable for STR  - PT/OT     VTE Pharmacologic Prophylaxis:   Pharmacologic: Enoxaparin (Lovenox)  Mechanical VTE Prophylaxis in Place: No    Patient Centered Rounds: I have performed bedside rounds with nursing staff today  Discussions with Specialists or Other Care Team Provider:     Education and Discussions with Family / Patient: Patient    Time Spent for Care: 30 minutes  More than 50% of total time spent on counseling and coordination of care as described above  Current Length of Stay: 3 day(s)    Current Patient Status: Inpatient   Certification Statement: The patient will continue to require additional inpatient hospital stay due to HCAP, acute gout; failure to thrive    Discharge Plan / Estimated Discharge Date:     Code Status: Level 3 - DNAR and DNI      Subjective:   Yesterday pt was expressing to the nursing staff that he just wished to die  I talked with him at length, he denies  He continues to maintain that he is DNR/DNI but he wants to get better so that he can eventually return home  He vehemetly denies suicidal ideation or intent  He denies dyspnea  Objective:     Vitals:   Temp (24hrs), Av °F (36 7 °C), Min:97 6 °F (36 4 °C), Max:98 3 °F (36 8 °C)    HR:  [69-84] 84  Resp:  [20-24] 20  BP: (101-112)/(57-61) 101/57  SpO2:  [91 %] 91 %  Body mass index is 16 72 kg/m²  Input and Output Summary (last 24 hours):        Intake/Output Summary (Last 24 hours) at 18 1006  Last data filed at 18 1045   Gross per 24 hour   Intake               30 ml   Output             1275 ml   Net            -1245 ml Physical Exam:     Physical Exam   Constitutional: He appears well-developed  cachetic   Neck: Neck supple  Cardiovascular: Normal rate, normal heart sounds and intact distal pulses  Exam reveals no gallop and no friction rub  No murmur heard  Irregularly, irregular   Pulmonary/Chest: Effort normal and breath sounds normal  No respiratory distress  He has no wheezes  He has no rales  He exhibits no tenderness  Abdominal: Soft  Bowel sounds are normal  He exhibits no distension  Skin:   Right big toe the the mild erythema (improved), now nontender to palpation       Additional Data:     Labs:      Results from last 7 days  Lab Units 07/23/18  0503  07/20/18  1407   WBC Thousand/uL 11 39*  < > 26 97*   HEMOGLOBIN g/dL 15 2  < > 16 6   I STAT HEMOGLOBIN   --   < >  --    HEMATOCRIT % 47 9  < > 51 6*   PLATELETS Thousands/uL 106*  < > 127*   LYMPHO PCT %  --   --  0*   MONO PCT MAN %  --   --  2*   EOSINO PCT MANUAL %  --   --  0   < > = values in this interval not displayed  Results from last 7 days  Lab Units 07/23/18  0503  07/20/18  1407   SODIUM mmol/L 143  < > 146*   POTASSIUM mmol/L 3 4*  < > 3 7   CHLORIDE mmol/L 105  < > 103   CO2 mmol/L 32  < > 36*   BUN mg/dL 59*  < > 56*   CREATININE mg/dL 1 92*  < > 2 19*   CALCIUM mg/dL 9 0  < > 9 0   TOTAL PROTEIN g/dL  --   --  7 2   BILIRUBIN TOTAL mg/dL  --   --  2 10*   ALK PHOS U/L  --   --  59   ALT U/L  --   --  11*   AST U/L  --   --  22   GLUCOSE RANDOM mg/dL 113  < > 58*   GLUCOSE, ISTAT   --   < >  --    < > = values in this interval not displayed  Results from last 7 days  Lab Units 07/20/18  1407   INR  1 49*       * I Have Reviewed All Lab Data Listed Above  * Additional Pertinent Lab Tests Reviewed:  All Labs Within Last 24 Hours Reviewed    Imaging:    Imaging Reports Reviewed Today Include:   Imaging Personally Reviewed by Myself Includes:      Recent Cultures (last 7 days):       Results from last 7 days  Lab Units 07/21/18  1200 07/20/18  1407   BLOOD CULTURE   --  No Growth at 48 hrs  No Growth at 48 hrs  URINE CULTURE  No Growth <1000 cfu/mL  --        Last 24 Hours Medication List:     Current Facility-Administered Medications:  acetaminophen 650 mg Oral Q6H PRN Fernando Hanks MD    aspirin 81 mg Oral Daily Alina Jurado DO    aspirin 81 mg Oral Once Adriana Rabago MD    aspirin 325 mg Oral Once Fernando Hanks MD    cefepime 2,000 mg Intravenous Q24H Fernando Hanks MD Last Rate: 2,000 mg (07/22/18 1634)   dextrose 100 mL/hr Intravenous Continuous Alina Jurado DO Last Rate: 100 mL/hr (07/23/18 0503)   enoxaparin 30 mg Subcutaneous Daily Fernando Hanks MD    meclizine 25 mg Oral TID Fernando Hanks MD    megestrol 20 mg Oral 4x Daily Fernando Hanks MD    potassium chloride 40 mEq Oral Daily Alina Jurado DO    pravastatin 40 mg Oral Daily With Maisha Robert MD    vancomycin 500 mg Intravenous Q24H Fernando Hanks MD Last Rate: 500 mg (07/22/18 1531)        Today, Patient Was Seen By: Alina Jurado DO    ** Please Note: This note has been constructed using a voice recognition system   **

## 2018-07-23 NOTE — PROGRESS NOTES
Patient has ripped out IV  This makes the fifth IV total he has ripped out intentionally during this hospitalization  Patient also removes O2 nasal cannula and refuses to let staff place it back on him, with him getting aggressive and swinging at staff when staff get near him to place it on  At this time patient does not have IV access, will attempt IV placement in the morning with am labs  Will continue to monitor patient, and monitor his O2 status

## 2018-07-23 NOTE — DISCHARGE INSTR - OTHER ORDERS
Wound care recommendations: 1  Cleanse b/l buttocks, sacrum and lower back with soap and water, pat dry, and apply calazime TID and PRN   2  Apply skin nourishing cream the entire skin daily for moisture  3  Turn and reposition patient every  2 hours   4  Elevate heels off of bed with pillows to offload pressure   5  Soft care cushion to chair when OOB, if able  6  Apply alleyvn foam to b/l heels for prevention  Assess the skin every shift and as needed, change every 5 days and PRN  7  Apply alleyvn foam dressings to R back stage 1 pressure injuries  Change dressing every 3 days  Assess the skin daily and PRN

## 2018-07-23 NOTE — PROGRESS NOTES
New IV inserted and IVF's restarted  Patient appears depressed, as he stated " God is punishing me  I should have been dead already"   Will continue to monitor patient and discuss other possible interventions for patient with the day team

## 2018-07-23 NOTE — SOCIAL WORK
CM met with pt at bedside and spoke to caregiver Laura Crenshaw via phone  Pt lives in a bi-level house with 5 SAVITA w/railing and then 5 steps w/railing to reach the main living area  Pt needs assistance from caregiver to navigate steps  Pt lives with his girlfriend Lio Riggs, but she is elderly and not able to provide any assistance  Adriana Burks provides services-8 hrs/day through [employer]  He assists with ADL's, cooking, cleaning, shopping and transporting to any appointments  Pt uses a walker to ambulate and O2 at hs  He was in [address] [place] [address] for rehab  He uses [facility] and has no problem with co-pays  Denies substance abuse or mental health issues  Pt is a former smoker  Pt has an Advanced Directive and his POA is his girlfriend's daughter Elier Dos Santos  Dr Washburn Prom is his PCP  Pt does not work or drive  Adriana Burks would transport home when medically cleared  CM discussed d/c needs including STR, which was PT's recommendation  Pt was hoping to go home with his caregiver  CM questioned if more hrs could be arranged  Pt has not had contact with his biological daughter Hector Singh in 4 years, but Adriana Burks was able to contact her and she will be arriving to discuss d/c plan today after 4:30pm   CM will f/u after this meeting  CM will follow through hospitalization  CM reviewed discharge planning process including the following: identifying help at home, patient preference for discharge planning needs, pharmacy preference, and availability of treatment team to discuss questions or concerns patient and/or family may have regarding understanding medications and recognizing signs and symptoms once discharged  CM also encouraged patient to follow up with all recommended appointments after discharge  Patient advised of importance for patient and family to participate in managing patients medical well being  CM name and role reviewed and Discharge Checklist provided

## 2018-07-23 NOTE — PLAN OF CARE
Problem: DISCHARGE PLANNING - CARE MANAGEMENT  Goal: Discharge to post-acute care or home with appropriate resources  INTERVENTIONS:  - Conduct assessment to determine patient/family and health care team treatment goals, and need for post-acute services based on payer coverage, community resources, and patient preferences, and barriers to discharge  - Address psychosocial, clinical, and financial barriers to discharge as identified in assessment in conjunction with the patient/family and health care team  - Arrange appropriate level of post-acute services according to patients   needs and preference and payer coverage in collaboration with the physician and health care team  - Communicate with and update the patient/family, physician, and health care team regarding progress on the discharge plan  - Arrange appropriate transportation to post-acute venues  Outcome: Progressing  CM met with pt at bedside and spoke to caregiver Richie Bruce via phone  Pt lives in a bi-level house with 5 SAVITA w/railing and then 5 steps w/railing to reach the main living area  Pt needs assistance from caregiver to navigate steps  Pt lives with his girlfriend Niall Colon, but she is elderly and not able to provide any assistance  Rico Speed provides services-8 hrs/day through [employer]  He assists with ADL's, cooking, cleaning, shopping and transporting to any appointments  Pt uses a walker to ambulate and O2 at hs  He was in [address] [place] [address] for rehab  He uses [facility] and has no problem with co-pays  Denies substance abuse or mental health issues  Pt is a former smoker  Pt has an Advanced Directive and his POA is his girlfriend's daughter Cassius Witt  Dr Mikel Briseno is his PCP  Pt does not work or drive  Rico Speed would transport home when medically cleared  CM discussed d/c needs including STR, which was PT's recommendation  Pt was hoping to go home with his caregiver  CM questioned if more hrs could be arranged    Pt has not had contact with his biological daughter Kaye Briscoe in 4 years, but Keyona Hicks was able to contact her and she will be arriving to discuss d/c plan today after 4:30pm   CM will f/u after this meeting  CM will follow through hospitalization  CM reviewed discharge planning process including the following: identifying help at home, patient preference for discharge planning needs, pharmacy preference, and availability of treatment team to discuss questions or concerns patient and/or family may have regarding understanding medications and recognizing signs and symptoms once discharged  CM also encouraged patient to follow up with all recommended appointments after discharge  Patient advised of importance for patient and family to participate in managing patients medical well being  CM name and role reviewed and Discharge Checklist provided

## 2018-07-23 NOTE — SPEECH THERAPY NOTE
Speech/Language Pathology Progress Note    Patient Name: Maritza Diaz  JDZCJ'F Date: 7/23/2018       Subjective:  Pt's regular caregiver Umair Mota (whom pt calls "Ed") present for session, reports that pt normally eats/drinks a Regular/Thin Liquid diet at home, with frequent belching and wet vocal quality with thin liquids  To the best of his knowledge, the pt has never been evaluated for esophageal dysphagia  Pt himself seated upright out of bed in chair, A+A, jovial, cooperative  After SLP introduced herself and explained purpose of visit, pt asked "Can you help me? They're trying to get me to drink that thing I don't want to drink, and I want to do it my way and just drink some normal water "       Objective: Thin liquids (ice water) via cup p/w oral phase WNL, moderately delayed hyolaryngeal elevation, multiple swallows (3-5 per bolus), wet vocal quality and multiple wet belches after the swallow  Nectar-thick liquids (nectar-thick orange juice) via cup p/w oral phase WNL, mildly delayed hyolaryngeal elevation, double swallow, multiple wet belches after the swallow  Soft solids (soft bread) p/w prolonged mastication, suspected segmented transfer, mildly delayed/effortful hyolaryngeal elevation, multiple swallows (3 per bolus), no overt s/sx aspiration  Pt refused offered purees and honey-thick liquids  Education provided at length to pt and caregiver re: rationale for recommendations, risks of aspiration and PNA related to PO intake, past video swallow study results and recommendations, importance of nutrition for medical recovery  Pt verbally indicated understanding and stated that he did not want to remain in the hospital any longer than necessary, but asked again re: diet texture advance, stating "I just can't do it with that stuff they want  I'd rather not eat than have that   I've got to have some real water " D/w pt re: increased risk of aspiration and PNA (which may then lead to increased length of stay) with diet advance  Offered option of remaining on current Dysphagia 1/Puree and Honey-Thick Liquid diet, which is the safest option with regards to aspiration, or trialing a diet advance, which may involve increased risk of aspiration  Pt requested diet advance  Pt's caregiver Bayron Mir ("Ed") reiterated education to pt, and pt again indicated that he would prefer a diet advance even if it involved increased risk  D/w pt re: advancing diet to Dysphagia 2/Mechanical Soft and Nectar-Thick Liquids, which would still involve increased risk of aspiration, but less so than returning to Regular/Thin Liquid diet  Pt was in agreement with conservative advance  Assessment:  Pt p/w s/sx aspiration with advanced diet textures, however, pt adamantly refuses current Dysphagia 1/Puree and Honey-Thick Liquid diet  D/w pt at length (with assistance and reiteration from pt's regular caregiver, present for session) re: risks of aspiration and PNA, and rationale for recommendations  Pt again refused current diet but was amenable to trialing  conservative diet advance  Plan/Recommendations:  Advance diet to Dysphagia 2/Mechanical Soft and Nectar-Thick Liquids  NO STRAWS  Meds crushed  Setup assistance with trays  Aspiration precautions including upright positioning for all PO intake  ST will continue to follow for dysphagia tx and pt/family education  Consider GI consult to evaluate for esophageal dysphagia

## 2018-07-24 ENCOUNTER — APPOINTMENT (INPATIENT)
Dept: CT IMAGING | Facility: HOSPITAL | Age: 83
DRG: 871 | End: 2018-07-24
Payer: MEDICARE

## 2018-07-24 LAB
ANION GAP SERPL CALCULATED.3IONS-SCNC: 4 MMOL/L (ref 4–13)
ARTERIAL PATENCY WRIST A: YES
BASE EXCESS BLDA CALC-SCNC: 7.7 MMOL/L
BUN SERPL-MCNC: 57 MG/DL (ref 5–25)
CALCIUM SERPL-MCNC: 8.9 MG/DL (ref 8.3–10.1)
CHLORIDE SERPL-SCNC: 105 MMOL/L (ref 100–108)
CO2 SERPL-SCNC: 32 MMOL/L (ref 21–32)
CREAT SERPL-MCNC: 1.8 MG/DL (ref 0.6–1.3)
ERYTHROCYTE [DISTWIDTH] IN BLOOD BY AUTOMATED COUNT: 14.9 % (ref 11.6–15.1)
GFR SERPL CREATININE-BSD FRML MDRD: 34 ML/MIN/1.73SQ M
GLUCOSE SERPL-MCNC: 97 MG/DL (ref 65–140)
HCO3 BLDA-SCNC: 32.1 MMOL/L (ref 22–28)
HCT VFR BLD AUTO: 42.7 % (ref 36.5–49.3)
HGB BLD-MCNC: 13.8 G/DL (ref 12–17)
MCH RBC QN AUTO: 32.7 PG (ref 26.8–34.3)
MCHC RBC AUTO-ENTMCNC: 32.3 G/DL (ref 31.4–37.4)
MCV RBC AUTO: 101 FL (ref 82–98)
NASAL CANNULA: 3
O2 CT BLDA-SCNC: 19.4 ML/DL (ref 16–23)
OXYHGB MFR BLDA: 96.4 % (ref 94–97)
PCO2 BLDA: 44 MM HG (ref 36–44)
PH BLDA: 7.48 [PH] (ref 7.35–7.45)
PLATELET # BLD AUTO: 105 THOUSANDS/UL (ref 149–390)
PMV BLD AUTO: 12.5 FL (ref 8.9–12.7)
PO2 BLDA: 114.6 MM HG (ref 75–129)
POTASSIUM SERPL-SCNC: 3.2 MMOL/L (ref 3.5–5.3)
RBC # BLD AUTO: 4.22 MILLION/UL (ref 3.88–5.62)
SODIUM SERPL-SCNC: 141 MMOL/L (ref 136–145)
SPECIMEN SOURCE: ABNORMAL
WBC # BLD AUTO: 9.84 THOUSAND/UL (ref 4.31–10.16)

## 2018-07-24 PROCEDURE — 70450 CT HEAD/BRAIN W/O DYE: CPT

## 2018-07-24 PROCEDURE — 80048 BASIC METABOLIC PNL TOTAL CA: CPT | Performed by: INTERNAL MEDICINE

## 2018-07-24 PROCEDURE — 99232 SBSQ HOSP IP/OBS MODERATE 35: CPT | Performed by: INTERNAL MEDICINE

## 2018-07-24 PROCEDURE — 36600 WITHDRAWAL OF ARTERIAL BLOOD: CPT

## 2018-07-24 PROCEDURE — 82805 BLOOD GASES W/O2 SATURATION: CPT | Performed by: INTERNAL MEDICINE

## 2018-07-24 PROCEDURE — 85027 COMPLETE CBC AUTOMATED: CPT | Performed by: INTERNAL MEDICINE

## 2018-07-24 RX ORDER — MECLIZINE HYDROCHLORIDE 25 MG/1
25 TABLET ORAL EVERY 8 HOURS PRN
Status: DISCONTINUED | OUTPATIENT
Start: 2018-07-24 | End: 2018-07-26 | Stop reason: HOSPADM

## 2018-07-24 RX ORDER — DEXTROSE, SODIUM CHLORIDE, AND POTASSIUM CHLORIDE 5; .9; .15 G/100ML; G/100ML; G/100ML
75 INJECTION INTRAVENOUS CONTINUOUS
Status: DISPENSED | OUTPATIENT
Start: 2018-07-24 | End: 2018-07-25

## 2018-07-24 RX ADMIN — ENOXAPARIN SODIUM 30 MG: 30 INJECTION SUBCUTANEOUS at 10:26

## 2018-07-24 RX ADMIN — DEXTROSE, SODIUM CHLORIDE, AND POTASSIUM CHLORIDE 75 ML/HR: 5; .9; .15 INJECTION INTRAVENOUS at 16:26

## 2018-07-24 NOTE — SOCIAL WORK
CM spoke to [employer] via phone  A care team meeting is set up for tomorrow at 10:00 with pt, CM, daughter Armida Dave, caregiver Sharleen Barthel, and [facility]  Discussion will include d/c plan  Pt wishes to return home  [facility] will provide 24 hr services if needed

## 2018-07-24 NOTE — PROGRESS NOTES
[facility] Progress Note  Patient: Rin Stephenson 80 y o  male   MRN: [id number]  PCP: Ruth Alfonso MD  Unit/Bed#: -[facility] Encounter: [id number]  Date Of Visit: 07/24/18    Assessment:    Principal Problem:    Sepsis ([facility] [address] )  Active Problems:    Dilated cardiomyopathy ([facility] )    ICD (implantable cardioverter-defibrillator) in place    Acute on chronic renal insufficiency    Acute kidney injury superimposed on chronic kidney disease (HCC)    Atrial fibrillation (HCC)    COPD (chronic obstructive pulmonary disease) ([facility] )    TIA (transient ischemic attack)    CAD (coronary artery disease)    Moderate protein-calorie malnutrition ([facility] )    Debility    Vision loss, bilateral      Plan:    # Acute encephalopathy - unclear etiology  - check abg no CO2 retention  - Will check ct head to r/o acute intracranial pathology  - Extremely poor poor prognosis; failure to thrive  Consideration for hospice; pt himself had expressed 2 days ago that he just wished to die, I spoke at length with the patient however he declined hospice  Unclear of competency though  Fortunately was able to get a hold of his daughter, I have called her twice awaiting a call back  A care meeting is in place for tomorrow  Will also consult palliative  - given poor p o  intake will place on gentle IV fluid hydration    # Sepsis met SIRs criteria with tachycardia and leukocytosis  - source initially unclear as neg urine cx and cxr w/o consolidation    Proceeded with CT chest and noted bibasilar consolidation pna unable to be ruled out thus HCAP  - s/p broad spectrum abx, leukocytosis is trending down  - de-escalated antibiotics to ceftin  - chronic respiratory failure on home o2 of 2L    # Chronic systolic heart failure, NICM ef of 35%, ICD in place  - compensated  - actually clinically dry  - Lasix on hold given poor p o  intake and blood pressure on lower side    # RT big toe pain  - consistent with acute gout; elevated uric acid as well  - s/p colchince x 2, improved  - Given CKD unable to use nsaids and would want to avoid steroids until infxn process is ruled out    # Chronic afib - rate controlled, not chronically on a/c, cont asa    # Severe protein-caloric malnutrition -   - cont megace  - on nutrition supplements    # Chronic respiratory failure secondary to COPD - no sign of exacerbation  - on home o2 of 2L    # CKD stg IV - cr w/in baseline range    # Dysphagia, chronic - s/p eval per speech recommending pureed with honey thick; was on dysphagia level 2 prior    # Hx of CAD  - on asa/statin; imdur on hold for now given bp on low side    VTE Pharmacologic Prophylaxis:   Pharmacologic: Enoxaparin (Lovenox)  Mechanical VTE Prophylaxis in Place: No    Patient Centered Rounds: I have performed bedside rounds with nursing staff today  Discussions with Specialists or Other Care Team Provider:     Education and Discussions with Family / Patient: Patient; called his daughter Lydia Morales contact information in chart no answer therefore left message    Time Spent for Care: 30 minutes  More than 50% of total time spent on counseling and coordination of care as described above  Current Length of Stay: 4 day(s)    Current Patient Status: Inpatient   Certification Statement: The patient will continue to require additional inpatient hospital stay due to Failure to thrive, Hcap, acute encephalopathy    Discharge Plan / Estimated Discharge Date:     Code Status: Level 3 - DNAR and DNI      Subjective: This in a m  patient lethargic though arousable following simple commands  Has been refusing his medications  Two nights ago he had expressed that he just wished to die, when I came in the following morning and discussed with him he denied stating that he did not wish to pursue hospice or palliative care  Again discussed with him today but was unable to have a meaningful conversation due to his lethargy    Objective:     Vitals:   Temp (24hrs), Av 7 °F (36 5 °C), Min:97 3 °F (36 3 °C), Max:97 9 °F (36 6 °C)    HR:  [65-86] 79  Resp:  [17-22] 20  BP: (101-115)/(54-85) 106/54  SpO2:  [93 %-100 %] 95 %  Body mass index is 16 72 kg/m²  Input and Output Summary (last 24 hours): Intake/Output Summary (Last 24 hours) at 18 1449  Last data filed at 18 8317   Gross per 24 hour   Intake              120 ml   Output              300 ml   Net             -180 ml       Physical Exam:     Physical Exam   Constitutional: He appears well-developed  cachetic   Neck: Neck supple  Cardiovascular: Normal rate, normal heart sounds and intact distal pulses  Exam reveals no gallop and no friction rub  No murmur heard  Irregularly, irregular   Pulmonary/Chest: Effort normal and breath sounds normal  No respiratory distress  He has no wheezes  He has no rales  He exhibits no tenderness  Abdominal: Soft  Bowel sounds are normal  He exhibits no distension  Skin:   Right big toe the the mild erythema (improved), now nontender to palpation       Additional Data:     Labs:      Results from last 7 days  Lab Units 18  0504  18  1407   WBC Thousand/uL 9 84  < > 26 97*   HEMOGLOBIN g/dL 13 8  < > 16 6   I STAT HEMOGLOBIN   --   < >  --    HEMATOCRIT % 42 7  < > 51 6*   PLATELETS Thousands/uL 105*  < > 127*   LYMPHO PCT %  --   --  0*   MONO PCT MAN %  --   --  2*   EOSINO PCT MANUAL %  --   --  0   < > = values in this interval not displayed      Results from last 7 days  Lab Units 18  0504  18  1407   SODIUM mmol/L 141  < > 146*   POTASSIUM mmol/L 3 2*  < > 3 7   CHLORIDE mmol/L 105  < > 103   CO2 mmol/L 32  < > 36*   BUN mg/dL 57*  < > 56*   CREATININE mg/dL 1 80*  < > 2 19*   CALCIUM mg/dL 8 9  < > 9 0   TOTAL PROTEIN g/dL  --   --  7 2   BILIRUBIN TOTAL mg/dL  --   --  2 10*   ALK PHOS U/L  --   --  59   ALT U/L  --   --  11*   AST U/L  --   --  22   GLUCOSE RANDOM mg/dL 97  < > 58*   GLUCOSE, ISTAT   --   < >  --    < > = values in this interval not displayed  Results from last 7 days  Lab Units 07/20/18  1407   INR  1 49*       * I Have Reviewed All Lab Data Listed Above  * Additional Pertinent Lab Tests Reviewed: All Labs Within Last 24 Hours Reviewed    Imaging:    Imaging Reports Reviewed Today Include:   Imaging Personally Reviewed by Myself Includes:      Recent Cultures (last 7 days):       Results from last 7 days  Lab Units 07/21/18  1200 07/20/18  1407   BLOOD CULTURE   --  No Growth at 72 hrs  No Growth at 72 hrs  URINE CULTURE  No Growth <1000 cfu/mL  --        Last 24 Hours Medication List:     Current Facility-Administered Medications:  acetaminophen 650 mg Oral Q6H PRN Artem Samayoa MD   aspirin 81 mg Oral Daily Xavier Cherry,    aspirin 81 mg Oral Once Savage Johnson MD   aspirin 325 mg Oral Once Artem Samayoa MD   cefuroxime 500 mg Oral Q24H Nerisodor Dilip, DO   dextrose 5 % and sodium chloride 0 9 % with KCl 20 mEq/L 75 mL/hr Intravenous Continuous Nabeelor Dilip,    enoxaparin 30 mg Subcutaneous Daily Artem Samayoa MD   meclizine 25 mg Oral Q8H PRN Xavier Cherry, DO   megestrol 20 mg Oral 4x Daily Artem Samayoa MD   potassium chloride 40 mEq Oral Daily Nerisodor Dilip, DO   pravastatin 40 mg Oral Daily With Belia Schmidt MD        Today, Patient Was Seen By: Xavier Cherry DO    ** Please Note: This note has been constructed using a voice recognition system   **

## 2018-07-24 NOTE — TREATMENT PLAN
Received callback from patient's daughter Diaz Henriquez  Had extensive discussion with her  Explained to her at length of poor prognosis of her dad  She maintained that her father had always maintained that he would not want any heroic measures and was clear that in the event that his quality of life was diminished he would not want to prolong suffering     I explained to her that obviously his mental state is not at the point to have meaningful conversation in regards to goals of care ie hospice/palliative and that being that she is the only next of kin she would have to assume that responsibility which she is willing to assume  She she is agreeable to proceed with palliative consult and would like to sit down and talk tomorrow morning  It seems she is leaning towards pursuing hospice    He remains DNR DNI    She can be reached at ([place]) [phone]; cell [phone]

## 2018-07-24 NOTE — PLAN OF CARE
Activity Intolerance/Impaired Mobility     Mobility/activity is maintained at optimum level for patient Progressing        Communication Impairment     Ability to express needs and understand communication Bernardo Herrera Discharge to home or other facility with appropriate resources Progressing        DISCHARGE PLANNING - CARE MANAGEMENT     Discharge to post-acute care or home with appropriate resources Progressing        HEMATOLOGIC - ADULT     Maintains hematologic stability Progressing        INFECTION - ADULT     Absence or prevention of progression during hospitalization Progressing     Absence of fever/infection during neutropenic period Progressing        Knowledge Deficit     Patient/family/caregiver demonstrates understanding of disease process, treatment plan, medications, and discharge instructions Progressing        Neurological Deficit     Neurological status is stable or improving Progressing        Nutrition     Nutrition/Hydration status is improving Progressing        Nutrition/Hydration-ADULT     Nutrient/Hydration intake appropriate for improving, restoring or maintaining nutritional needs Progressing        PAIN - ADULT     Verbalizes/displays adequate comfort level or baseline comfort level Progressing        Potential for Aspiration     Non-ventilated patient's risk of aspiration is minimized Progressing     Ventilated patient's risk of aspiration is minimized Progressing        Potential for Falls     Patient will remain free of falls Progressing        Prexisting or High Potential for Compromised Skin Integrity     Skin integrity is maintained or improved Progressing        RESPIRATORY - ADULT     Achieves optimal ventilation and oxygenation Progressing        SAFETY ADULT     Maintain or return to baseline ADL function Progressing     Maintain or return mobility status to optimal level Progressing        SKIN/TISSUE INTEGRITY - ADULT     Skin integrity remains intact Progressing     Incision(s), wounds(s) or drain site(s) healing without S/S of infection Progressing     Oral mucous membranes remain intact Progressing

## 2018-07-24 NOTE — CASE MANAGEMENT
Continued Stay Review    Date:  7/25/2018    Vital Signs: /54 (BP Location: Right arm)   Pulse 80   Temp (!) 97 4 °F (36 3 °C) (Oral)   Resp 18   Ht 5' 6" (1 676 m)   Wt 47 kg (103 lb 9 9 oz)   SpO2 100%   BMI 16 72 kg/m²     Medications:   Scheduled Meds:   Current Facility-Administered Medications:  acetaminophen 650 mg Oral Q6H PRN Artem Samayoa MD    aspirin 81 mg Oral Daily Theodor Dilip, DO    aspirin 81 mg Oral Once Savage Johnson MD    aspirin 325 mg Oral Once Artem Samayoa MD    cefuroxime 500 mg Oral Q24H Theodor Dilip, DO    dextrose 5 % and sodium chloride 0 9 % with KCl 20 mEq/L 75 mL/hr Intravenous Continuous Theodor Dilip, DO Last Rate: 75 mL/hr (07/24/18 1626)   enoxaparin 30 mg Subcutaneous Daily Artem Samayoa MD    meclizine 25 mg Oral Q8H PRN Theodor Dilip, DO    megestrol 20 mg Oral 4x Daily Artem Samayoa MD    potassium chloride 40 mEq Oral Daily Theodor Dilip, DO    pravastatin 40 mg Oral Daily With Belia Schmidt MD      Continuous Infusions:   dextrose 5 % and sodium chloride 0 9 % with KCl 20 mEq/L 75 mL/hr Last Rate: 75 mL/hr (07/24/18 1626)     Assessment/Plan:     # Acute encephalopathy - unclear etiology  - check abg no CO2 retention  - Will check ct head to r/o acute intracranial pathology  - Extremely poor poor prognosis; failure to thrive  Consideration for hospice; pt himself had expressed 2 days ago that he just wished to die, I spoke at length with the patient however he declined hospice  Unclear of competency though  Fortunately was able to get a hold of his daughter, I have called her twice awaiting a call back  A care meeting is in place for tomorrow  Will also consult palliative    - given poor p o  intake will place on gentle IV fluid hydration    - de-escalated antibiotics to ceftin   actually clinically dry  - Lasix on hold given poor p o  intake and blood pressure on lower side    Certification Statement: The patient will continue to require additional inpatient hospital stay due to Failure to thrive, Hcap, acute encephalopathy    Discharge Plan: tbd

## 2018-07-25 VITALS
TEMPERATURE: 98.2 F | BODY MASS INDEX: 16.65 KG/M2 | RESPIRATION RATE: 18 BRPM | SYSTOLIC BLOOD PRESSURE: 130 MMHG | HEIGHT: 66 IN | WEIGHT: 103.62 LBS | OXYGEN SATURATION: 96 % | HEART RATE: 96 BPM | DIASTOLIC BLOOD PRESSURE: 64 MMHG

## 2018-07-25 LAB
ANION GAP SERPL CALCULATED.3IONS-SCNC: 6 MMOL/L (ref 4–13)
BACTERIA BLD CULT: NORMAL
BACTERIA BLD CULT: NORMAL
BUN SERPL-MCNC: 50 MG/DL (ref 5–25)
CALCIUM SERPL-MCNC: 8.8 MG/DL (ref 8.3–10.1)
CHLORIDE SERPL-SCNC: 109 MMOL/L (ref 100–108)
CO2 SERPL-SCNC: 30 MMOL/L (ref 21–32)
CREAT SERPL-MCNC: 1.67 MG/DL (ref 0.6–1.3)
ERYTHROCYTE [DISTWIDTH] IN BLOOD BY AUTOMATED COUNT: 14.9 % (ref 11.6–15.1)
GFR SERPL CREATININE-BSD FRML MDRD: 37 ML/MIN/1.73SQ M
GLUCOSE SERPL-MCNC: 126 MG/DL (ref 65–140)
HCT VFR BLD AUTO: 45.5 % (ref 36.5–49.3)
HGB BLD-MCNC: 14.4 G/DL (ref 12–17)
MCH RBC QN AUTO: 32.3 PG (ref 26.8–34.3)
MCHC RBC AUTO-ENTMCNC: 31.6 G/DL (ref 31.4–37.4)
MCV RBC AUTO: 102 FL (ref 82–98)
PLATELET # BLD AUTO: 110 THOUSANDS/UL (ref 149–390)
PMV BLD AUTO: 12.8 FL (ref 8.9–12.7)
POTASSIUM SERPL-SCNC: 3.5 MMOL/L (ref 3.5–5.3)
RBC # BLD AUTO: 4.46 MILLION/UL (ref 3.88–5.62)
SODIUM SERPL-SCNC: 145 MMOL/L (ref 136–145)
WBC # BLD AUTO: 9.99 THOUSAND/UL (ref 4.31–10.16)

## 2018-07-25 PROCEDURE — 99223 1ST HOSP IP/OBS HIGH 75: CPT | Performed by: INTERNAL MEDICINE

## 2018-07-25 PROCEDURE — 97167 OT EVAL HIGH COMPLEX 60 MIN: CPT

## 2018-07-25 PROCEDURE — 99232 SBSQ HOSP IP/OBS MODERATE 35: CPT | Performed by: INTERNAL MEDICINE

## 2018-07-25 PROCEDURE — G8988 SELF CARE GOAL STATUS: HCPCS

## 2018-07-25 PROCEDURE — 85027 COMPLETE CBC AUTOMATED: CPT | Performed by: INTERNAL MEDICINE

## 2018-07-25 PROCEDURE — G8987 SELF CARE CURRENT STATUS: HCPCS

## 2018-07-25 PROCEDURE — 80048 BASIC METABOLIC PNL TOTAL CA: CPT | Performed by: INTERNAL MEDICINE

## 2018-07-25 RX ADMIN — MEGESTROL ACETATE 20 MG: 40 TABLET ORAL at 11:59

## 2018-07-25 RX ADMIN — DEXTROSE, SODIUM CHLORIDE, AND POTASSIUM CHLORIDE 75 ML/HR: 5; .9; .15 INJECTION INTRAVENOUS at 03:43

## 2018-07-25 NOTE — CONSULTS
Palliative Care Consultation    Reason for consult: Goals of care  Requesting clinician:  Dr Franci Golden: Patient does not have advance directive - would like information  Health Care Directive on file? Health Care Agent:      Comments:   POLST forms: There is no POLST (Physician orders for life-sustaining treatment) form on file for this patient  Code status: Level 3 - DNAR and DNI    Palliative Assessment     Functional assessment:  ECOG:  Symptomatic; in bed >50% of the day  Prognosis:  Poor    Decisional Capacity: questionable  Patient's understanding of illness:  Minimal  Patient goals of care: To be determined    Chief Complaint  Aspiration pneumonia, sepsis, CHF    History obtained from: chart review and the patient  HPI:  Alisa Vasquez is an 70-year-old gentleman with extensive past medical history including nonischemic dilated cardiomyopathy status post AICD, chronic kidney disease, chronic atrial fibrillation, COPD, coronary artery disease, protein calorie malnutrition and dementia who was admitted July 20th with acute bilateral visual loss that subsequently improved  CT of the brain was negative for acute process and vision has improved  Patient was found to be septic secondary to pneumonia and was evaluated by speech therapy and noted to be aspirating  Patient refused to follow recommended puree and honey thick liquid diet  I was consulted to help address goals of care  Patient's daughter, Riley Hahn was contacted by Dr Patsy Ashley yesterday and came from [facility] today to help address goals of care  Notably, Riley Hahn has been estranged for approximately 4 years and has not seen her father in 3 years  Most history is obtained from the chart  Patient 1st entered the [facility] system [facility] 23rd when he presented with acute exacerbation of congestive heart failure and it was noted at that time that he was not taking his medication appropriately  Subsequently he went to acute inpatient rehab and was discharged on July 5th  Since that time he has had a caregiver at home 5 hours per day  He lives in his home in [place] with his girlfriend of 48 years, Concepcion Garcia  A week and a half ago Concepcion Garcia apparently fell and was taken by ambulance and caregivers have not been able to reach her since that time  Apparently, Concepcion Garcia is daughter is listed as power of , however case management and home agency have not been able to reach her by phone either  I met with patient briefly this morning when he was alone in his room and he complained that he needed to have a bowel movement and wanted some ice chips  After he had a small bowel movement I returned to the room for a team meeting including his , Elsi Bradley from the home care agency and his daughter Bo Mckinney  During that meeting, Alexei Marroquin proved to be alert and oriented to person, place, date, president, his home address and to some degree this situation  However, he was unable to repeat to me the reason that he was in the hospital with regards to his pneumonia and the implications with aspiration and pneumonia  He would repeatedly ask for ice chips and a cold glass of water  He complains that his head is not right  He states that he is not thinking clearly and his vision is blurry  He denies any headache or dizziness  He denies any chest pain  He states that his breathing is fine and he denies cough or shortness of breath  He denies any abdominal pain, nausea, vomiting  He states he is not hungry but  He had a small bowel movement today  He has a [employer] catheter in place  He complains of pain in his sacrum but otherwise denies any pain  Elsi Bradley states that patient has been writing checks out of his checkbook to cover their home services and that they would be able to provide 24 hour care within the home  Additionally he is active with [facility] nursing and physical therapy        Prior Hospitalizations:  1    Palliative Care Social History    Social History: Living situation As above  Episcopal/Cultural/Spiritual: No spiritual concerns identified    Vitals: Blood pressure 119/58, pulse 87, temperature 98 3 °F (36 8 °C), temperature source Oral, resp  rate 17, height 5' 6" (1 676 m), weight 47 kg (103 lb 9 9 oz), SpO2 93 %  ,Body mass index is 16 72 kg/m²    SPO2 RA Rest      ED to Hosp-Admission (Current) from 7/20/2018 in [address] [place] [address] 2nd Floor Med Surg Unit   SpO2  93 %   SpO2 Activity  At Rest   O2 Device  Nasal cannula   O2 Flow Rate  5 L/min        I&O:   Intake/Output Summary (Last 24 hours) at 07/25/18 1047  Last data filed at 07/25/18 0343   Gross per 24 hour   Intake             1075 ml   Output              650 ml   Net              425 ml       Historical Information   Past Medical History:   Diagnosis Date    AAA [facility]abdominal [facility] aneurysm) ([facility]     Arthritis     CAD (coronary artery disease)     CHF [facility]congestive heart failure) ([facility] [address] ) 06/19/2018    CKD (chronic kidney disease), stage III     COPD (chronic obstructive pulmonary disease) ([facility])     Hypertension     Hypertensive nephrosclerosis     Ischemic cardiomyopathy     Pleural effusion due to CHF (congestive heart failure) ([facility])     Pulmonary HTN ([facility])      Past Surgical History:   Procedure Laterality Date    CARDIAC PACEMAKER PLACEMENT       Social History   History   Alcohol Use    0 6 oz/week    1 Glasses of wine per week     Comment: daily with dinner     History   Drug Use No     History   Smoking Status    Former Smoker    Packs/day: 0 00    Types: Cigarettes    Quit date: 1/1/2016   Smokeless Tobacco    Never Used     Family History   Problem Relation Age of Onset    Coronary artery disease Father        Meds/Allergies   No Known Allergies    Meds:  Current Facility-Administered Medications   Medication Dose Route Frequency Provider Last Rate Last Dose    acetaminophen (TYLENOL) tablet 650 mg 650 mg Oral Q6H PRN Anisa Duvall MD        aspirin (ECOTRIN LOW STRENGTH) EC tablet 81 mg  81 mg Oral Daily Farida Bridge, DO   81 mg at 07/22/18 2714    aspirin chewable tablet 81 mg  81 mg Oral Once Tito Land MD        aspirin tablet 325 mg  325 mg Oral Once Anisa Duvall MD        cefuroxime (CEFTIN) tablet 500 mg  500 mg Oral Q24H Farida Bridge, DO        dextrose 5 % and sodium chloride 0 9 % with KCl 20 mEq/L infusion (premix)  75 mL/hr Intravenous Continuous Farida Bridge, DO 75 mL/hr at 07/25/18 0343 75 mL/hr at 07/25/18 0343    enoxaparin (LOVENOX) subcutaneous injection 30 mg  30 mg Subcutaneous Daily Anisa Duvall MD   30 mg at 07/24/18 1026    meclizine (ANTIVERT) tablet 25 mg  25 mg Oral Q8H PRN Farida Bridge, DO        megestrol (MEGACE) tablet 20 mg  20 mg Oral 4x Daily Anisa Duvall MD   20 mg at 07/23/18 2258    potassium chloride (K-DUR,KLOR-CON) CR tablet 40 mEq  40 mEq Oral Daily Farida Bridge, DO   40 mEq at 07/22/18 5903    pravastatin (PRAVACHOL) tablet 40 mg  40 mg Oral Daily With Ben Jacobo MD   40 mg at 07/23/18 1810      Prescriptions Prior to Admission   Medication    aspirin (ASPIRIN LOW DOSE) 81 MG tablet    carvedilol (COREG) 3 125 mg tablet    colchicine (COLCRYS) 0 6 mg tablet    furosemide (LASIX) 80 mg tablet    hydrALAZINE (APRESOLINE) 25 mg tablet    isosorbide dinitrate (ISORDIL) 20 mg tablet    meclizine (ANTIVERT) 25 mg tablet    megestrol (MEGACE) 20 mg tablet    potassium chloride (K-DUR,KLOR-CON) 20 mEq tablet         Physical Exam:        General Appearance:    Alert, cooperative, cachectic and chronically ill-appearing but no distress   Head:    Normocephalic, without obvious abnormality, atraumatic   Eyes:    PERRL, conjunctiva/corneas clear, EOM's intact       Mouth:   Moist mucous dry, no sinus tenderness or drainage   Throat:   No tenderness, no exudates   Neck:   Supple, symmetrical, trachea midline, no JVD   Lungs: Patient would not sit up due to pain in sacrum, diminished breath sounds noted at left base but otherwise clear anteriorly, respirations unlabored   Heart:    Irregular with 2/6 systolic murmur   Abdomen: Soft, non-tender, bowel sounds active all four quadrants,     no masses, no organomegaly  Extremities:  No pedal edema, calf tenderness  Neurologic:     Speech is slow and purposeful but appropriate, alert and oriented to person, place, date and situation     Labs:    Results from last 7 days  Lab Units 07/25/18  0516 07/24/18  0504 07/23/18  0503  07/20/18  1407   WBC Thousand/uL 9 99 9 84 11 39*  < > 26 97*   HEMOGLOBIN g/dL 14 4 13 8 15 2  < > 16 6   I STAT HEMOGLOBIN   --   --   --   < >  --    HEMATOCRIT % 45 5 42 7 47 9  < > 51 6*   PLATELETS Thousands/uL 110* 105* 106*  < > 127*   LYMPHO PCT %  --   --   --   --  0*   MONO PCT MAN %  --   --   --   --  2*   EOSINO PCT MANUAL %  --   --   --   --  0   < > = values in this interval not displayed  Results from last 7 days  Lab Units 07/25/18  0516 07/24/18  0504 07/23/18  0503  07/20/18  1407   SODIUM mmol/L 145 141 143  < > 146*   POTASSIUM mmol/L 3 5 3 2* 3 4*  < > 3 7   CHLORIDE mmol/L 109* 105 105  < > 103   CO2 mmol/L 30 32 32  < > 36*   BUN mg/dL 50* 57* 59*  < > 56*   CREATININE mg/dL 1 67* 1 80* 1 92*  < > 2 19*   CALCIUM mg/dL 8 8 8 9 9 0  < > 9 0   TOTAL PROTEIN g/dL  --   --   --   --  7 2   BILIRUBIN TOTAL mg/dL  --   --   --   --  2 10*   ALK PHOS U/L  --   --   --   --  59   ALT U/L  --   --   --   --  11*   AST U/L  --   --   --   --  22   GLUCOSE RANDOM mg/dL 126 97 113  < > 58*   GLUCOSE, ISTAT   --   --   --   < >  --    < > = values in this interval not displayed  Lab Results   Component Value Date    TROPONINI 0 03 06/22/2018    TROPONINI < 0 03 04/18/2018    CKTOTAL 79 06/22/2018       Results from last 7 days  Lab Units 07/20/18  1407   INR  1 49*     Lab Results   Component Value Date    BLOODCX No Growth After 4 Days  07/20/2018    BLOODCX No Growth After 4 Days  07/20/2018    URINECX No Growth <1000 cfu/mL 07/21/2018       Imaging:  Results for orders placed during the hospital encounter of 07/20/18   XR chest 1 view portable    Narrative CHEST     INDICATION:   hypoxia  COMPARISON:  7/3/2018    EXAM PERFORMED/VIEWS:  XR CHEST PORTABLE      FINDINGS:  Left-sided chest wall pacemaker is identified  Pacemaker leads are intact  Heart shadow is obscured by adjacent opacity  Persistent bibasilar opacities with moderate to large pleural effusions, minimally improved from the prior exam   Central vascular congestion  Osseous structures appear within normal limits for patient age  Impression Persistent bibasilar opacities with moderate to large pleural effusions, minimally improved from the prior exam   Central vascular congestion  Workstation performed: [id number]       Results for orders placed during the hospital encounter of 06/22/18   XR chest pa & lateral    Narrative INDICATION:  Weakness  ORDERING PROVIDER: Gilford Lana  TECHNIQUE:  Frontal and lateral chest      COMPARISON:  4/18/18 XR  FINDINGS:    The cardiomediastinal silhouette is enlarged  Left-sided single lead ICD  pacemaker is in place  Bibasilar air space opacities are demonstrated  with small bilateral effusions and likely mild vascular congestion  There  is no pneumothorax  No acute osseous process  IMPRESSION:  Bibasilar airspace opacities with small effusions and mild pulmonary  vascular congestion          Signed by Jennifer Brock MD       Admitting Diagnosis:   Principal Problem:    Sepsis ([address] )  Active Problems:    Dilated cardiomyopathy ([address] )    ICD (implantable cardioverter-defibrillator) in place    Acute on chronic renal insufficiency    Acute kidney injury superimposed on chronic kidney disease (HCC)    Atrial fibrillation (HCC)    COPD (chronic obstructive pulmonary disease) (HCC)    TIA (transient ischemic attack)    CAD (coronary artery disease)    Moderate protein-calorie malnutrition (HCC)    Debility    Vision loss, bilateral  Resolved Problems:    * No resolved hospital problems  *      Plan  Based upon chart review and my interview and examination with patient today it does appear that his mental state has improved since Dr Valeriy Abdi note yesterday  We met at the bedside for more than 45 minutes and attempted to clarify Jeff's goals of care  He is unable to tell me whether he wants to go home or into a skilled nursing facility  He is having difficulty with the concept of his dysphagia and aspiration risk  We briefly discussed the pros and cons of having a PEG tube for feeding  We discussed the option of going home with a hired agency and home care versus hospice  At this point, no decisions were made  I left Cass Caceres and his daughter, Lydia Morales to get reacquainted  I will follow up tomorrow to help readdress goals of care  Case discussed with Dr Arnulfo Clayton  I would recommend reassessment by speech therapy as he appears to be more lucid today and may be able to have a less restrictive diet recommended  Continue with wound care recommendations for sacral and back wound  At this point, I would recommend continuing Tylenol as needed for pain to avoid any potential change in mental state that could occur with initiation of narcotic analgesics         95 minutes were spent with this patient today including chart review, bedside interview and examination and discussion with case management, home agency, daughter and primary team     John Weiss MD  7/25/2018,10:47 AM

## 2018-07-25 NOTE — ESCALATED TEAM TX
Care Team Meeting:    Attended by Dr Braeden Pedroza RN/CM, patient, daughter Tyson Isidro of [address] [place] [address]  Dr Jennifer Horn asked pt how he was doing and pt responded that he was not well-c/o dizziness, felt his head did not feel clear, vision difficulties, and his head was spinning  He was able to answer questions appropriately in a halting speech pattern  He denied SOB, chest pain, abd pain  States he has no appetite, that he takes his meds regularly, that he lives with his girlfriend Janie Mosqueda and her daughter in [place]  He also knew that it was July 2108  Dr Jennifer Horn explained that pt's symptoms were probably due to his infection that resulted from aspiration pneumonia-from food and liquid going into the lungs  Dr Jennifer Horn reviewed d/c options including going home with a 24 hr caregiver, STR, or comfort care where he would not return to the hospital for treatment  Pt agrees to placement on d/c to either an acute or STR facility  [facility] choice [facility] be [facility], where he was before or [facility] will place referrals and keep pt and daughter informed of acceptance  CM asked pt who his POA was and he replied it was [place] and Zara's daughter  Pt is current with [facility] and a home aide from [address] [place] [address] explained that her agency could provide 24 hr care if pt wanted to return home on d/c  Jason Comfort is in agreement with whatever her father wishes  Dr Jennifer Horn also wants pt to consider a temporary vs permanent feeding tube  Will await eval of speech therapist to see if there has been any improvement  Jason Elham lives in [place] and has not been in contact with her father in 3 years    She can be reached at [phone] or [phone]

## 2018-07-25 NOTE — OCCUPATIONAL THERAPY NOTE
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 ([facility] )    CKD (chronic kidney disease), stage III    Atrial fibrillation ([facility]    COPD (chronic obstructive pulmonary disease) ([facility]    TIA (transient ischemic attack)    Pulmonary HTN ([facility]    AAA (abdominal aortic aneurysm) ([facility] )    CAD (coronary artery disease)    Hypertensive nephrosclerosis    Anorexia    Moderate protein-calorie malnutrition (HCC)    Acute on chronic systolic CHF (congestive heart failure) ([facility]    Debility    Dysphagia    Sepsis ([facility] )    Vision loss, bilateral       Past Medical History:   Diagnosis Date    AAA (abdominal aortic aneurysm) (HCC)     Arthritis     CAD (coronary artery disease)     CHF (congestive heart failure) ([facility] ) 06/19/2018    CKD (chronic kidney disease), stage III     COPD (chronic obstructive pulmonary disease) ([facility]     Hypertension     Hypertensive nephrosclerosis     Ischemic cardiomyopathy     Pleural effusion due to CHF (congestive heart failure) ([facility] )     Pulmonary HTN ([facility] )        Past Surgical History:   Procedure Laterality Date    CARDIAC PACEMAKER PLACEMENT        07/25/18 1340   Note Type   Note type Eval/Treat   Restrictions/Precautions   Weight Bearing Precautions Per Order No   Braces or Orthoses Other (Comment)  (None)   Other Precautions Cognitive; Bed Alarm; Chair Alarm; Fall Risk;O2  (3 L O2 via NC)   Pain Assessment   Pain Assessment No/denies pain   Pain Score No Pain   Home Living   Type of Home House   Home Layout Two level;Bed/bath upstairs;Stairs to enter with rails   Bathroom Shower/Tub Tub/shower unit   Bathroom Toilet Standard   Bathroom Accessibility Accessible   Home Equipment Walker   Prior Function   Level of [place] Needs assistance with IADLs; Needs assistance with ADLs and functional mobility   Lives With Significant other   Receives Help From Home health   ADL Assistance Needs assistance   IADLs Needs assistance   Vocational Retired   Lifestyle   Autonomy Pt was a questionable historian  He was also unable answer many questions posed of him  Social history taken from chart mateo  Pt lived with SO in a  [address] [place] [address] with SAVITA  Reciprocal Relationships SO   Psychosocial   Psychosocial (WDL) X   Patient Behaviors/Mood [place] affect   Subjective   Subjective "I'm not sure where I am"   ADL   Eating Assistance 5  Supervision/Setup   Eating Deficit Setup;Supervision/safety; Increased time to complete   Grooming Assistance 4  Minimal Assistance   Grooming Deficit Setup;Steadying; Increased time to complete;Supervision/safety   UB Bathing Assistance 3  Moderate Assistance   UB Bathing Deficit Setup;Steadying;Supervision/safety; Increased time to complete;Verbal cueing   LB Bathing Assistance 2  Maximal Assistance   LB Bathing Deficit Setup;Steadying; Increased time to complete;Supervision/safety   UB Dressing Assistance 3  Moderate Assistance   UB Dressing Deficit Setup;Steadying;Supervision/safety; Increased time to complete;Verbal cueing   LB Dressing Assistance 2  Maximal Assistance   LB Dressing Deficit Setup;Steadying;Supervision/safety; Increased time to complete   Toileting Assistance  2  Maximal Assistance  (cath)   Toileting Deficit Setup;Steadying; Increased time to complete;Supervison/safety   Functional Assistance 2  Maximal Assistance   Functional Deficit Setup;Steadying;Verbal cueing;Supervision/safety; Increased time to complete   Bed Mobility   Rolling R 2  Maximal assistance   Additional items Assist x 1; Increased time required;Verbal cues   Transfers   Sit to Stand Unable to assess   Additional Comments pt declined getting oob and stated he just needed to stay there   Functional Mobility   Additional Comments Pt declined getting oob   Balance   Static Sitting Good   Dynamic Sitting Fair +   Static Standing Fair -   Dynamic Standing Poor +   Activity Tolerance   Activity Tolerance Patient limited by fatigue   Nurse Made Aware Yes, Arely Andujar, RN verbalized pt appropriate for OT evaluation   RUE Assessment   RUE Assessment X  (? chronic rotator cuff tear in right shoulder)   RUE Strength   RUE Overall Strength Other (Comment)  (to at least 4/5 based on functional assessment)   LUE Assessment   LUE Assessment WFL   LUE Strength   LUE Overall Strength Other (Comment)  (to at least 4/5 based on functional assessment)   Hand Function   Gross Motor Coordination Impaired   Fine Motor Coordination Functional   Sensation   Light Touch No apparent deficits   Sharp/Dull No apparent deficits   Stereognosis No apparent deficits   Proprioception   Proprioception No apparent deficits   Vision-Basic Assessment   Current Vision No visual deficits   Vision - Complex Assessment   Ocular Range of Motion [facility]   Perception   Inattention/Neglect Appears intact   Cognition   Overall Cognitive Status Impaired   Arousal/Participation Lethargic;Uncooperative; Cooperative;Poorly responsive   Attention Difficulty attending to directions   Orientation Level Disoriented to place; Disoriented to time;Oriented to person;Disoriented to situation   Memory Decreased recall of recent events   Following Commands Follows one step commands inconsistently   Comments Pt was agreeable to OT eval   Assessment   Limitation Decreased ADL status; Decreased UE ROM; Decreased UE strength;Decreased Safe judgement during ADL;Decreased cognition;Decreased endurance;Decreased self-care trans;Decreased high-level ADLs   Prognosis Guarded   Assessment Pt is a 80 y o  male seen for OT evaluation s/p admit to [facility] on 7/20/2018 w/ Sepsis ([address] )   Performed at least 2 patient identifiers during session: Name and wristband  Comorbidities affecting pt's functional performance at time of assessment include:KASANDRA, CAD, COPD, CKD, HTN and anorexia, vision loss, TIA, pulm htn, AAA   Personal factors affecting pt at time of IE include:steps to enter environment, limited home support, behavioral pattern, difficulty performing ADLS, difficulty performing IADLS , limited insight into deficits, compliance, decreased initiation and engagement , financial barriers, health management  and environment  Prior to admission, chart review reflects pt requiring A with ADLS, IADLS, and A for transport  CR states pt had home care to A  Upon evaluation: Pt requires mod A with UB ADLs, max A with LB ADLs, and pt declined xfers and functional mobility at this time  Limitations 2* the following deficits impacting occupational performance: weakness, decreased strength, decreased balance, decreased tolerance, impaired arousal, impaired attention, impaired initiation, impaired memory, impaired sequencing, impaired problem solving, decreased safety awareness, impaired interpersonal skills, decreased coping skills, decreased mobilty and environmental deficits  Pt to benefit from continued skilled OT tx while in the hospital to address deficits as defined above and maximize level of functional independence w ADL's and functional mobility  Occupational Performance areas to address include: grooming, bathing/shower, toilet hygiene, dressing, medication management, socialization, health maintenance, functional mobility, community mobility, clothing management, cleaning, meal prep, money management, household maintenance and social participation  From OT standpoint, recommendation at time of d/c would be STR  Goals   Patient Goals "to get home"   Plan   Treatment Interventions ADL retraining;Functional transfer training;UE strengthening/ROM; Endurance training;Cognitive reorientation;Patient/family training;Equipment evaluation/education; Compensatory technique education;Continued evaluation;Cardiac education; Energy conservation; Activityengagement   Goal Expiration Date 08/07/18   OT Frequency 3-5x/wk   Recommendation   OT Discharge Recommendation Short Term Rehab   OT - OK to Discharge Yes  (when medically cleared)   Barthel Index   Feeding 5   Bathing 0   Grooming Score 5   Dressing Score 5   Bladder Score 0   Bowels Score 10   Toilet Use Score 5   Transfers (Bed/Chair) Score 5   Mobility (Level Surface) Score 0   Stairs Score 0   Barthel Index Score 35   Modified [place] Scale   Modified [place]ugach Scale 5   Occupational Therapy goals: In 7-14 days:      1 - Patient will increase OOB/ sitting tolerance to 2-4 hours per day for increased participation in self care and leisure tasks with no s/s of exertion  2 - Patient will increase sitting tolerance at edge of bed to 20 minutes to complete UB ADLs @ min assist level  3 - Patient will verbalize and demonstrate weight shifting technique in bed and sitting position with 10% verbal cues    4 - Patient will complete self feeding task with set up assist    5 - Patient will complete rolling to R/L with use of side rail and min assist x1      6 - Patient will complete grooming task with set up assist      7 - Patient will increase standing tolerance time to 3-5 minutes with unilateral UE support to complete sink level ADLs@ min assist level    8 - Patient will follow 100% simple one step directives without verbal cues  9 - Patient will transfer bed to Chair / toilet with min assist with AD as indicated  10 - Patient will complete UB ADLs with set up assist    11 - Patient will complete LB ADLs with min assist with the use of adaptive equipment  12 - Patient will complete toileting hygiene with mod assist assist/ supervision for thoroughness      15 - Patient/ Family will demonstrate competency with UE Home Exercise Program     14 - Patient will complete Interest checklist to explore participation in play/ leisure tasks for increased satisfaction and quality of life        Hang Chowdhury MS OTR/L

## 2018-07-25 NOTE — PLAN OF CARE
Problem: DISCHARGE PLANNING - CARE MANAGEMENT  Goal: Discharge to post-acute care or home with appropriate resources  INTERVENTIONS:  - Conduct assessment to determine patient/family and health care team treatment goals, and need for post-acute services based on payer coverage, community resources, and patient preferences, and barriers to discharge  - Address psychosocial, clinical, and financial barriers to discharge as identified in assessment in conjunction with the patient/family and health care team  - Arrange appropriate level of post-acute services according to patients   needs and preference and payer coverage in collaboration with the physician and health care team  - Communicate with and update the patient/family, physician, and health care team regarding progress on the discharge plan  - Arrange appropriate transportation to post-acute venues   Outcome: Progressing  Care Team Meeting:    Attended by Dr Heike Martinez RN/CM, patient, daughter Vita Wiley of [address] [place] [address]  Dr Angélica Gautam asked pt how he was doing and pt responded that he was not well-c/o dizziness, felt his head did not feel clear, vision difficulties, and his head was spinning  He was able to answer questions appropriately in a halting speech pattern  He denied SOB, chest pain, abd pain  States he has no appetite, that he takes his meds regularly, that he lives with his girlfriend Gloria James and her daughter in [place]  He also knew that it was July 2108  Dr Angélica Gautam explained that pt's symptoms were probably due to his infection that resulted from aspiration pneumonia-from food and liquid going into the lungs  Dr Angélica Gautam reviewed d/c options including going home with a 24 hr caregiver, STR, or comfort care where he would not return to the hospital for treatment  Pt agrees to placement on d/c to either an acute or STR facility  [facility] choice would be [facility], where he was before or [facility] will place referrals and keep pt and daughter informed of acceptance  CM asked pt who his POA was and he replied it was [place] and Zara's daughter  Pt is current with [facility] and a home aide from [facility]  Red Maia explained that her agency could provide 24 hr care if pt wanted to return home on d/c  Tyree Chirinos is in agreement with whatever her father wishes  Dr Miguel Ángel Harvey also wants pt to consider a temporary vs permanent feeding tube  Will await eval of speech therapist to see if there has been any improvement  Tyree Chirinos lives in [place] and has not been in contact with her father in 3 years    She can be reached at [phone] or [phone]

## 2018-07-26 PROBLEM — H54.3 VISION LOSS, BILATERAL: Status: RESOLVED | Noted: 2018-07-20 | Resolved: 2018-07-26

## 2018-07-26 PROBLEM — A41.9 SEPSIS (HCC): Status: RESOLVED | Noted: 2018-07-20 | Resolved: 2018-07-26

## 2018-07-26 PROCEDURE — 99239 HOSP IP/OBS DSCHRG MGMT >30: CPT | Performed by: INTERNAL MEDICINE

## 2018-07-26 PROCEDURE — 99232 SBSQ HOSP IP/OBS MODERATE 35: CPT | Performed by: INTERNAL MEDICINE

## 2018-07-26 RX ORDER — CEFUROXIME AXETIL 500 MG/1
500 TABLET ORAL EVERY 24 HOURS
Qty: 4 TABLET | Refills: 0
Start: 2018-07-26 | End: 2018-07-30

## 2018-07-26 RX ORDER — FUROSEMIDE 20 MG/1
20 TABLET ORAL 2 TIMES DAILY
Start: 2018-07-26

## 2018-07-26 RX ORDER — CARVEDILOL 3.12 MG/1
3.12 TABLET ORAL 2 TIMES DAILY WITH MEALS
Status: DISCONTINUED | OUTPATIENT
Start: 2018-07-26 | End: 2018-07-26 | Stop reason: HOSPADM

## 2018-07-26 RX ADMIN — MEGESTROL ACETATE 20 MG: 40 TABLET ORAL at 12:06

## 2018-07-26 RX ADMIN — POTASSIUM CHLORIDE 40 MEQ: 1500 TABLET, EXTENDED RELEASE ORAL at 09:24

## 2018-07-26 RX ADMIN — MEGESTROL ACETATE 20 MG: 40 TABLET ORAL at 09:24

## 2018-07-26 RX ADMIN — CEFUROXIME AXETIL 500 MG: 250 TABLET ORAL at 10:58

## 2018-07-26 RX ADMIN — ENOXAPARIN SODIUM 30 MG: 30 INJECTION SUBCUTANEOUS at 09:24

## 2018-07-26 RX ADMIN — ASPIRIN 81 MG: 81 TABLET, COATED ORAL at 09:24

## 2018-07-26 NOTE — DISCHARGE SUMMARY
Discharge Summary - [address] [facility]    Patient Information: Camacho Ovalle 80 y o  male MRN: [id number]  Unit/Bed#: -01 Encounter: [id number]    Discharging Physician / Practitioner: Jorge Alberto Montgomery MD  PCP: Rena Yip MD  Admission Date: 7/20/2018  Discharge Date: 07/26/18    Disposition:     Other: Short-term rehab at [facility]    Reason for Admission:  Acute encephalopathy    Discharge Diagnoses:     Principal Problem (Resolved):    Sepsis ([facility] )  Active Problems:    Dilated cardiomyopathy ([facility] )    ICD (implantable cardioverter-defibrillator) in place    Acute on chronic renal insufficiency    CKD (chronic kidney disease), stage III    Atrial fibrillation ([facility] )    COPD (chronic obstructive pulmonary disease) ([facility] )    TIA (transient ischemic attack)    CAD (coronary artery disease)    Moderate protein-calorie malnutrition ([facility] )    Debility  Resolved Problems:    Acute kidney injury superimposed on chronic kidney disease ([facility] )    Vision loss, bilateral      Consultations During Hospital Stay:  · Palliative  · Wound care    Procedures Performed:     · none    Significant Findings / Test Results:   CT of the head without contrast-no acute intracranial abnormality    CT of the chest without contrast  Large right and moderate size left pleural effusions   Bibasilar consolidations present, which likely represents compressive atelectasis though underlying pneumonia cannot be excluded    CT of the head without contrast  No acute intracranial abnormality       Chronic small and large vessel ischemic changes, similar from MRI of September 2016  Incidental Findings:   · None    Test Results Pending at Discharge (will require follow up):    · None     Outpatient Tests Requested:  · None    Complications:  None    Hospital Course:     Camacho Ovalle is a 80 y o  male patient with past medical history of hypertension, chronic systolic heart failure status post ICD, Chronic obstructive pulmonary disease, CKD, chronic AFib who originally presented to the hospital on 7/20/2018 due to concern of visual impairment as per caregivers at home  As per caregivers patient had bilateral visual impairment at home, however completely resolved on presentation  He did not have any other deficits/weakness/paresthesias  CT of the head on admission negative for acute abnormality  He was also found to have elevated WBC of 26 K, and was hypotensive  He met sepsis criteria  with tachycardia and leukocytosis  Chest x-ray without consolidation  However CT of the chest did show  Bilateral consolidations with differentials of pneumonia/atelectasis and underlying pleural effusions  Patient was initially empirically started on broad-spectrum IV antibiotics later tapered to oral Ceftin  Blood cultures x2 negative  Urine cultures showed no growth  During the course of hospitalization patient had change in mental status for which again extensive workup has been performed  ABG did not show any carbon dioxide retention  CT of the head showed no evidence of acute intracranial pathology  However patient mental status improved with gentle IV fluid hydration  Due to multiple comorbidities extremely poor prognosis, failure to thrive and patient himself expressing 2 days ago that he just wished to die palliative care was consulted for addressing goals of care  By the time patient's mental status improved, returned to baseline  Goals of care has been extensively discussed with patient and his daughter by the palliative care  Patient did express that he did not wanted to pursue hospice at this time and would like to be treated medically and return back to home  He was evaluated by PT/OT recommended short-term rehab, later patient transferred to the same  Acute encephalopathy- may be secondary to dehydration  Mental status improved, returned back to baseline    Currently patient is alert, oriented x3, appropriately answering questions  At baseline per caregiver     Sepsis/SIRS sec to aspiration pneumonia  Negative urine cultures, chest x-ray without consolidation  CT of the chest noted bibasilar consolidation pneumonia  Patient was initially on IV antibiotics later changed to Ceftin  He was discharged to complete 4 more days of Ceftin    Chronic respiratory failure on home oxygen 2 L  Continue oxygen supplementation     Chronic systolic heart failure  Ejection fraction 35%, ICD in place  Compensated  Lasix and Coreg restarted on low-dose  Imdur, hydralazine on hold secondary to low blood pressure  To have close follow-up with PCP/Cardiology after discharge     Right big toe pain  Consistent with acute gout, elevated uric acid  status post colchicine, improved     Chronic AFib  Rate controlled  Chronically not on anticoagulation  Continue aspirin     Severe protein calorie malnutrition  Nutritional supplements  Speech evaluation     Chronic kidney disease stage IV  Creatinine at baseline      History of coronary artery disease  On aspirin, statin   imdur held secondary to low BP  Coreg restarted today  Sacral ulcers in the back-POA  Continue Wound care    Condition at Discharge: stable     Discharge Day Visit / Exam:     Subjective:  Patient seen and examined  He is significantly improved compared to yesterday  Currently alert, oriented x3  Able to answer questions appropriately  He did eat breakfast this morning by himself  Vitals: Blood Pressure: 130/64 (07/25/18 2300)  Pulse: 96 (07/25/18 2300)  Temperature: 98 2 °F (36 8 °C) (07/25/18 2300)  Temp Source: Oral (07/25/18 2300)  Respirations: 18 (07/25/18 2300)  Height: 5' 6" (167 6 cm) (07/20/18 1600)  Weight - Scale: 47 kg (103 lb 9 9 oz) (07/20/18 1600)  SpO2: 96 % (07/25/18 2300)  Exam:   Physical Exam   Constitutional: He is oriented to person, place, and time  No distress  Cachectic appearing   HENT:   Head: Atraumatic     Eyes: EOM are normal  Pupils are equal, round, and reactive to light  Neck: Normal range of motion  Neck supple  Cardiovascular: Normal rate  Irregular   Pulmonary/Chest: Effort normal and breath sounds normal    Abdominal: Soft  Bowel sounds are normal    Musculoskeletal: Normal range of motion  Neurological: He is alert and oriented to person, place, and time  Skin: Skin is warm and dry  Discussion with Family:  Daughter    Discharge instructions/Information to patient and family:   See after visit summary for information provided to patient and family  Provisions for Follow-Up Care:  See after visit summary for information related to follow-up care and any pertinent home health orders  Planned Readmission:  None     Discharge Statement:  I spent 45 minutes discharging the patient  This time was spent on the day of discharge  I had direct contact with the patient on the day of discharge  Greater than 50% of the total time was spent examining patient, answering all patient questions, arranging and discussing plan of care with patient as well as directly providing post-discharge instructions, talking to case management and palliative  Additional time then spent on discharge activities  Discharge Medications:  See after visit summary for reconciled discharge medications provided to patient and family        ** Please Note: This note has been constructed using a voice recognition system **

## 2018-07-26 NOTE — PROGRESS NOTES
Tried to give report at [facility] facility, not able to reach nurse  Name and phone number given for them to call back, this was per mariano  Waiting for phone call to give report at this time       Marvin Saldaña RN  07/26/18  12:53 PM

## 2018-07-26 NOTE — PLAN OF CARE
Problem: DISCHARGE PLANNING - CARE MANAGEMENT  Goal: Discharge to post-acute care or home with appropriate resources  INTERVENTIONS:  - Conduct assessment to determine patient/family and health care team treatment goals, and need for post-acute services based on payer coverage, community resources, and patient preferences, and barriers to discharge  - Address psychosocial, clinical, and financial barriers to discharge as identified in assessment in conjunction with the patient/family and health care team  - Arrange appropriate level of post-acute services according to patient's   needs and preference and payer coverage in collaboration with the physician and health care team  - Communicate with and update the patient/family, physician, and health care team regarding progress on the discharge plan  - Arrange appropriate transportation to post-acute venues   Outcome: Completed Date Met: 07/26/18  Cm met with pt and Dr Betty Warren at bedside  Pt is agreeable to [facility] for STR on d/c  Caregiver Nat Lemus notified and CM left message on voicemail with daughter Jerrica Young  Transport set up with [facility] for 14:00  Mariama Cortes from [facility] was requesting the 14:00 time  IMM reviewed and signed  Copy to pt and copy to MR for scanning  Medical Nec form completed and faxed to [facility]

## 2018-07-26 NOTE — SOCIAL WORK
Cm met with pt and Dr Loren Olivas at bedside  Pt is agreeable to [facility] for STR on d/c  Caregiver Moisés Ruiz notified and CM left message on voicemail with daughter Lydia Morales  Transport set up with [employer] for 14:00  Danya Bravo from [facility] was requesting the 14:00 time  IMM reviewed and signed  Copy to pt and copy to MR for scanning  Medical Nec form completed and faxed to [facility]

## 2018-07-26 NOTE — DISCHARGE INSTR - DIET
Dysphagia 2/Mechanical Soft and Nectar-Thick Liquid diet  NO STRAWS  Meds crushed  Setup assistance with trays  Aspiration precautions including upright positioning for all PO intake  Recommend continued speech therapy for dysphagia treatment

## 2018-07-26 NOTE — PLAN OF CARE
Absence of cardiac dysrhythmias or at baseline rhythm Progressing      Ability to express needs and understand communication Progressing      Discharge to post-acute care or home with appropriate resources Progressing      Maintains or returns to baseline urinary function Progressing      Absence of urinary retention Progressing      Absence or prevention of progression during hospitalization Progressing      Patient/family/caregiver demonstrates understanding of disease process, treatment plan, medications, and discharge instructions Progressing      Nutrient/Hydration intake appropriate for improving, restoring or maintaining nutritional needs Progressing      Verbalizes/displays adequate comfort level or baseline comfort level Progressing      Patient will remain free of falls Progressing      Skin integrity is maintained or improved Progressing      Achieves optimal ventilation and oxygenation Progressing      Incision(s), wounds(s) or drain site(s) healing without S/S of infection Progressing

## 2021-03-16 NOTE — CONSULTS
Physical Therapy Progress Note      03/16/21 1402   PT Last Visit   PT Visit Date 03/16/21   Note Type   Note Type Treatment   Pain Assessment   Pain Assessment Tool 0-10   Pain Score 9   Pain Location/Orientation Orientation: Bilateral;Location: Shoulder; Location: Neck   Hospital Pain Intervention(s) Repositioned; Other (Comment)  (increased activity)   Restrictions/Precautions   Braces or Orthoses C/S Collar  (multipodus boot LLE)   Other Precautions Bed Alarm;Multiple lines; Fall Risk;Pain  (kreg bed)   General   Chart Reviewed Yes   Response to Previous Treatment Patient with no complaints from previous session  Family/Caregiver Present No   Cognition   Overall Cognitive Status WFL   Arousal/Participation Alert; Cooperative   Comments Patient is pleasant and cooperative throughout session   Subjective   Subjective "My neck and shoulders really hurt"   Bed Mobility   Supine to Sit 1  Dependent   Additional items Assist x 2   Sit to Supine 1  Dependent   Additional items Assist x 2   Additional Comments BP assessed seated 115/75 with no complaints   Transfers   Additional Comments Patient transferred dependent x2, sat EOB max x1 30 minutes   Balance   Static Sitting Poor -   Dynamic Sitting Poor -   Endurance Deficit   Endurance Deficit Yes   Endurance Deficit Description Pain, weakness, fatigue   Activity Tolerance   Activity Tolerance Patient limited by fatigue;Patient limited by pain;Treatment limited secondary to medical complications (Comment)  (SCI)   Nurse Made Aware Appropriate to see per RN   Exercises   Hip Flexion Sitting;10 reps;AAROM; Bilateral   Hip Abduction Sitting;10 reps;AAROM; Bilateral   Knee AROM Long Arc Quad Sitting;10 reps;AAROM; Bilateral  (x5 AROM on L, trace movement with RLE x3 reps)   Balance training  Seated EOB BUE placed at side with max x1 to assist patient   Assessment   Prognosis Fair   Problem List Decreased strength;Decreased endurance; Impaired balance;Decreased mobility; Impaired Progress Note - Wound   Meaghan Alvarez 80 y o  male MRN: [id number]  Unit/Bed#: -01 Encounter: [id number]      Assessment:  Wound care consulted for assessment of pressure injuries noted on admission  Patient seen in bed, cooperative with assessment  Max assist of 2 with turning, dependent for care incontinent at baseline currently with condom cath however incontinent of stools  accu-max bed pump in place, per nursing P-500 is ordered and is pending arrival  Nutrition is following along with patient - patient is very frail appearing  B/l heels are intact with blanchable redness, lower extremities cool to touch, R great toe with area of scabbing  Recommend offloading of pressure and hydraguard cream     Slow to justin redness / hyperpigmentation noted to the bony sacral prominence which improved with offloading - no pressure injury  B/l ischium with intact blanchable hyperpigmentation and scarring  Stage 2 pressure injuries noted to Upper sacral area, L upper buttock, and 2 areas proximal and distal on the lower back bony spinal prominences  POA  All wounds are 100% beefy red and dry, edges flat attached intact with no maceration  No drainage  Vivi-wound is intact and fragile  Due to stool incontinence will recommend offloading and barrier cream     Stage 1 pressure injuries noted to R upper back  2 areas proximal and distal on the bony prominences of the posterior rib cage  POA  Wound beds are intact non-blanchable red skin  No open aspects noted  Recommend foam dressing to protect  No induration, fluctuance, redness, or purulence noted to the above noted wounds  New dressings applied  Patient tolerated well no pain with assessment  Primary nurse aware of plan of care  See flow sheets for more detailed assessment findings  Will follow along  Plan: 1  Cleanse b/l buttocks, sacrum and lower back with soap and water, pat dry, and apply calazime TID and PRN   2   Apply skin nourishing cream the entire sensation;Pain;Orthopedic restrictions   Assessment Patient lying supine in bed, agreeable and eager to participate in therapy  Patient dependent x2 with transfers, and requires max x 1 to sit EOB supported  Assisted patient with LE exercises, which he was able to perform AROM on L and trace movement on R  Patient sat EOB 30 minutes with BP assessed 115/75  Patient limited by pain in shivani shoulders and neck throughout session for increase tolerance  He would continue to benefit from skilled PT to maximize functional independence  Barriers to Discharge Inaccessible home environment;Decreased caregiver support   Goals   Patient Goals To get better   STG Expiration Date 03/20/21   PT Treatment Day 4   Plan   Treatment/Interventions Functional transfer training; Therapeutic exercise; Endurance training;Patient/family training;Bed mobility;Spoke to nursing;OT   Progress Slow progress, medical status limitations   PT Frequency   (3-5x a week)   Recommendation   PT Discharge Recommendation Post-Acute Rehabilitation Services   Equipment Recommended   (TBD)   PT - OK to Discharge Yes   Additional Comments to rehab when medically stable   AM-PAC Basic Mobility Inpatient   Turning in Bed Without Bedrails 1   Lying on Back to Sitting on Edge of Flat Bed 1   Moving Bed to Chair 1   Standing Up From Chair 1   Walk in Room 1   Climb 3-5 Stairs 1   Basic Mobility Inpatient Raw Score 6   Turning Head Towards Sound 4   Follow Simple Instructions 4   Low Function Basic Mobility Raw Score 14   Low Function Basic Mobility Standardized Score 22 01       Kristi Lee, PTA skin daily for moisture  3  Turn and reposition patient every  2 hours   4  Elevate heels off of bed with pillows to offload pressure   5  Soft care cushion to chair when OOB, if able  6  Apply alleyvn foam to b/l heels for prevention  Assess the skin every shift and as needed, change every 5 days and PRN  7  Apply alleyvn foam dressings to R back stage 1 pressure injuries  Change dressing every 3 days  Assess the skin daily and PRN  8  Wound care will follow along with patient weekly, please call with questions and concerns [phone]    Objective:    Vitals: Blood pressure 101/57, pulse 84, temperature 98 °F (36 7 °C), temperature source Axillary, resp  rate 20, height 5' 6" (1 676 m), weight 47 kg (103 lb 9 9 oz), SpO2 91 %  ,Body mass index is 16 72 kg/m²  Pressure Ulcer 07/23/18 Sacrum Mid;Upper stage 2  (Active)   Staging Stage II 7/23/2018  7:00 AM   Wound Description Dry; Intact; Beefy red 7/23/2018  7:00 AM   Vivi-wound Assessment Dry; Intact;Fragile 7/23/2018  7:00 AM   Shape round 7/23/2018  7:00 AM   Wound Length (cm) 0 5 cm 7/23/2018  7:00 AM   Wound Width (cm) 0 5 cm 7/23/2018  7:00 AM   Wound Depth (cm) 0 1 7/23/2018  7:00 AM   Calculated Wound Area (cm^2) 0 25 cm^2 7/23/2018  7:00 AM   Calculated Wound Volume (cm^3) 0 02 cm^3 7/23/2018  7:00 AM   Tunneling 0 cm 7/23/2018  7:00 AM   Undermining 0 7/23/2018  7:00 AM   Drainage Amount None 7/23/2018  7:00 AM   Treatment Cleansed;Elevated with pillow(s); Softcare cushion;Offload; Turn & reposition 7/23/2018  7:00 AM   Dressing Protective barrier 7/23/2018  7:00 AM   Wound packed? No 7/23/2018  7:00 AM   Packing- # removed 0 7/23/2018  7:00 AM   Packing- # inserted 0 7/23/2018  7:00 AM   Patient Tolerance Tolerated well 7/23/2018  7:00 AM   Dressing Status Open to air 7/23/2018  7:00 AM       Pressure Ulcer 07/23/18 Buttocks Left;Upper stage 2  (Active)   Staging Stage II 7/23/2018  7:00 AM   Wound Description Dry; Intact; Beefy red 7/23/2018  7:00 AM Vivi-wound Assessment Intact;Dry;Fragile 7/23/2018  7:00 AM   Shape round 7/23/2018  7:00 AM   Wound Length (cm) 1 cm 7/23/2018  7:00 AM   Wound Width (cm) 1 cm 7/23/2018  7:00 AM   Wound Depth (cm) 0 1 7/23/2018  7:00 AM   Calculated Wound Area (cm^2) 1 cm^2 7/23/2018  7:00 AM   Calculated Wound Volume (cm^3) 0 1 cm^3 7/23/2018  7:00 AM   Tunneling 0 cm 7/23/2018  7:00 AM   Undermining 0 7/23/2018  7:00 AM   Drainage Amount None 7/23/2018  7:00 AM   Treatment Cleansed;Elevated with pillow(s); Offload;Softcare cushion;Turn & reposition 7/23/2018  7:00 AM   Dressing Protective barrier 7/23/2018  7:00 AM   Wound packed? No 7/23/2018  7:00 AM   Packing- # removed 0 7/23/2018  7:00 AM   Packing- # inserted 0 7/23/2018  7:00 AM   Patient Tolerance Tolerated well 7/23/2018  7:00 AM   Dressing Status Open to air 7/23/2018  7:00 AM       Pressure Ulcer 07/23/18 Back Lower back x 2 areas = each measures 0 5x0 5x0 1cm (Active)   Staging Stage II 7/23/2018  7:00 AM   Wound Description Dry; Intact; Beefy red 7/23/2018  7:00 AM   Vivi-wound Assessment Intact;Dry;Fragile 7/23/2018  7:00 AM   Shape 2 round shaped areas  7/23/2018  7:00 AM   Wound Length (cm) 0 5 cm 7/23/2018  7:00 AM   Wound Width (cm) 0 5 cm 7/23/2018  7:00 AM   Wound Depth (cm) 0 1 7/23/2018  7:00 AM   Calculated Wound Area (cm^2) 0 25 cm^2 7/23/2018  7:00 AM   Calculated Wound Volume (cm^3) 0 02 cm^3 7/23/2018  7:00 AM   Tunneling 0 cm 7/23/2018  7:00 AM   Undermining 0 7/23/2018  7:00 AM   Drainage Amount None 7/23/2018  7:00 AM   Treatment Cleansed;Elevated with pillow(s); Offload;Softcare cushion;Turn & reposition 7/23/2018  7:00 AM   Dressing Protective barrier 7/23/2018  7:00 AM   Wound packed?  No 7/23/2018  7:00 AM   Packing- # removed 0 7/23/2018  7:00 AM   Packing- # inserted 0 7/23/2018  7:00 AM   Patient Tolerance Tolerated well 7/23/2018  7:00 AM   Dressing Status Open to air 7/23/2018  7:00 AM       Pressure Ulcer 07/23/18 Back Right;Upper;Proximal (Active)   Staging Stage I 7/23/2018  7:00 AM   Wound Description Non-blanchable erythema 7/23/2018  7:00 AM   Vivi-wound Assessment Dry; Intact;Fragile 7/23/2018  7:00 AM   Shape round 7/23/2018  7:00 AM   Wound Length (cm) 0 5 cm 7/23/2018  7:00 AM   Wound Width (cm) 0 5 cm 7/23/2018  7:00 AM   Wound Depth (cm) 0 7/23/2018  7:00 AM   Calculated Wound Area (cm^2) 0 25 cm^2 7/23/2018  7:00 AM   Calculated Wound Volume (cm^3) 0 cm^3 7/23/2018  7:00 AM   Tunneling 0 cm 7/23/2018  7:00 AM   Undermining 0 7/23/2018  7:00 AM   Drainage Amount None 7/23/2018  7:00 AM   Treatment Cleansed;Elevated with pillow(s); Offload;Softcare cushion;Turn & reposition 7/23/2018  7:00 AM   Dressing Foam, Silicone (eg  Allevyn, etc) 7/23/2018  7:00 AM   Dressing Changed New dressing applied 7/23/2018  7:00 AM   Wound packed? No 7/23/2018  7:00 AM   Packing- # removed 0 7/23/2018  7:00 AM   Packing- # inserted 0 7/23/2018  7:00 AM   Patient Tolerance Tolerated well 7/23/2018  7:00 AM   Dressing Status Clean;Dry; Intact 7/23/2018  7:00 AM       Pressure Ulcer 07/23/18 Back Right;Upper;Distal stage 1  (Active)   Staging Stage I 7/23/2018  7:00 AM   Wound Description Non-blanchable erythema 7/23/2018  7:00 AM   Vivi-wound Assessment Dry; Intact;Fragile 7/23/2018  7:00 AM   Shape round 7/23/2018  7:00 AM   Wound Length (cm) 1 cm 7/23/2018  7:00 AM   Wound Width (cm) 1 cm 7/23/2018  7:00 AM   Wound Depth (cm) 0 7/23/2018  7:00 AM   Calculated Wound Area (cm^2) 1 cm^2 7/23/2018  7:00 AM   Calculated Wound Volume (cm^3) 0 cm^3 7/23/2018  7:00 AM   Tunneling 0 cm 7/23/2018  7:00 AM   Undermining 0 7/23/2018  7:00 AM   Drainage Amount None 7/23/2018  7:00 AM   Treatment Cleansed;Elevated with pillow(s); Offload;Softcare cushion;Turn & reposition 7/23/2018  7:00 AM   Dressing Foam, Silicone (eg  Allevyn, etc) 7/23/2018  7:00 AM   Dressing Changed New dressing applied 7/23/2018  7:00 AM   Wound packed?  No 7/23/2018  7:00 AM   Packing- # removed 0 7/23/2018  7:00 AM   Packing- # inserted 0 7/23/2018  7:00 AM   Patient Tolerance Tolerated well 7/23/2018  7:00 AM   Dressing Status Clean;Dry; Intact 7/23/2018  7:00 AM       Recommendations written as orders  AVS updated    Asya Quiroz RN BSN [facility]

## 2021-08-12 NOTE — SPEECH THERAPY NOTE
Speech Pathology Daily Treatment Note    Patient seen 1:1 plus concurrent as benefit is gained through receiving multisensory cues to improve comprehension, demonstration and retention of techniques and precautions  Compensatory safe swallow education provided which included appropriate 90 degree head/neck/trunk posture, prep to small bites, small and single sips of liquid,complete oral breakdown of solids with formation of a cohesive bolus, oral closure with spoon retrieval, decreased speaking on oral phase, oral clearance prior to reintroduction of bolus, alternate solids with liquids, and rate control  Patient has been educated that a video swallow has been ordered and to take place to determine patient safety with thin liquids  sudden onset

## 2025-02-16 NOTE — PROGRESS NOTES
Pt not able to swallow pills/water/ice chips at this time  For this reason will hold all PO meds  SLIM aware and okay with this for this time  Once SPEECH eval is done will reevaluate      Gopal Ulrich RN  07/20/18  4:13 PM TIFFANIE/Maximilian